# Patient Record
Sex: MALE | Race: WHITE | NOT HISPANIC OR LATINO | URBAN - METROPOLITAN AREA
[De-identification: names, ages, dates, MRNs, and addresses within clinical notes are randomized per-mention and may not be internally consistent; named-entity substitution may affect disease eponyms.]

---

## 2023-01-17 PROBLEM — F25.9 SCHIZOAFFECTIVE DISORDER, CHRONIC CONDITION (HCC): Status: ACTIVE | Noted: 2023-01-17

## 2023-01-17 PROBLEM — F12.11 CANNABIS ABUSE, IN REMISSION: Status: ACTIVE | Noted: 2023-01-17

## 2023-01-17 RX ORDER — RISPERIDONE 1 MG/1
1 TABLET ORAL 2 TIMES DAILY
COMMUNITY
End: 2023-01-19 | Stop reason: SDUPTHER

## 2023-01-17 RX ORDER — RISPERIDONE 2 MG/1
2 TABLET ORAL 2 TIMES DAILY
COMMUNITY
End: 2023-01-19 | Stop reason: SDUPTHER

## 2023-01-19 ENCOUNTER — TELEMEDICINE (OUTPATIENT)
Dept: PSYCHIATRY | Facility: CLINIC | Age: 28
End: 2023-01-19

## 2023-01-19 DIAGNOSIS — F25.9 SCHIZOAFFECTIVE DISORDER, CHRONIC CONDITION (HCC): Primary | ICD-10-CM

## 2023-01-19 DIAGNOSIS — F12.11 CANNABIS ABUSE, IN REMISSION: ICD-10-CM

## 2023-01-19 RX ORDER — RISPERIDONE 2 MG/1
TABLET ORAL
Qty: 90 TABLET | Refills: 1 | Status: SHIPPED | OUTPATIENT
Start: 2023-01-19

## 2023-01-19 RX ORDER — RISPERIDONE 1 MG/1
TABLET ORAL
Qty: 90 TABLET | Refills: 1 | Status: SHIPPED | OUTPATIENT
Start: 2023-01-19

## 2023-01-19 NOTE — PSYCH
Virtual Regular Visit    Verification of patient location: at home    Patient is located in the following state in which I hold an active license [place]      Assessment/Plan:       Diagnoses and all orders for this visit:    Schizoaffective disorder, chronic condition ([facility] [address] )  -     risperiDONE (RisperDAL) 1 mg tablet; Take 1 PO QD  -     risperiDONE (RisperDAL) 2 mg tablet; Take 1 PO Q HS    Cannabis abuse, in remission    Other orders  -     Discontinue: risperiDONE (RisperDAL) 1 mg tablet; Take 1 mg by mouth 2 (two) times a day Take 1 PO QD  -     Discontinue: risperiDONE (RisperDAL) 2 mg tablet; Take 2 mg by mouth 2 (two) times a day Take 1 PO Q HS          Goals addressed in session:   Good Health  Counseling provided:      Treatment Recommendations- Risks Benefits       Immediate Medical/Psychiatric/Psychotherapy Treatments and Any Precautions:     Risks, Benefits And Possible Side Effects Of Medications:  Risks, benefits, and possible side effects of medications explained to patient and patient verbalizes understanding    Controlled Medication Discussion: No records found for controlled prescriptions according to [address]      Reason for visit is No chief complaint on file  Medication Management    Encounter provider CHA Dahl    [place] [address] [place] [address]  [phone]      Recent Visits  No visits were found meeting these conditions  Showing recent visits within past 7 days and meeting all other requirements  Today's Visits  Date Type Provider Dept   01/19/23 Telemedicine [facility] today's visits and meeting all other requirements  Future Appointments  No visits were found meeting these conditions    Showing future appointments within next 150 days and meeting all other requirements       The patient was identified by name and date of birth  Cammie Parikh was informed that this is a telemedicine visit and that the visit is being conducted throughthe Yamise[employer] platform  He agrees to proceed     My office door was closed  No one else was in the room  He acknowledged consent and understanding of privacy and security of the video platform  The patient has agreed to participate and understands they can discontinue the visit at any time  Patient is aware this is a billable service  Subjective    Cammie Parikh is a 33 y o  male    here today for a med check  This was via [address] [facility]    normal appetite      HPI  Mood good  Denies anxiety  Denies Psychotic Symptoms  Denies Cannabis use  No problems with medication  Appetite Sleep good  Health OK  Denies SI/HI    No past medical history on file  No past surgical history on file  Current Outpatient Medications   Medication Sig Dispense Refill   • risperiDONE (RisperDAL) 1 mg tablet Take 1 PO QD 90 tablet 1   • risperiDONE (RisperDAL) 2 mg tablet Take 1 PO Q HS 90 tablet 1     No current facility-administered medications for this visit  Not on File    Social History     Substance and Sexual Activity   Drug Use Not on file       No family history on file          Objective    Mental status:  Appearance calm and cooperative  and adequate hygiene and grooming   Mood mood appropriate   Affect affect appropriate    Speech a normal rate and fluent   Thought Processes normal thought processes   Hallucinations no hallucinations present    Thought Content no delusions   Abnormal Thoughts no suicidal thoughts  and no homicidal thoughts    Orientation  oriented to person and place and time   Remote Memory short term memory intact and long term memory intact   Attention Span concentration intact   Intellect Appears to be of Average Intelligence   Insight Limited insight   Judgement judgment was limited   Muscle Strength Muscle strength and tone were normal and Normal gait    Language no difficulty naming common objects   Fund of Knowledge displays adequate knowledge of current events   Pain none   Pain Scale 0       Video Exam    There were no vitals filed for this visit      I spent 15 minutes directly with the patient during this visit    Patient Instructions   Continue Current Tx  This session went for an additional 5 minutes after he left to document, meds and Tx plan  Report Problems  Return 3 months       Visit Time    Visit Start Time: 0900  Visit Stop Time: 0914  Total Visit Duration: 14 min

## 2023-01-19 NOTE — PATIENT INSTRUCTIONS
Continue Current Tx  This session went for an additional 5 minutes after he left to document, meds and Tx plan  Report Problems  Return 3 months

## 2023-01-19 NOTE — BH TREATMENT PLAN
TREATMENT PLAN (Medication Management Only)        [facility]    Name and Date of Birth:  Nini Torres 33 y o  1995  Date of Treatment Plan: January 19, 2023  Diagnosis/Diagnoses:    1  Schizoaffective disorder, chronic condition ([address] )    2  Cannabis abuse, in remission      Strengths/Personal Resources for Self-Care: supportive family, supportive friends  Area/Areas of need (in own words): hallucinations, paranoid thoughts  1  Long Term Goal: maintain psychotic symptoms  Target Date:6 months - 7/19/2023  Person/Persons responsible for completion of goal: family Arash  2  Short Term Objective (s) - How will we reach this goal?:   A  Provider new recommended medication/dosage changes and/or continue medication(s): continue current medications as prescribed Risperdal   B  N/A   C  N/A  Target Date:6 months - 7/19/2023  Person/Persons Responsible for Completion of Goal: family Arash  Progress Towards Goals: continuing treatment  Treatment Modality: medication management every 3 months  Review due 180 days from date of this plan: 6 months - 7/19/2023  Expected length of service: ongoing treatment  My Physician/PA/NP and I have developed this plan together and I agree to work on the goals and objectives  I understand the treatment goals that were developed for my treatment

## 2023-02-14 ENCOUNTER — TELEPHONE (OUTPATIENT)
Dept: PSYCHIATRY | Facility: CLINIC | Age: 28
End: 2023-02-14

## 2023-02-14 NOTE — TELEPHONE ENCOUNTER
Patient's mother called to inform PF that this patient has a new insurance  Medicare [employer]  She will drop off a copy of the card

## 2023-02-22 ENCOUNTER — TELEMEDICINE (OUTPATIENT)
Dept: PSYCHIATRY | Facility: CLINIC | Age: 28
End: 2023-02-22

## 2023-02-22 DIAGNOSIS — F25.9 SCHIZOAFFECTIVE DISORDER, CHRONIC CONDITION (HCC): Primary | ICD-10-CM

## 2023-02-22 DIAGNOSIS — F12.11 CANNABIS ABUSE, IN REMISSION: ICD-10-CM

## 2023-02-22 RX ORDER — ARIPIPRAZOLE 5 MG/1
TABLET ORAL
Qty: 90 TABLET | Refills: 1 | Status: SHIPPED | OUTPATIENT
Start: 2023-02-22 | End: 2023-03-01

## 2023-02-22 NOTE — PSYCH
Visit Time      Verification of patient location: at home    Patient is located in the following state in which I hold an active license [place]      Assessment/Plan:       Diagnoses and all orders for this visit:    Schizoaffective disorder, chronic condition ([facility])  -     ARIPiprazole (ABILIFY) 5 mg tablet; Take 1 PO Q HS    Cannabis abuse, in remission          Goals addressed in session:   Good Health  Counseling provided:      Treatment Recommendations- Risks Benefits       Immediate Medical/Psychiatric/Psychotherapy Treatments and Any Precautions:     Risks, Benefits And Possible Side Effects Of Medications:  Risks, benefits, and possible side effects of medications explained to patient and patient verbalizes understanding    Controlled Medication Discussion: No records found for controlled prescriptions according to [address]      Reason for visit is No chief complaint on file  Medication Management     Encounter provider CHA Perez    Provider located at [address] [facility]  [address]  [phone]      Recent Visits  No visits were found meeting these conditions  Showing recent visits within past 7 days and meeting all other requirements  Today's Visits  Date Type Provider Dept   [facility] and meeting all other requirements  Future Appointments  No visits were found meeting these conditions  Showing future appointments within next 150 days and meeting all other requirements       The patient was identified by name and date of birth  Mark Ross was informed that this is a telemedicine visit and that the visit is being conducted throughthe [employer] mini[employer] platform  He agrees to proceed     My office door was closed  No one else was in the room    He acknowledged consent and understanding of privacy and security of the video platform  The patient has agreed to participate and understands they can discontinue the visit at any time  Patient is aware this is a billable service  Subjective    Akua Frances is a 33 y o  male    Here today for a med check  This was via [address] [facility]    normal appetite      HPI Mood good  Denies anxiety  Denies Psychotic Symptoms  Feels he needs a higher dose and or a change in meds  No problems with medication  Appetite Sleep good  Health OK  Denies SI/HI    No past medical history on file  No past surgical history on file  Current Outpatient Medications   Medication Sig Dispense Refill   • ARIPiprazole (ABILIFY) 5 mg tablet Take 1 PO Q HS 90 tablet 1     No current facility-administered medications for this visit  Not on File    Social History     Substance and Sexual Activity   Drug Use Not on file       No family history on file  Objective    Mental status:  Appearance calm and cooperative , adequate hygiene and grooming and good eye contact    Mood mood appropriate   Affect affect appropriate    Speech a normal rate and fluent   Thought Processes normal thought processes   Hallucinations no hallucinations present    Thought Content no delusions   Abnormal Thoughts no suicidal thoughts  and no homicidal thoughts    Orientation  oriented to person and place and time   Remote Memory short term memory intact and long term memory intact   Attention Span concentration intact   Intellect Appears to be of Average Intelligence   Insight Insight intact   Judgement judgment was intact   Muscle Strength Muscle strength and tone were normal and Normal gait    Language no difficulty naming common objects   Fund of Knowledge displays adequate knowledge of current events   Pain none   Pain Scale 0       Video Exam    There were no vitals filed for this visit      I spent 15 minutes directly with the patient during this visit    Patient Instructions   Continue Tx  D/C Risperdal- his request  Start Abilify  Report Problems  Return 3 months     Visit Time    Visit Start Time: 5863  Visit Stop Time: 2528  Total Visit Duration: 15 min

## 2023-02-27 ENCOUNTER — TELEPHONE (OUTPATIENT)
Dept: PSYCHIATRY | Facility: CLINIC | Age: 28
End: 2023-02-27

## 2023-03-01 DIAGNOSIS — F25.9 SCHIZOAFFECTIVE DISORDER, CHRONIC CONDITION (HCC): Primary | ICD-10-CM

## 2023-03-01 RX ORDER — OLANZAPINE 10 MG/1
TABLET ORAL
Qty: 30 TABLET | Refills: 3 | Status: SHIPPED | OUTPATIENT
Start: 2023-03-01 | End: 2023-04-12

## 2023-04-27 ENCOUNTER — TELEMEDICINE (OUTPATIENT)
Dept: PSYCHIATRY | Facility: CLINIC | Age: 28
End: 2023-04-27

## 2023-04-27 DIAGNOSIS — F25.9 SCHIZOAFFECTIVE DISORDER, CHRONIC CONDITION (HCC): Primary | ICD-10-CM

## 2023-04-27 DIAGNOSIS — F12.11 CANNABIS ABUSE, IN REMISSION: ICD-10-CM

## 2023-04-27 RX ORDER — ARIPIPRAZOLE 5 MG/1
TABLET ORAL
Qty: 30 TABLET | Refills: 3 | Status: SHIPPED | OUTPATIENT
Start: 2023-04-27

## 2023-05-03 ENCOUNTER — DOCUMENTATION (OUTPATIENT)
Dept: PSYCHIATRY | Facility: CLINIC | Age: 28
End: 2023-05-03

## 2023-05-03 NOTE — PSYCH
[facility]    Patient Name Julia Deluca     Date of Birth: 33 y o  1995      MRN: [id number]    Admission Date: several years ago    Date of Transfer: May 3, 2023    Admission Diagnosis:     Schizoaffective D/O Chronic Condition    Current Diagnosis:     No diagnosis found  Reason for Admission: Silvia Willingham presented for treatment due to paranoid ideation and auditory hallucinations  Primary complaints included PSYCHOTIC SYMPTOMS: auditory hallucinations, paranoid ideation  Progress in Treatment: Silvia Willingham was seen for Medication Management  During the course of treatment he      Episodes of Higher Level of Care: No    Transfer request Initiated by: Psychiatrist: Kristina Felix Therapist: None    Reason for Transfer Request: patient requested transfer    Does this individual need a clinician with specialized training/expertise?: No    Is this client working with any other [facility] Providers/Therapists?  Psychiatrist: None Therapist: None    Other pertinent issues: None    Are there any specific individuals who would be a best fit or who have already agreed to accept this transfer request?      Psychiatrist: None   Therapist: None  Rationale: Not Applicable    Attempts to maintain the current therapeutic relationship: Not Applicable    Transfer request routed to Clinical Coordinator for input and/or approval      Comments from other involved providers and/or clinical coordinator: None    Kristina Felix, NIKOLASNP05/03/23

## 2023-06-16 ENCOUNTER — DOCUMENTATION (OUTPATIENT)
Dept: PSYCHIATRY | Facility: CLINIC | Age: 28
End: 2023-06-16

## 2023-06-16 NOTE — PSYCH
[facility]    Patient Name Giovanni Albert     Date of Birth: 33 y o  1995      MRN: [id number]    Admission Date: several years ago    Date of Transfer: June 16, 2023    Admission Diagnosis:     Schizoaffectic D/O Chronic Condition    Current Diagnosis:     No diagnosis found  Reason for Admission: Ravinder Torres presented for treatment due to psychotic symptoms  Primary complaints included PSYCHOTIC SYMPTOMS: unremarkable  Progress in Treatment: Ravinder Torres was seen for Medication Management  During the course of treatment he said mood good  Denied Psychotic Symptoms  Wanted to do something other tan Risperdal   Risperdal D/C and Abilify started  Episodes of Higher Level of Care: No    Transfer request Initiated by: Psychiatrist: Nurse Practitioner Jewels Blanco Therapist: None    Reason for Transfer Request: clinician leaving practice    Does this individual need a clinician with specialized training/expertise?: No    Is this client working with any other [facility] Providers/Therapists?  Psychiatrist: None Therapist: None    Other pertinent issues: None    Are there any specific individuals who would be a “best fit” or who have already agreed to accept this transfer request?      Psychiatrist: None   Therapist: None  Rationale: Not Applicable    Attempts to maintain the current therapeutic relationship: Not Applicable    Transfer request routed to Clinical Coordinator for input and/or approval      Comments from other involved providers and/or clinical coordinator: None    CHA Newell06/16/23

## 2023-06-22 ENCOUNTER — TELEPHONE (OUTPATIENT)
Dept: PSYCHIATRY | Facility: CLINIC | Age: 28
End: 2023-06-22

## 2023-06-22 NOTE — TELEPHONE ENCOUNTER
Contacted client about cancelling 7/12/2023 appt  with CHA Aguirre due to him retiring  We are in the process of hiring new providers within our practice  Once we have their schedules, we will contact you to reschedule your appt  Please call [phone] to request refills as needed

## 2023-06-29 ENCOUNTER — TELEPHONE (OUTPATIENT)
Dept: PSYCHIATRY | Facility: CLINIC | Age: 28
End: 2023-06-29

## 2023-07-05 ENCOUNTER — TELEPHONE (OUTPATIENT)
Dept: PSYCHIATRY | Facility: CLINIC | Age: 28
End: 2023-07-05

## 2023-07-05 NOTE — TELEPHONE ENCOUNTER
Mom called, said that Stacy Baxter is "very manic and needs something to calm himselft down"    I spoke with mom, explained that his new dr can't prescribe new medications since he's not been seen yet but that she can call to see if his appt could be moved up, she then asked about speaking with a nurse which I told her she could do but that I thought she'd get the same answer about him needing to be seen first    Mom said that she's not sure she could get him to a sooner appt but that she'd think about it and maybe call to see if there was one or maybe call the nurse line for some more advice

## 2023-07-05 NOTE — TELEPHONE ENCOUNTER
Clts mother is going to forward some information  Regarding the patient for provider to review before she sees him. Sudhakar is not doing well on the medications he is currently taking.

## 2023-07-10 ENCOUNTER — TELEPHONE (OUTPATIENT)
Dept: PSYCHIATRY | Facility: CLINIC | Age: 28
End: 2023-07-10

## 2023-07-10 NOTE — TELEPHONE ENCOUNTER
FYI-for upcoming appointment on 7/12/23    Clients mother called to leave a message for new provider who will be seeing client on 7/12/2023. She wanted provider to know that client only took Abilify for one week after appointment with Chel Portillo in April and stopped taking because "it kept him up all night." Client is back to taking Risperdal 2 mg at night per the mother. Mother also reports "he talks to himself all day long, he has done this before but it's worse now. He has mood swings, and manic times, and periods of grogginess too."  Informed information would be passed along for upcoming appointment.

## 2023-07-12 ENCOUNTER — OFFICE VISIT (OUTPATIENT)
Dept: PSYCHIATRY | Facility: CLINIC | Age: 28
End: 2023-07-12
Payer: COMMERCIAL

## 2023-07-12 DIAGNOSIS — F20.9 SCHIZOPHRENIA, UNSPECIFIED TYPE (HCC): Primary | ICD-10-CM

## 2023-07-12 PROCEDURE — 90792 PSYCH DIAG EVAL W/MED SRVCS: CPT | Performed by: STUDENT IN AN ORGANIZED HEALTH CARE EDUCATION/TRAINING PROGRAM

## 2023-07-12 RX ORDER — RISPERIDONE 3 MG/1
3 TABLET ORAL
Qty: 30 TABLET | Refills: 1 | Status: SHIPPED | OUTPATIENT
Start: 2023-07-12

## 2023-07-12 NOTE — BH TREATMENT PLAN
TREATMENT PLAN - Medication Management        [address]    Name and Date of Birth:  Lul Melo 33 y.o. 1995  Date of Treatment Plan: July 12, 2023  Diagnosis/Diagnoses:    1. Schizophrenia, unspecified type [facility])        Strengths/Personal Resources for Self-Care: taking medications as prescribed, ability to understand psychiatric illness, good physical health     Area/Areas of need: psychotic symptoms   "Less stress, more walks, more playing games"    Long Term Goal: decrease psychotic symptoms  Target Date: 6 months - January 12, 2024  Person/Persons responsible for completion of goal: Elizabeth Cartwright and Akhil Pascual MD     Short Term Objective (s) - How will we reach this goal?:   1. Take medications as prescribed  2. Attend psychiatry appointments regularly  3. Avoid alcohol   4. Avoid drugs   5. Take walks regularly  Target Date: 6 months - January 12, 2024  Person/Persons Responsible for Completion of Goal: Elizabeth Cartwright     Progress Towards Goals: Continuing treatment    Treatment Modality: medication management every 1-3 months as needed and follow up with PCP  Review due 180 days from date of this plan: January 8, 2024   Expected length of service: Ongoing treatment    My physician and I have developed this plan together, and I agree to work on the goals and objectives. I understand the treatment goals that were developed for my treatment. The treatment plan was created between Akhil Pascual MD and Lul Melo on 07/12/23 at 3:58 PM but not signed at the time of the visit due to [facility] distancing. The plan was reviewed, and verbal consent was given.

## 2023-07-12 NOTE — PSYCH
[facility] Outpatient clinic   [address]   [phone]    Initial Psychiatric Evaluation    Name and Date of Birth:  Nuno Anderson 33 y.o. 1995 MRN: [id number]    Date of Visit: July 12, 2023    Reason for visit: Transfer of care, Full psychiatric assessment for medication management     Chief Complaint: Transfer of care, medication management, "The voices are back on the lower dose of medication"    HPI     Nuno Anderson is a 33 y.o.  Male, with high school education, single, domiciled with mother, with no past medical history of and past psychiatric history of schizoaffective disorder vs. schizophrenia who presents to the [facility] outpatient clinic as a transfer of care from [place], [place]. At his most recent visit with [facility] in April, he was denying psychotic symptoms and wished to switch from risperdal to something new, was switched to abilify 5mg at bedtime. Several days prior to today's visit, Tex's mother called the office to notify that he only took the abilify for one week after his last appointment because it "kept him up all night" and resumed taking risperdal 2mg HS at some point after that. He also reportedly has been talking to himself all day, with mood swings, grogginess and she feels he has been "manic."    In review of his past psychiatric history as discussed with Clemente Ventura today, he was first hospitalized at age 25 "After stealing my sisters klonopin." He states his psychotic symptoms started at age 26- 26 when he began hearing voices. He denies a prodromal period at that times. Denies any bloodwork or imaging workup when the voices started.  He feels this was exacerbated by smoking "a lot of marijuana" for 5 years, nearly every day, however he stopped smoking 2019 July after he "freaked out" and thought "there were people in his basement and living in his house and torturing him." He denies any other recreational drug use or alcohol use out of fear of worsening his psychosis symptoms. He feels he is sensitive to medications which "hurt my head" and will "only take medications that are just for schizophrenia and not bipolar disorder."    He has had a total of approximately 10 lifetime hospitalizations, the most recent being 2 years ago after a suicide attempt by hanging. The most common reason he is hospitalized is for psychosis. He has trialed few medications over the years but is unsure of why many of them were discontinued (see past psychiatric history below for further details). He feels he was stable on 3mg of risperdal previously. He tried invega SCHAEFER in 2019 but feels he is still having adverse effects from this experience. He feels his history of bullying has contributed to his psychotic symptoms, and describes as specific instance 4 years ago where a neighbor/friend told him he is a "loser" and that "nobody likes him" and he finds himself thinking about that. Brionna Davis presents today pacing and talking to himself throughout the evaluation. He states he is hearing voices "of people he knows" which he reports are not commanding in nature, which started approximately 2 months ago. He states he only hears one voice at a time and the content can be negative, positive, or funny at times. He finds caffeine and walking make the voices better, music makes it worse. He denies visual hallucinations, denies paranoia, denies delusions of reference or persecutory delusions. He reports limited motivation at times. He states he has been taking risperdal 2mg at bedtime. He tried abilify after last visit which caused him inability to sleep, so he self-restarted the risperdal 2mg which has not been adequately managing his symptoms. He had previously been on 3mg of risperdal total, however this was making him nauseous, causing it to be decreased to 2mg.  He states he does not take this with food. We discussed this nausea may be improved by taking the medication with food and he is in agreement with trying this. He states "when the medication is bumped up I don't talk to myself." He reports he lives with his mother and she feels he has been "manicky" which he describes means "stressed out" and denies any typical symptoms of mukesh - reports he is sleeping well, and denies impulsivity, irritability, grandiosity, talkativeness. He states he "feels sad sometimes" and at times "thinks life is not worth living" but "I would never do anything" to hurt himself. He feels most frustrated because his symptoms are interfering with his ability to play video games, which is his hobby in addition to taking walks. He denies any current stressors. Current Rating Scores:     Current PHQ-9   PHQ-2/9 Depression Screening    Little interest or pleasure in doing things: 1 - several days  Feeling down, depressed, or hopeless: 0 - not at all  Trouble falling or staying asleep, or sleeping too much: 0 - not at all  Feeling tired or having little energy: 1 - several days  Poor appetite or overeatin - more than half the days  Feeling bad about yourself - or that you are a failure or have let yourself or your family down: 0 - not at all  Trouble concentrating on things, such as reading the newspaper or watching television: 0 - not at all  Moving or speaking so slowly that other people could have noticed. Or the opposite - being so fidgety or restless that you have been moving around a lot more than usual: 0 - not at all  Thoughts that you would be better off dead, or of hurting yourself in some way: 0 - not at all  PHQ-9 Score: 4   PHQ-9 Interpretation: No or Minimal depression        Current JESSE-7 is   JESSE-7 Flowsheet Screening    Flowsheet Row Most Recent Value   Over the last 2 weeks, how often have you been bothered by any of the following problems?     Feeling nervous, anxious, or on edge 0   Not being able to stop or control worrying 0   Worrying too much about different things 0   Trouble relaxing 0   Being so restless that it is hard to sit still 2   Becoming easily annoyed or irritable 0   Feeling afraid as if something awful might happen 0   JESSE-7 Total Score 2      .     Psychiatric Review Of Systems:    Sleep changes: no 8PM, woke up for a bit around 5:30, slept until 10   Appetite changes: decreased  Weight changes: no  Energy/anergy: no  Interest/pleasure/anhedonia: no  Attention/concentration: no  Psychomotor agitation/retardation: yes  Somatic symptoms: no  Anxiety/panic: no  Mylene: no  Guilty/hopeless: no  Self injurious behavior/risky behavior: no  Suicidal ideation: no  Homicidal ideation: no  Auditory hallucinations: yes, auditory hallucinations of voices of people he knows, preoccupied, appears responding to internal stimuli  Visual hallucinations: denies  Other hallucinations: denies gustatory or tactile hallucinations  Delusional thinking: denies paranoia, ideas of reference, persecutory delusions    Review Of Systems:    Constitutional negative   ENT negative   Cardiovascular negative   Respiratory negative   Gastrointestinal negative   Genitourinary negative   Musculoskeletal negative   Integumentary negative   Neurological negative   Endocrine negative   Other Symptoms none, all other systems are negative       Past Psychiatric History:     Past psychiatric diagnoses:   • Schizophrenia, schizoaffective d/o, anxiety, depression   Inpatient psychiatric admissions:   • 10 times total (4 were involuntary)  • First - age 25 after stealing sisters klonopin  • Most common reason: first few were "no reason"  • 2 suicide attempts/SI: SI and wanting to jump off a roof (2021) a few months later after sleeping pills, tried to hang himself 2 years ago  • Psychosis 3 times (most recent for that reason was 3 years)  • Most recent was 2 years ago  • Has stayed out of the hospital due to "taking his medications, not having suicidal ideation"  • Denies history of ECT, denies having had ICM in past.   Prior outpatient psychiatric treatment:   • Saw Rizwana Arauz for 3 years  • Saw psychiatrist at [facility] for a year before that  Past/current psychotherapy:   • No  History of suicidal attempts/gestures:   • 2: took sleeping pills (August 2021), tried to hang self two years because gabapentin   History of non-suicidal self-injurious behavior:   • None  History of violence/aggressive behaviors:   • None  Psychotropic medication trials:   • Invega, (discomfort) risperdal, zyprexa, thorazine (all unknown reasons for discontinuation)  • Abilify (bad reaction)  • Invega SCHAEFER x1 in 2019 (hurt his head)  • Ativan (caused psychosis)  Substance abuse inpatient/outpatient rehabilitation:   • Rehab in 2017 - marijuana use  Eating disorder history:   • no    Substance Abuse History:      States he was smoking a lot of marijuana for 5 years, nearly every day, stopped smoking 2019 July after he "freaked out" and thought "there were people in his basement and living in his house and torturing him. "    Denies any other recreational drug use and otherwise denies history of alcohol, illict substance, or tobacco abuse., Patient denies previous legal actions or arrests related to substance intoxication including prior DWIs/DUIs., Patient does not exhibit objective evidence of substance withdrawal during today's examination nor do they appear under the influence of any psychoactive substance. Family Psychiatric History:     History reviewed. No pertinent family history.     Psychiatric Family History: Adopted, unknown if any history of psychiatric illness, substance use, or suicide attempts    Social History:    Developmental: Was in special education, denies IEP  Has 2 sister (48and 29years old), was adopted  Education: high school diploma/GED  Marital history: single  Children: none  Living arrangement, social support: Family is supportive, mom is supportive. Lives with mother, on a waiting list for housing, on disability for schizoaffective disorder. Occupational History: Permanent disability. 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 , Patient denies history of arrests or violence with a deadly weapon.  history: None    Traumatic History:     Abuse: Denies  Other Traumatic Events: Bullying, and would not disclose further    Past Medical History:    History reviewed. No pertinent past medical history. History reviewed. No pertinent surgical history. No Known Allergies    History Review: The following portions of the patient's history were reviewed and updated as appropriate: allergies, current medications, past family history, past medical history, past social history, past surgical history and problem list.    OBJECTIVE:    Vital signs in last 24 hours: There were no vitals filed for this visit.     Mental Status Evaluation:    Appearance age appropriate, casually dressed, appropriately groomed, intermittent eye contact, pacing the room   Behavior agitated, pacing, anxious, laughing to self   Speech coherent, increased latency of response, also muttering to himself unable to decipher what is being said   Mood euthymic   Affect blunted when speaking but reactive to internal stimuli   Thought Processes goal directed, decreased rate of thoughts   Associations concrete associations   Thought Content denies delusions or paranoia   Perceptual Disturbances: Reports auditory hallucinations as detailed above, denies command in nature, denies VH, and Appears to be responding to internal stimuli   Abnormal Thoughts  Risk Potential Denies suicidal or homicidal ideation, plan, or intent   Orientation oriented to person, place, time/date and situation Memory recent and remote memory grossly intact   Consciousness alert and awake   Attention Span Concentration Span decreased attention span  decreased concentration   Intellect appears to be of average intelligence   Insight intact   Judgement intact   Muscle Strength and  Gait normal muscle strength and normal muscle tone, normal gait and normal balance   Motor Activity no abnormal movements   Language no difficulty naming common objects, no difficulty repeating a phrase, no difficulty writing a sentence   Fund of Knowledge adequate knowledge of current events  adequate fund of knowledge regarding past history  adequate fund of knowledge regarding vocabulary        Laboratory Results: I have personally reviewed all pertinent laboratory/tests results    Recent Labs (last 6 months):   No visits with results within 6 Month(s) from this visit. Latest known visit with results is:   No results found for any previous visit. Suicide/Homicide Risk Assessment:    Risk of Harm to Self:  • The following ratings are based on assessment at the time of the interview  • Demographic risk factors include: , never , male  • Historical Risk Factors include: chronic psychotic symptoms, history of psychosis, history of suicide attempts, history of substance use  • Recent Specific Risk Factors include: mental illness diagnosis, presence of hallucinations, unemployed  • Protective Factors: no current suicidal ideation, access to mental health treatment, compliant with medications, compliant with mental health treatment, having a desire to be alive, restricted access to lethal means, stable living environment, strong relationships, supportive family  • Weapons: none.  The following steps have been taken to ensure weapons are properly secured: not applicable  • Based on today's assessment, Rameshcelio Armando presents the following risk of harm to self: low    Risk of Harm to Others:  • The following ratings are based on assessment at the time of the interview  • Demographic Risk Factors include: male, unemployed, under age 37. • Historical Risk Factors include: none. • Recent Specific Risk Factors include: concomitant thought disorder. • Protective Factors: no current homicidal ideation, able to manage anger well, access to mental health treatment, compliant with medications, compliant with mental health treatment, restricted access to lethal means, safe and stable living environment, support system, supportive family  • Weapons: none. The following steps have been taken to ensure weapons are properly secured: not applicable  • Based on today's assessment, Ariel Paige presents the following risk of harm to others: low    The following interventions are recommended: no intervention changes needed. Although patient's acute lethality risk is LOW, long-term/chronic lethality risk is mildly elevated given detailed above., However, at the current moment, patient is future-oriented, forward-thinking, and demonstrates ability to act in a self-preserving manner as evidenced by volitionally seeking psychiatric evaluation and treatment today. Gilmar Morse contracts for safety and is not an imminent risk of harm to self or others. Outpatient level of care is deemed appropriate at this current time. Patient understands that if they can no longer contract for safety, they need to call the office or report to their nearest Emergency Room for immediate evaluation. At this juncture, inpatient hospitalization is not currently warranted. To mitigate future risk, patient should adhere to treatment recommendations, avoid alcohol/illicit substance use, utilize community-based resources and familiar support, and prioritize mental health treatment.        Assessment/Plan:   Gilmar Morse is a 33 y.o. male, with high school education, single, adopted, domiciled with mother, with multiple admissions and 2 suicide attempts in the past, and with past psychiatric history of schizoaffective disorder vs. schizophrenia who presents to the [facility] outpatient clinic as a transfer of care from [address]. Beatris Emery reports a longstanding history of psychosis without clear mood symptoms, though at times feels sad and at times in the past suicidal, feels he has never truly been in a depressive episode, and denies any past symptoms consistent with mukehs or hypomania. At this time he  appears to meet criteria for schizophrenia. He is currently experiencing symptoms of psychosis without concurrent mood symptoms. Discussed if he feels he would require/want inpatient treatment at this time and he declines. Patient appears to have good grooming and hygiene, reports he has been sleeping, eating, and caring for himself appropriately and attending to ADLS, demonstrating he is not at imminent risk of death due to self-neglect within 30 days. He appears to have fair insight into his mental illness and good judgement regarding need for medication and treatment. He reports he historically has asked for help when needed. He denies any thoughts to harm himself or others, and reports no history of harming others even during a psychotic episode. He does not appear to pose elevated risk of harming self or others. He does not wish to try a new medication and wishes to have his risperdal increased to better manage his psychotic symptoms. He is agreeable to follow up in two weeks to ensure adherence, check in for progress and symptoms,and check in regarding side effects. He is aware of [facility] crisis information and crisis hotline, and agrees to call the office with any issues or concerns.     DSM-5 Diagnoses:     1. Schizophrenia, unspecified type ([address])        Treatment Recommendations/Precautions:  • Increase risperdal to 3mg HS for psychotic symptoms  o Can further titrate if needed for lack of response at next visit in 2 weeks  o PARQ completed including sedation, agitation, akathisia, TD, dystonic reactions, NMS, EPS, GI distress, dizziness, risk of metabolic syndrome, orthostatic hypotension and cardiovascular risks such as QT prolongation, increased prolactin  • Declines other PRN medications in the meantime  • Medication management follow-up in 2 weeks  • Aware if any issues to call office with questions or concerns. Aware of crisis line. Risks, benefits, and possible side effects of medications explained to [facility] and he verbalizes understanding and agreement for treatment. Controlled Medication Discussion:     Not applicable    Treatment Plan:    Completed and signed during the session: Yes - Treatment Plan done but not signed at time of office visit due to:  Plan reviewed in person and verbal consent given due to [facility] social distancing      Note Share Disclaimer:      This note was not shared with the patient due to reasonable likelihood of causing patient harm      Neftali Locke MD 07/12/23

## 2023-07-12 NOTE — PATIENT INSTRUCTIONS
Please call the office nursing staff for medication issues including refills, problems getting medications, bothersome side effects, etc at . Please return for a follow up appointment as discussed and arrive approximately 15 minutes prior to your appointment time. If you are running late or are unable to attend your appointment, please call our office at [phone]. If you have thoughts of harming yourself or are otherwise in psychological crisis, do not hesitate to contact your [address] [facility], or 911 or go to the nearest emergency room.   [facility] Crisis: [facility] Crisis: [address] Crisis: [phone]  [facility] Crisis: [phone]  [facility] Crisis: [address] Crisis: [address] [facility] Crisis: [phone]  National Suicide Prevention Hotline: 3-561.233.8643 or call 65

## 2023-07-25 NOTE — PSYCH
MEDICATION MANAGEMENT NOTE      [address]:  [address]   [place]  [address] [place] [address]   [phone]    Name and Date of Birth:  Wilian Dunham 33 y.o. 1995 MRN: [id number]    Date of Visit: July 25, 2023    Reason for Visit: Follow-up visit regarding medication management     _____________________________    Assessment/Plan   Wilian Dunham is a 33 y.o. male, Single with prior psychiatric diagnoses of schizoaffective disorder; with suicide risk factors including personal history of suicide attempt as recently as 2021, chronic mental illness, psychosis, gender (male) and never . Stacy Baxter was personally seen and evaluated today at the [address] [place] [address] outpatient clinic for follow-up regarding medication management. Today on evaluation, Stacy Baxter reports that he has not been taking his medication as prescribed and did not increase his dosage as planned at his last appointment 2 weeks ago. As a result, he continues to feel irritable, anxious, pacing, hearing to voices and responding to internal stimuli. We discussed alternative medication options including clozaril, adding zyprexa, or adding loxapine with goal to transition to loxapine. Patient considering clozaril down the line but would like to try loxapine. Explains risks and side effects, and medication dosage instructions and he expressed understanding and agreement. Will plan to see him in three weeks to assess progress. Will also obtain blood work as patient has not had bloodwork done recently and no labs are available in epic.     DSM-5 Diagnoses:     1. Schizoaffective disorder, bipolar type - not at goal    Treatment Recommendations/Precautions:  • Continue psychopharmacological treatment as follows:  o Continue risperdal 2mg HS as patient has been taking this dosage, for psychosis  o Initiate loxapine 10mg BID for psychosis, with plans to titrate as needed   o Joon Wibaux was completed for second generation antipsychotic medicationd including sedation, GI distress, dizziness, risk of metabolic syndrome, EPS (akathisia, TD, etc), rare NMS, orthostatic hypotension, cardiovascular risks such as QT prolongation, increased prolactin, and others. • Obtain CBC, CMP, TSH, Lipid panel, A1c, vitamin D  • Follow up with PCP for medical issues and ongoing care  • Follow up in 3 weeks for medication management  • Aware of 24 hour and weekend coverage for urgent situations accessed by calling [facility] main practice number    Psychotherapy Provided: Individual psychotherapy provided: No     Treatment Plan:     Completed and signed during the session: Not applicable - Treatment Plan not due at this session    Medications Risks/Benefits:      Risks, Benefits And Possible Side Effects Of Medications:    Risks, benefits, and possible side effects of medications explained to [facility] and he verbalizes understanding and agreement for treatment. Controlled Medication Discussion:     Not applicable  _____________________________________________    History of Present Illness     Chief Complaint: "The psychosis is bad"    SUBJECTIVE:    Dariel Bernard is a 33 y.o., , male, possessing a past psychiatric history significant for schizoaffective disorder, who was personally seen and evaluated at the [address] [place] [address] outpatient clinic  for follow-up regarding medication management. [facility] states that since their previous outpatient psychiatric appointment with this writer, he has not been taking the increased dose of the risperdal. He tried to take the higher dose and it makes him feel more stressed, and feel more irritable. He feels that his "psychosis" and "stress" have been difficult, is having flashbacks of his neighbor talking to him while we are in the room together.      He states he is willing to switch to a different medication but would prefer to have a second agent added onto his current medication. He has been going to the pool and going on walks, mood has been "good." His baseline when medications are working: less stress, less pacing, no talking to self, and could play games     Presently, patient denies suicidal/homicidal ideation in addition to thoughts of self-injury; contracts for safety. At conclusion of evaluation, patient is amenable to the recommendations of this writer including: continue psychotropic medications as prescribed. Also, patient is amenable to calling/contacting the outpatient office including this writer if any acute adverse effects of their medication regimen arise in addition to any comments or concerns pertaining to their psychiatric management. Patient is amenable to calling/contacting crisis and/or attending to the nearest emergency department if their clinical condition deteriorates to assure their safety and stability, stating that they are able to appropriately confide in their mother regarding their psychiatric state. Current Rating Scores:     Unable to complete due to patient factors    Psychiatric Review Of Systems:    Unchanged information from this writer's previous assessment is copied and italicized; information that has changed is bolded.     Appetite: no  Adverse eating: no  Weight changes: no  Insomnia/sleeplessness: no  Fatigue/anergy: no  Anhedonia/lack of interest: no  Attention/concentration: no  Psychomotor agitation/retardation: agitation  Somatic symptoms: no  Anxiety/panic attack: worrying daily, excessive worrying   Mylene/hypomania: no  Hopelessness/helplessness/worthlessness: no  Self-injurious behavior/high-risk behavior: no  Suicidal ideation: no  Homicidal ideation: no  Auditory hallucinations: yes, auditory hallucinations  Visual hallucinations: no  Other perceptual disturbances: no  Delusional thinking: no  Obsessive/compulsive symptoms: no    Review Of Systems: Constitutional negative   ENT negative   Cardiovascular negative   Respiratory negative   Gastrointestinal negative   Genitourinary negative   Musculoskeletal negative   Integumentary negative   Neurological negative   Endocrine negative   Other Symptoms none, all other systems are negative     Objective    OBJECTIVE:     Visit Vitals  Smoking Status Never      Wt Readings from Last 6 Encounters:   No data found for Wt        No past medical history on file. No past surgical history on file. Meds/Allergies    No Known Allergies  Current Outpatient Medications   Medication Instructions   • risperiDONE (RISPERDAL) 3 mg, Oral, Daily at bedtime, Take 1 tablet (3mg total) by mouth daily at bedtime with food.          Mental Status Exam:    Appearance age appropriate, casually dressed, wearing sunglasses   Behavior cooperative, psychomotor agitation, restless and fidgety, pacing   Speech normal rate, normal volume, normal pitch, increased latency of response   Mood euthymic   Affect inappropriate laugh   Thought Processes mostly goal directed   Associations thought blocking   Thought Content no overt delusions   Perceptual Disturbances: Reports "reliving past conversationg", Appears to be responding to internal stimuli and Appears to be internally preoccupied   Abnormal Thoughts  Risk Potential Denies suicidal or homicidal ideation, plan, or intent   Orientation oriented to person, place, time/date and situation   Memory recent and remote memory grossly intact   Consciousness alert and awake   Attention Span Concentration Span attention span and concentration are age appropriate   Intellect appears to be of average intelligence   Insight intact   Judgement intact   Muscle Strength and  Gait normal muscle strength and normal muscle tone, normal gait and normal balance   Motor Activity no abnormal movements   Language no difficulty naming common objects, no difficulty repeating a phrase, no difficulty writing a sentence   Fund of Knowledge adequate knowledge of current events  adequate fund of knowledge regarding past history  adequate fund of knowledge regarding vocabulary      Laboratory Results: I have personally reviewed all pertinent laboratory/tests results    No visits with results within 6 Month(s) from this visit. Latest known visit with results is:   No results found for any previous visit. Medical Decision Making / Counseling / Coordination of Care: Although patient's acute lethality risk is LOW, long-term/chronic lethality risk is mildly elevated given the risk factors listed above. However, at the current moment, Elizabeth Cartwright is future-oriented, forward-thinking, and demonstrates ability to act in a self-preserving manner as evidenced by volitionally seeking psychiatric evaluation and treatment today. To mitigate future risk, patient should adhere to treatment recommendations, avoid alcohol/illicit substance use, utilize community-based resources and familiar support, and prioritize mental health treatment. The diagnosis and treatment plan were reviewed with the patient. Risks, benefits, and alternatives to treatment were discussed. The importance of medication and treatment compliance was reviewed with the patient.     ___________________________________    History Review: The following portions of the patient's history were reviewed and updated as appropriate: allergies, current medications, past family history, past medical history, past social history, past surgical history and problem list.    Unchanged information from this writer's previous assessment is copied and italicized; information that has changed is bolded.     Past Psychiatric History:      Past psychiatric diagnoses:   • Schizophrenia, schizoaffective d/o, anxiety, depression   Inpatient psychiatric admissions:   • 10 times total (4 were involuntary)  • First - age 25 after stealing sisters klonopin  • Most common reason: first few were "no reason"  • 2 suicide attempts/SI: SI and wanting to jump off a roof (2021) a few months later after sleeping pills, tried to hang himself 2 years ago  • Psychosis 3 times (most recent for that reason was 3 years)  • Most recent was 2 years ago  • Has stayed out of the hospital due to "taking his medications, not having suicidal ideation"  • Denies history of ECT, denies having had ICM in past.   Prior outpatient psychiatric treatment:   • Saw Yulissa Lynn for 3 years  • Saw psychiatrist at [facility] for a year before that  Past/current psychotherapy:   • No  History of suicidal attempts/gestures:   • 2: took sleeping pills (August 2021), tried to hang self two years because gabapentin   History of non-suicidal self-injurious behavior:   • None  History of violence/aggressive behaviors:   • None  Psychotropic medication trials:   • Invega, (discomfort) risperdal, zyprexa, thorazine (all unknown reasons for discontinuation)  • Abilify (bad reaction)  • Invega SCHAEFER x1 in 2019 (hurt his head)  • Ativan (caused psychosis)  Substance abuse inpatient/outpatient rehabilitation:   • Rehab in 2017 - marijuana use  Eating disorder history:   • no     Substance Abuse History:        States he was smoking a lot of marijuana for 5 years, nearly every day, stopped smoking 2019 July after he "freaked out" and thought "there were people in his basement and living in his house and torturing him. "     Denies any other recreational drug use and otherwise denies history of alcohol, illict substance, or tobacco abuse., Patient denies previous legal actions or arrests related to substance intoxication including prior DWIs/DUIs., Patient does not exhibit objective evidence of substance withdrawal during today's examination nor do they appear under the influence of any psychoactive substance.       Family Psychiatric History:      Family History   History reviewed.  No pertinent family history.        Psychiatric Family History: Adopted, unknown if any history of psychiatric illness, substance use, or suicide attempts     Social History:     Developmental: Was in special education, denies IEP  Has 2 sister (48and 29years old), was adopted  Education: high school diploma/GED  Marital history: single  Children: none  Living arrangement, social support: Family is supportive, mom is supportive. Lives with mother, on a waiting list for housing, on disability for schizoaffective disorder. Occupational History: Permanent disability. 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 , Patient denies history of arrests or violence with a deadly weapon.     history: None     Traumatic History:      Abuse: Denies  Other Traumatic Events: Bullying, and would not disclose further      ___________________________________      This note was not shared with the patient due to reasonable likelihood of causing patient harm    Neftali Locke MD   07/25/23

## 2023-07-26 ENCOUNTER — OFFICE VISIT (OUTPATIENT)
Dept: PSYCHIATRY | Facility: CLINIC | Age: 28
End: 2023-07-26
Payer: COMMERCIAL

## 2023-07-26 DIAGNOSIS — F25.0 SCHIZOAFFECTIVE DISORDER, BIPOLAR TYPE (HCC): Primary | ICD-10-CM

## 2023-07-26 PROCEDURE — 99214 OFFICE O/P EST MOD 30 MIN: CPT | Performed by: STUDENT IN AN ORGANIZED HEALTH CARE EDUCATION/TRAINING PROGRAM

## 2023-07-26 RX ORDER — LOXAPINE SUCCINATE 10 MG/1
10 TABLET ORAL 2 TIMES DAILY
Qty: 60 CAPSULE | Refills: 0 | Status: SHIPPED | OUTPATIENT
Start: 2023-07-26 | End: 2023-07-31

## 2023-07-26 RX ORDER — RISPERIDONE 2 MG/1
2 TABLET ORAL
Qty: 30 TABLET | Refills: 0 | Status: SHIPPED | OUTPATIENT
Start: 2023-07-26

## 2023-07-26 NOTE — PATIENT INSTRUCTIONS
Start loxapine 10mg twice daily  Continue taking risperdal 2mg at bedtime    Obtain blood work when you are able to.

## 2023-07-31 ENCOUNTER — TELEPHONE (OUTPATIENT)
Dept: PSYCHIATRY | Facility: CLINIC | Age: 28
End: 2023-07-31

## 2023-07-31 DIAGNOSIS — F25.0 SCHIZOAFFECTIVE DISORDER, BIPOLAR TYPE (HCC): ICD-10-CM

## 2023-07-31 LAB
BASOPHILS # BLD AUTO: 0.1 X10E3/UL (ref 0–0.2)
BASOPHILS NFR BLD AUTO: 1 %
EOSINOPHIL # BLD AUTO: 0.2 X10E3/UL (ref 0–0.4)
EOSINOPHIL NFR BLD AUTO: 4 %
ERYTHROCYTE [DISTWIDTH] IN BLOOD BY AUTOMATED COUNT: 12.2 % (ref 11.6–15.4)
HCT VFR BLD AUTO: 42.7 % (ref 37.5–51)
HGB BLD-MCNC: 14.4 G/DL (ref 13–17.7)
IMM GRANULOCYTES # BLD: 0 X10E3/UL (ref 0–0.1)
IMM GRANULOCYTES NFR BLD: 0 %
LYMPHOCYTES # BLD AUTO: 1.5 X10E3/UL (ref 0.7–3.1)
LYMPHOCYTES NFR BLD AUTO: 31 %
MCH RBC QN AUTO: 29.6 PG (ref 26.6–33)
MCHC RBC AUTO-ENTMCNC: 33.7 G/DL (ref 31.5–35.7)
MCV RBC AUTO: 88 FL (ref 79–97)
MONOCYTES # BLD AUTO: 0.4 X10E3/UL (ref 0.1–0.9)
MONOCYTES NFR BLD AUTO: 7 %
NEUTROPHILS # BLD AUTO: 2.8 X10E3/UL (ref 1.4–7)
NEUTROPHILS NFR BLD AUTO: 57 %
PLATELET # BLD AUTO: 284 X10E3/UL (ref 150–450)
RBC # BLD AUTO: 4.87 X10E6/UL (ref 4.14–5.8)
WBC # BLD AUTO: 4.9 X10E3/UL (ref 3.4–10.8)

## 2023-07-31 RX ORDER — LOXAPINE SUCCINATE 5 MG/1
5 TABLET ORAL 2 TIMES DAILY
Qty: 60 CAPSULE | Refills: 0 | Status: SHIPPED | OUTPATIENT
Start: 2023-07-31

## 2023-07-31 NOTE — TELEPHONE ENCOUNTER
Mom called back, said that Rosa Zepeda does not want to try taking the 10mg bid, that he's worried about the potential side effects     Mom is asking if you will now send in the decreased amount so he can take 5mg bid

## 2023-07-31 NOTE — TELEPHONE ENCOUNTER
Mom, REBECCA on file, calling regarding new script for loxapine 10mg bid; worried this is a high dose of medication and would like to confirm the reasoning.

## 2023-07-31 NOTE — TELEPHONE ENCOUNTER
Earlier had called and discussed with mom about loxapine 10mg BID, and possibility of lowering to 5mg BID if concerns for side effects. Initially she spoke to [facility] while this writer was on the phone, and they decided to try the 10mg BID however she called back shortly thereafter requesting the 5mg BID prescription be sent. Will therefore prescribe loxapine 5mg BID, to be taken with patient's current prescription of Risperdal 2mg HS. If patient or parent has any questions or concerns, they are aware they can call back at any time.      Xavier Wong MD  7/31/23

## 2023-08-03 DIAGNOSIS — F25.0 SCHIZOAFFECTIVE DISORDER, BIPOLAR TYPE (HCC): Primary | ICD-10-CM

## 2023-08-03 DIAGNOSIS — Z79.899 LONG TERM CURRENT USE OF ANTIPSYCHOTIC MEDICATION: ICD-10-CM

## 2023-08-11 LAB
25(OH)D3+25(OH)D2 SERPL-MCNC: 25.9 NG/ML (ref 30–100)
ALBUMIN SERPL-MCNC: 5 G/DL (ref 4.3–5.2)
ALBUMIN/GLOB SERPL: 2.4 {RATIO} (ref 1.2–2.2)
ALP SERPL-CCNC: 76 IU/L (ref 44–121)
ALT SERPL-CCNC: 25 IU/L (ref 0–44)
AST SERPL-CCNC: 26 IU/L (ref 0–40)
BILIRUB SERPL-MCNC: 0.6 MG/DL (ref 0–1.2)
BUN SERPL-MCNC: 11 MG/DL (ref 6–20)
BUN/CREAT SERPL: 11 (ref 9–20)
CALCIUM SERPL-MCNC: 9.4 MG/DL (ref 8.7–10.2)
CHLORIDE SERPL-SCNC: 100 MMOL/L (ref 96–106)
CHOLEST SERPL-MCNC: 156 MG/DL (ref 100–199)
CO2 SERPL-SCNC: 24 MMOL/L (ref 20–29)
CREAT SERPL-MCNC: 0.97 MG/DL (ref 0.76–1.27)
EGFR: 110 ML/MIN/1.73
EST. AVERAGE GLUCOSE BLD GHB EST-MCNC: 103 MG/DL
GLOBULIN SER-MCNC: 2.1 G/DL (ref 1.5–4.5)
GLUCOSE SERPL-MCNC: 93 MG/DL (ref 70–99)
HBA1C MFR BLD: 5.2 % (ref 4.8–5.6)
HDLC SERPL-MCNC: 44 MG/DL
LDLC SERPL CALC-MCNC: 96 MG/DL (ref 0–99)
LDLC/HDLC SERPL: 2.2 RATIO (ref 0–3.6)
POTASSIUM SERPL-SCNC: 4.2 MMOL/L (ref 3.5–5.2)
PROLACTIN SERPL-MCNC: 46.2 NG/ML (ref 4–15.2)
PROT SERPL-MCNC: 7.1 G/DL (ref 6–8.5)
SL AMB VLDL CHOLESTEROL CALC: 16 MG/DL (ref 5–40)
SODIUM SERPL-SCNC: 139 MMOL/L (ref 134–144)
TRIGL SERPL-MCNC: 85 MG/DL (ref 0–149)
TSH SERPL DL<=0.005 MIU/L-ACNC: 2.73 UIU/ML (ref 0.45–4.5)

## 2023-08-16 ENCOUNTER — OFFICE VISIT (OUTPATIENT)
Dept: PSYCHIATRY | Facility: CLINIC | Age: 28
End: 2023-08-16
Payer: COMMERCIAL

## 2023-08-16 ENCOUNTER — TELEPHONE (OUTPATIENT)
Dept: PSYCHIATRY | Facility: CLINIC | Age: 28
End: 2023-08-16

## 2023-08-16 DIAGNOSIS — E55.9 VITAMIN D DEFICIENCY: ICD-10-CM

## 2023-08-16 DIAGNOSIS — F20.9 SCHIZOPHRENIA, UNSPECIFIED TYPE (HCC): Primary | ICD-10-CM

## 2023-08-16 PROCEDURE — 99214 OFFICE O/P EST MOD 30 MIN: CPT | Performed by: STUDENT IN AN ORGANIZED HEALTH CARE EDUCATION/TRAINING PROGRAM

## 2023-08-16 RX ORDER — ERGOCALCIFEROL 1.25 MG/1
50000 CAPSULE ORAL WEEKLY
Qty: 8 CAPSULE | Refills: 0 | Status: SHIPPED | OUTPATIENT
Start: 2023-08-16 | End: 2023-10-05

## 2023-08-16 RX ORDER — LOXAPINE SUCCINATE 5 MG/1
TABLET ORAL
Qty: 150 CAPSULE | Refills: 0 | Status: SHIPPED | OUTPATIENT
Start: 2023-08-16

## 2023-08-16 RX ORDER — RISPERIDONE 2 MG/1
2 TABLET ORAL
Qty: 30 TABLET | Refills: 0 | Status: SHIPPED | OUTPATIENT
Start: 2023-08-16

## 2023-08-16 NOTE — TELEPHONE ENCOUNTER
Mom called, asked to talk to you about the following from today's appt    Said that she'd like to confirm that he's supposed to increase the loxapine, to 1 in the morning and 20 hs?  Asked if he's still supposed to take the risperidone hs if he's increased the loxapine    Also asked to discuss lab results with you, said that the "a-g ratio" wasn't discussed with him but according to the results, it's not in normal range

## 2023-08-16 NOTE — LETTER
Patient: Fernando Mckeon    YOB: 1995      Reference #: [id number]      To Whom It May Concern,      Fernando Mckeon is currently under my professional care at [address] [place], [place] [address] Outpatient clinic for the management of schizophrenia. Due to the functional limitations imposed by this diagnosis and his current symptoms, he is unable to be employed at this time. If you have any questions or concerns, please do not hesitate to call the office at [phone].         Sincerely,        Andrew Garcia MD  08/16/23

## 2023-08-16 NOTE — PSYCH
MEDICATION MANAGEMENT NOTE      [address]: [address]   [place]  [address] [place] [address]   [phone]    Name and Date of Birth:  Scar Russ 33 y.o. 1995 MRN: [id number]    Date of Visit: August 16, 2023    Reason for Visit: Follow-up visit regarding medication management     _____________________________    Assessment/Plan   Scar Russ is a 33 y.o. male, Single with prior psychiatric diagnoses of schizoaffective disorder; with suicide risk factors including personal history of suicide attempt as recently as 2021, chronic mental illness, psychosis, gender (male) and never . Rajelena Hannah was personally seen and evaluated today at the [address] [place] [address] outpatient clinic for follow-up regarding medication management. On assessment, Scar Russ he has shown some slight improvements with the addition of the loxapine to his regimen. He reports that he is talking to himself significantly less, and he is having less experiences of reliving his past experiences. During evaluation, he was not noted to be talking to himself until the very end of the evaluation when he began to laugh to himself and talk to himself. He is having less thought blocking and appears slightly less restless than previous examinations. He does report that it is causing daytime sedation, which he finds difficult to tolerate. Discussed readjusting the timing of the medication to be more in the evening, increasing dosage to better control symptoms of psychosis and he is in agreement with this. DSM-5 Diagnoses/Visit diagnoses:     1. Schizophrenia, unspecified type ([facility])    2.  Vitamin D deficiency        Treatment Recommendations/Precautions:  • Continue psychopharmacological management as follows:  o Increase loxapine to 5mg in the morning and 20mg at bedtime for better control of psychotic symptoms, changing timing due to excessive daytime sedation  - PARQ discussed with patient included sedation, dizziness, weight gain, dry mouth, constipation, hypotension, dyslipidemia, EPS, metabolic syndrome, peripheral edema, dyspepsia, joint and body pains, tachycardia, orthostatic hypotension, rash with sunlight exposure, hyperglycemia, seizures, skin rash, rare NMS, and increased risk of CVA in patient's with dementia.  o Continue risperdal 2mg HS for psychosis  o Initiate vitamin D 50,000 units weekly for 8 weeks   - Capsule preferred by patient  - Obtain new Vitamin D level in approximately 2 months  • Labs reviewed and discussed with patient   • Follow up in 2-4 weeks for medication management  • Follow up with PCP for medical issues and ongoing care  • Aware of 24 hour and weekend coverage for urgent situations accessed by calling [facility] main practice number    Individual psychotherapy provided: No     Treatment Plan:     Completed and signed during the session: Not applicable - Treatment Plan not due at this session    Medications Risks/Benefits:      Risks, Benefits And Possible Side Effects Of Medications:    Risks, benefits, and possible side effects of medications explained to [facility] and he verbalizes understanding and agreement for treatment. Controlled Medication Discussion:     Not applicable    Medical Decision Making / Counseling / Coordination of Care: The following interventions are recommended: return in 2 weeks for follow up. Although patient's acute lethality risk is LOW, long-term/chronic lethality risk is mildly elevated given the risk factors listed above. However, at the current moment, [facility] is future-oriented, forward-thinking, and demonstrates ability to act in a self-preserving manner as evidenced by volitionally seeking psychiatric evaluation and treatment today.  To mitigate future risk, patient should adhere to treatment recommendations, avoid alcohol/illicit substance use, utilize community-based resources and familiar support, and prioritize mental health treatment. The diagnosis and treatment plan were reviewed with the patient. Risks, benefits, and alternatives to treatment were discussed. The importance of medication and treatment compliance was reviewed with the patient.     _____________________________________________    History of Present Illness     Chief Complaint: "I'm thinking more clearly and talking to myself less"    SUBJECTIVE:    Lul Melo is a 33 y.o., , male, possessing a past psychiatric history significant for schizoaffective disorder, who was personally seen and evaluated at the [address] [place] [address] outpatient clinic  for follow-up regarding medication management. At their last visit, the plan was to continue risperdal 2mg HS and initiate loxapine 10mg BID for psychosis, which was later decreased to 5mg BID due to mother's concerns about side effects. Blood work was ordered and collected for monitoring. Elizabeth Cartwright states that since their previous psychiatric appointment with this writer, he feels he is thinking more clearly, is talking to himself less. He has been taking 10mg twice daily for a few weeks did not take the 5mg BID. Meds "knock him out", but he has been having "less bad thoughts." He has been talking to himself significantly less. He was not talking to himself throughout evaluation, as compared to previous few visits, however by the end of the visit he began to talk and laugh to himself again. Asked Elizabeth Cartwright about his erectile dysfunction symptoms and numbness his mother had reported on the phone during our conversation. He reports some anxiety at times. He began speaking about delusional thoughts regarding a rapper.  He stated that in 2017 after he experienced significant stress and bullying, he talked to rapper ("I won't say who he is) on the phone who was pretending to be cousin" to be nice." He states that people "used to pretend to be related to me for attention." The rapper came to his house, he didn't want to meet him, but because of this "family and friends wanted to kill me, saying, "Your cousin is famous we want to kill you.""  He thought this was psychosis but over several years he now feels it was true. Eladio Uribe also states he is adopted but that his mother will insist he was not adopted. Presently, patient denies suicidal/homicidal ideation in addition to thoughts of self-injury. At conclusion of evaluation, patient is amenable to the recommendations of this writer including: continue psychotropic medications as prescribed. Also, patient is amenable to calling/contacting the outpatient office including this writer if any acute adverse effects of their medication regimen arise in addition to any comments or concerns pertaining to their psychiatric management. Patient is amenable to calling/contacting crisis and/or attending to the nearest emergency department if their clinical condition deteriorates to assure their safety and stability, stating that they are able to appropriately confide in their mother regarding their psychiatric state. Current Rating Scores:     None completed today.     Psychiatric Review Of Systems:    Appetite: no  Adverse eating: no  Weight changes: no  Insomnia/sleeplessness: no  Fatigue/anergy: decreased  Anhedonia/lack of interest: no  Attention/concentration: improving  Psychomotor agitation/retardation: no  Somatic symptoms: no  Anxiety/panic attack: yes  Mylene/hypomania: no  Hopelessness/helplessness/worthlessness: no  Self-injurious behavior/high-risk behavior: no  Suicidal ideation: no  Homicidal ideation: no  Auditory hallucinations: no, appears responding to internal stimuli  Visual hallucinations: no  Other perceptual disturbances: no  Delusional thinking: yes as above  Obsessive/compulsive symptoms: no    Review Of Systems:      Constitutional negative   ENT negative Cardiovascular negative   Respiratory negative   Gastrointestinal negative   Genitourinary negative   Musculoskeletal negative   Integumentary negative   Neurological negative   Endocrine negative   Other Symptoms none, all other systems are negative     Objective    OBJECTIVE:     Visit Vitals  Smoking Status Never      Wt Readings from Last 6 Encounters:   No data found for Wt        No past medical history on file. No past surgical history on file.     Meds/Allergies    No Known Allergies  Current Outpatient Medications   Medication Instructions   • ergocalciferol (VITAMIN D2) 50,000 Units, Oral, Weekly   • loxapine (LOXITANE) 5 mg capsule Take 1 capsule (5mg total) by mouth in the morning, and 4 capsules (20mg total) by mouth before bedtime for a total of 25mg per day   • risperiDONE (RISPERDAL) 2 mg, Oral, Daily at bedtime           Mental Status Exam:    Appearance age appropriate, casually dressed   Behavior cooperative   Speech normal rate, normal volume, normal pitch   Mood euthymic   Affect constricted   Thought Processes organized, goal directed   Associations intact associations   Thought Content grandiose ideas   Perceptual Disturbances: Appears to be responding to internal stimuli   Abnormal Thoughts  Risk Potential Denies suicidal or homicidal ideation, plan, or intent   Orientation oriented to person, place, time/date and situation   Memory recent and remote memory grossly intact   Consciousness alert and awake   Attention Span Concentration Span attention span and concentration are age appropriate   Intellect appears to be of average intelligence   Insight intact   Judgement intact   Muscle Strength and  Gait normal muscle strength and normal muscle tone, normal gait and normal balance   Motor Activity no abnormal movements   Language no difficulty naming common objects, no difficulty repeating a phrase, no difficulty writing a sentence   Fund of Knowledge adequate knowledge of current events  adequate fund of knowledge regarding past history  adequate fund of knowledge regarding vocabulary      Laboratory Results: I have personally reviewed all pertinent laboratory/tests results    Orders Only on 08/03/2023   Component Date Value Ref Range Status   • Prolactin 08/10/2023 46.2 (H)  4.0 - 15.2 ng/mL Final   Orders Only on 07/31/2023   Component Date Value Ref Range Status   • White Blood Cell Count 07/31/2023 4.9  3.4 - 10.8 x10E3/uL Final   • Red Blood Cell Count 07/31/2023 4.87  4.14 - 5.80 x10E6/uL Final   • Hemoglobin 07/31/2023 14.4  13.0 - 17.7 g/dL Final   • HCT 07/31/2023 42.7  37.5 - 51.0 % Final   • MCV 07/31/2023 88  79 - 97 fL Final   • MCH 07/31/2023 29.6  26.6 - 33.0 pg Final   • MCHC 07/31/2023 33.7  31.5 - 35.7 g/dL Final   • RDW 07/31/2023 12.2  11.6 - 15.4 % Final   • Platelet Count 79/88/2588 284  150 - 450 x10E3/uL Final   • Neutrophils 07/31/2023 57  Not Estab. % Final   • Lymphocytes 07/31/2023 31  Not Estab. % Final   • Monocytes 07/31/2023 7  Not Estab. % Final   • Eosinophils 07/31/2023 4  Not Estab. % Final   • Basophils PCT 07/31/2023 1  Not Estab. % Final   • Neutrophils (Absolute) 07/31/2023 2.8  1.4 - 7.0 x10E3/uL Final   • Lymphocytes (Absolute) 07/31/2023 1.5  0.7 - 3.1 x10E3/uL Final   • Monocytes (Absolute) 07/31/2023 0.4  0.1 - 0.9 x10E3/uL Final   • Eosinophils (Absolute) 07/31/2023 0.2  0.0 - 0.4 x10E3/uL Final   • Basophils ABS 07/31/2023 0.1  0.0 - 0.2 x10E3/uL Final   • Immature Granulocytes 07/31/2023 0  Not Estab. % Final   • Immature Granulocytes (Absolute) 07/31/2023 0.0  0.0 - 0.1 x10E3/uL Final   Office Visit on 07/26/2023   Component Date Value Ref Range Status   • Glucose, Random 08/10/2023 93  70 - 99 mg/dL Final   • BUN 08/10/2023 11  6 - 20 mg/dL Final   • Creatinine 08/10/2023 0.97  0.76 - 1.27 mg/dL Final   • eGFR 08/10/2023 110  >59 mL/min/1.73 Final   • SL AMB BUN/CREATININE RATIO 08/10/2023 11  9 - 20 Final   • Sodium 08/10/2023 139  134 - 144 mmol/L Final   • Potassium 08/10/2023 4.2  3.5 - 5.2 mmol/L Final   • Chloride 08/10/2023 100  96 - 106 mmol/L Final   • CO2 08/10/2023 24  20 - 29 mmol/L Final   • CALCIUM 08/10/2023 9.4  8.7 - 10.2 mg/dL Final   • Protein, Total 08/10/2023 7.1  6.0 - 8.5 g/dL Final   • Albumin 08/10/2023 5.0  4.3 - 5.2 g/dL Final   • Globulin, Total 08/10/2023 2.1  1.5 - 4.5 g/dL Final   • Albumin/Globulin Ratio 08/10/2023 2.4 (H)  1.2 - 2.2 Final   • TOTAL BILIRUBIN 08/10/2023 0.6  0.0 - 1.2 mg/dL Final   • Alk Phos Isoenzymes 08/10/2023 76  44 - 121 IU/L Final   • AST 08/10/2023 26  0 - 40 IU/L Final   • ALT 08/10/2023 25  0 - 44 IU/L Final   • TSH 08/10/2023 2.730  0.450 - 4.500 uIU/mL Final   • Cholesterol, Total 08/10/2023 156  100 - 199 mg/dL Final   • Triglycerides 08/10/2023 85  0 - 149 mg/dL Final   • HDL 08/10/2023 44  >39 mg/dL Final   • VLDL Cholesterol Calculated 08/10/2023 16  5 - 40 mg/dL Final   • LDL Calculated 08/10/2023 96  0 - 99 mg/dL Final   • LDl/HDL Ratio 08/10/2023 2.2  0.0 - 3.6 ratio Final    Comment:                                     LDL/HDL Ratio                                              Men  Women                                1/2 Avg. Risk  1.0    1.5                                    Avg.Risk  3.6    3.2                                 2X Avg. Risk  6.2    5.0                                 3X Avg. Risk  8.0    6.1     • Hemoglobin A1C 08/10/2023 5.2  4.8 - 5.6 % Final    Comment:          Prediabetes: 5.7 - 6.4           Diabetes: >6.4           Glycemic control for adults with diabetes: <7.0     • Estimated Average Glucose 08/10/2023 103  mg/dL Final   • 25-HYDROXY VIT D 08/10/2023 25.9 (L)  30.0 - 100.0 ng/mL Final    Comment: Vitamin D deficiency has been defined by the [address] practice guideline as a  level of serum 25-OH vitamin D less than 20 ng/mL (1,2).   The Endocrine Society went on to further define vitamin D  insufficiency as a level between 21 and 29 ng/mL (2). 1. IOM ([place] of [employer]). 2010. Dietary reference     intakes for calcium and D. [place]: The     [employer]. 2. Iwona MF, Yaritza STERN, Chandan HERNANDEZ, et al.     Evaluation, treatment, and prevention of vitamin D     deficiency: an Endocrine Society clinical practice     guideline. JCEM. 2011 Jul; 96(7):1911-30.               ___________________________________    History Review: The following portions of the patient's history were reviewed and updated as appropriate: allergies, current medications, past family history, past medical history, past social history, past surgical history and problem list.     Unchanged information from this writer's previous assessment is copied and italicized; information that has changed is bolded.     Past Psychiatric History:      Past psychiatric diagnoses:   • Schizophrenia, schizoaffective d/o, anxiety, depression   Inpatient psychiatric admissions:   • 10 times total (4 were involuntary)  • First - age 25 after stealing sisters klonopin  • Most common reason: first few were "no reason"  • 2 suicide attempts/SI: SI and wanting to jump off a roof (2021) a few months later after sleeping pills, tried to hang himself 2 years ago  • Psychosis 3 times (most recent for that reason was 3 years)  • Most recent was 2 years ago  • Has stayed out of the hospital due to "taking his medications, not having suicidal ideation"  • Denies history of ECT, denies having had ICM in past.   Prior outpatient psychiatric treatment:   • Saw [facility] for 3 years  • [facility] psychiatrist at [facility] for a year before that  Past/current psychotherapy:   • No  History of suicidal attempts/gestures:   • 2: took sleeping pills (August 2021), tried to hang self two years because gabapentin   History of non-suicidal self-injurious behavior:   • None  History of violence/aggressive behaviors:   • None  Psychotropic medication trials:   • Invega, (discomfort) risperdal, zyprexa, thorazine (all unknown reasons for discontinuation)  • Abilify (bad reaction)  • Invega SCHAEFER x1 in 2019 (hurt his head)  • Ativan (caused psychosis)  Substance abuse inpatient/outpatient rehabilitation:   • Rehab in 2017 - marijuana use  Eating disorder history:   • no     Substance Abuse History:        States he was smoking a lot of marijuana for 5 years, nearly every day, stopped smoking 2019 July after he "freaked out" and thought "there were people in his basement and living in his house and torturing him. "     Denies any other recreational drug use and otherwise denies history of alcohol, illict substance, or tobacco abuse., Patient denies previous legal actions or arrests related to substance intoxication including prior DWIs/DUIs., Patient does not exhibit objective evidence of substance withdrawal during today's examination nor do they appear under the influence of any psychoactive substance.       Family Psychiatric History:      Family History   History reviewed. No pertinent family history.         Psychiatric Family History: Adopted, unknown if any history of psychiatric illness, substance use, or suicide attempts     Social History:     Developmental: Was in special education, denies IEP  Has 2 sister (48and 29years old), was adopted  Education: high school diploma/GED  Marital history: single  Children: none  Living arrangement, social support: Family is supportive, mom is supportive. Lives with mother, on a waiting list for housing, on disability for schizoaffective disorder.   Occupational History: Permanent disability. 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  , Patient denies history of arrests or violence with a deadly weapon.    history: None     Traumatic History:    Abuse: Denies  Other Traumatic Events: Bullying, and would not disclose further  .  ___________________________________    This note was not shared with the patient due to reasonable likelihood of causing patient harm    Dileep Berg MD   08/16/23

## 2023-08-17 ENCOUNTER — TELEPHONE (OUTPATIENT)
Dept: PSYCHIATRY | Facility: CLINIC | Age: 28
End: 2023-08-17

## 2023-08-17 NOTE — TELEPHONE ENCOUNTER
Received message that patient's mother had further questions about medication and labs. Discussed he should take vitamin D for 8 weeks and then obtain repeat level, and verified loxapine dosage with concurrent risperdal. Explained lab results. She reports he is not adopted, though he keeps telling people he is. In terms of family history she reports:  Maternal grandfather - schizophrenia  [place]'s half sister has bipolar disorder     She also reports he had hallucinations while taking Gantrisin, which I added to his allergies.     Rolando Hills MD

## 2023-08-30 ENCOUNTER — OFFICE VISIT (OUTPATIENT)
Dept: PSYCHIATRY | Facility: CLINIC | Age: 28
End: 2023-08-30

## 2023-08-30 DIAGNOSIS — F20.9 SCHIZOPHRENIA, UNSPECIFIED TYPE (HCC): Primary | ICD-10-CM

## 2023-08-30 RX ORDER — LOXAPINE SUCCINATE 5 MG/1
TABLET ORAL
Qty: 150 CAPSULE | Refills: 0
Start: 2023-08-30

## 2023-08-30 NOTE — PSYCH
MEDICATION MANAGEMENT NOTE      [address]: [facility]   [address]   [phone]    Name and Date of Birth:  David Connor 33 y.o. 1995 MRN: [id number]    Date of Visit: August 30, 2023     Reason for Visit: Follow-up visit regarding medication management     _____________________________    Assessment/Plan   Montana García a 33 y. o. male, Single with prior psychiatric diagnoses of schizoaffective disorder; with suicide risk factors including personal history of suicide attempt as recently as 2021, chronic mental illness, psychosis, gender (male) and never ; and medical history of vitamin D deficiency. Tex was personally seen and evaluated today at the [address] [place] [address] outpatient clinic for follow-up regarding medication management. On assessment, David Connor reported he stopped taking his loxapine because he felt too tired during the day, despite seeing the benefit in pausing his negative thoughts and improvements in frequency of talking to himself. He is still not talking to himself during evaluation but is very talkative, tangential, and expressing persecutory and grandiose delusions from the past, but in behavioral control and is easily redirectable. He is eating, sleeping, showering, and overall caring for himself. He participates in hobbies and is future oriented to continue going to the pool, and looking forward towards eventual housing placement. He continues to deny any thoughts to harm self or others, denies active hallucinations. He is agreeable to retrying the loxapine only at bedtime, and we plan to increase up to 20mg over the next two weeks and then visit again. He sent in [employer] cheek swab and we are awaiting the results.  Patient asking about vitamins for energy, discussed trying a b complex, which can be purchased over the counter, and discussed importance of continuing vitamin D. He is agreeable to the plan as outlined below. DSM-5 Diagnoses:     1. Schizophrenia, unspecified type ([address])        Treatment Recommendations/Precautions:  • Continue psychopharmacological management as follows:  o Continue Risperdal 2mg HS for psychosis  o Restart Loxapine 10mg HS, then a week later increase to 20 mg HS for psychosis  o Continue Vitamin D 50,000 units weekly  - Repeat Vitamin D level in 8 weeks after initiation of capsule (around oct 11th)  • Patient asking about vitamins for energy, discussed trying a b complex, which can be purchased over the counter, and discussed importance of continuing vitamin D.  • Labs most recently obtained 8/10/23, reviewed. • Genesight testing pending   • Would like to be placed on therapy waitlist, once medications are settled can consider this  • Follow up in 2 weeks for medication management  • Follow up with PCP for medical issues and ongoing care  • Aware of 24 hour and weekend coverage for urgent situations accessed by calling [address] Outpatient main practice number    Individual psychotherapy provided: No     Treatment Plan:     Completed and signed during the session: Not applicable - Treatment Plan not due at this session    Medications Risks/Benefits:      Risks, Benefits And Possible Side Effects Of Medications:    Risks, benefits, and possible side effects of medications explained to Martinez Perdue and he verbalizes understanding and agreement for treatment. Controlled Medication Discussion:     Not applicable    Medical Decision Making / Counseling / Coordination of Care: The following interventions are recommended: return in 2 weeks for follow up. Although patient's acute lethality risk is LOW, long-term/chronic lethality risk is mildly elevated given the risk factors listed above.  However, at the current moment, Martinez Perdue is future-oriented, forward-thinking, and demonstrates ability to act in a self-preserving manner as evidenced by volitionally seeking psychiatric evaluation and treatment today. To mitigate future risk, patient should adhere to treatment recommendations, avoid alcohol/illicit substance use, utilize community-based resources and familiar support, and prioritize mental health treatment. The diagnosis and treatment plan were reviewed with the patient. Risks, benefits, and alternatives to treatment were discussed. The importance of medication and treatment compliance was reviewed with the patient.     _____________________________________________    History of Present Illness     Chief Complaint: "I stopped that medicine because it made me tired"    SUBJECTIVE:    Mario Cloud is a 33 y.o., male, possessing a past psychiatric history significant for schizophrenia, medically complicated by vitamin D deficiency, who was personally seen and evaluated at the [address] [place] [address] outpatient clinic  for follow-up regarding medication management. At their last visit, the plan was to increase loxapine and adjust timing to 5mg in the morning and 20mg HS due to sedation in the daytime, he was also continued on risperdal, and Vitamin D capsules initiated. Nathaniel Erazoaver states that since their previous psychiatric appointment with this writer, he stopped taking the loxapine 6 days ago. He states, "I'm perfectly sane." He feels his current symptoms are due to stress, and he is stressed about thinking about the past, and his ex-neighbor who told him everything about the past and his story, back in July of 2018. He talks to himself at the pool, and has been spending time with his homeless friend. He is talkative, tangential, but redirectable and interruptible, speech is not rapid, throughout evaluation.  He reports grandiose delusions about being sexually desired by "all women" and tangential regarding stories from 2017 (was at strip club, neighbors wanted to throw him off of a roof for being desired by two women). He feels his thoughts are shifting from one stressful thing to another. He expresses persecutory delusions in [id number] his aunt and uncle moved in "just to interfere with my life" and that his aunt wanted to kill him because of a girl posting on his Facebook wall. At conclusion of evaluation, he reports he stopped the loxapine due to feeling tired but would be open to taking it all at bedtime if he could. We decided to try this plan, as we know he otherwise has found benefit from the loxapine with not talking to himself as much, his thoughts being more clear and easier to stop. Presently, patient denies suicidal/homicidal ideation in addition to thoughts of self-injury. At conclusion of evaluation, patient is amenable to the recommendations of this writer including: continue psychotropic medications as prescribed. Also, patient is amenable to calling/contacting the outpatient office including this writer if any acute adverse effects of their medication regimen arise in addition to any comments or concerns pertaining to their psychiatric management. Patient is amenable to calling/contacting crisis and/or attending to the nearest emergency department if their clinical condition deteriorates to assure their safety and stability, stating that they are able to appropriately confide in their mother regarding their psychiatric state.     Current Rating Scores:     Current PHQ-9   PHQ-2/9 Depression Screening    Little interest or pleasure in doing things: 2 - more than half the days  Feeling down, depressed, or hopeless: 2 - more than half the days  Trouble falling or staying asleep, or sleeping too much: 0 - not at all  Feeling tired or having little energy: 2 - more than half the days  Poor appetite or overeatin - not at all  Feeling bad about yourself - or that you are a failure or have let yourself or your family down: 0 - not at all  Trouble concentrating on things, such as reading the newspaper or watching television: 2 - more than half the days  Moving or speaking so slowly that other people could have noticed. Or the opposite - being so fidgety or restless that you have been moving around a lot more than usual: 0 - not at all  Thoughts that you would be better off dead, or of hurting yourself in some way: 0 - not at all  PHQ-9 Score: 8   PHQ-9 Interpretation: Mild depression        Current JESSE-7 is   JESSE-7 Flowsheet Screening    Flowsheet Row Most Recent Value   Over the last 2 weeks, how often have you been bothered by any of the following problems? Feeling nervous, anxious, or on edge 0   Not being able to stop or control worrying 0   Worrying too much about different things 0   Trouble relaxing 3   Being so restless that it is hard to sit still 3   Becoming easily annoyed or irritable 3   Feeling afraid as if something awful might happen 0   JESSE-7 Total Score 9      .     Psychiatric Review Of Systems:    Appetite: no  Adverse eating: no  Weight changes: no  Insomnia/sleeplessness: no  Fatigue/anergy: decreased  Anhedonia/lack of interest: no  Attention/concentration: no  Psychomotor agitation/retardation: yes  Somatic symptoms: no  Anxiety/panic attack: no  Mylene/hypomania: no  Hopelessness/helplessness/worthlessness: no  Self-injurious behavior/high-risk behavior: no  Suicidal ideation: no  Homicidal ideation: no  Auditory hallucinations: no  Visual hallucinations: no  Other perceptual disturbances: no  Delusional thinking: yes  Obsessive/compulsive symptoms: no    Review Of Systems:      Constitutional negative   ENT negative   Cardiovascular negative   Respiratory negative   Gastrointestinal negative   Genitourinary negative   Musculoskeletal negative   Integumentary negative   Neurological negative   Endocrine negative   Other Symptoms none, all other systems are negative     Objective    OBJECTIVE:     Visit Vitals  Smoking Status Never      Wt Readings from Last 6 Encounters:   No data found for Wt        No past medical history on file. No past surgical history on file.     Meds/Allergies    Allergies   Allergen Reactions   • Gantrisin [Sulfisoxazole] Hallucinations     Took as a child and had hallucinations per mother     Current Outpatient Medications   Medication Instructions   • ergocalciferol (VITAMIN D2) 50,000 Units, Oral, Weekly   • loxapine (LOXITANE) 5 mg capsule Take 1 capsule (5mg total) by mouth in the morning, and 4 capsules (20mg total) by mouth before bedtime for a total of 25mg per day   • risperiDONE (RISPERDAL) 2 mg, Oral, Daily at bedtime           Mental Status Exam:    Appearance age appropriate, casually dressed, wearing a hat   Behavior pacing   Speech normal rate, normal volume, normal pitch, hypertalkative, tangential   Mood euthymic   Affect constricted   Thought Processes disorganized, tangential, redirectable, goal oriented with quesitons   Associations tangential associations   Thought Content grandiose ideas   Perceptual Disturbances: Denies auditory or visual hallucinations and Does not appear to be responding to internal stimuli   Abnormal Thoughts  Risk Potential Denies suicidal or homicidal ideation, plan, or intent   Orientation oriented to person, place, time/date and situation   Memory recent and remote memory grossly intact   Consciousness alert and awake   Attention Span Concentration Span attention span and concentration are age appropriate   Intellect appears to be of average intelligence   Insight intact   Judgement intact   Muscle Strength and  Gait normal muscle strength and normal muscle tone, normal gait and normal balance   Motor Activity no abnormal movements   Language no difficulty naming common objects, no difficulty repeating a phrase, no difficulty writing a sentence   Fund of Knowledge adequate knowledge of current events  adequate fund of knowledge regarding past history  adequate fund of knowledge regarding vocabulary Laboratory Results: I have personally reviewed all pertinent laboratory/tests results    Orders Only on 08/03/2023   Component Date Value Ref Range Status   • Prolactin 08/10/2023 46.2 (H)  4.0 - 15.2 ng/mL Final   Orders Only on 07/31/2023   Component Date Value Ref Range Status   • White Blood Cell Count 07/31/2023 4.9  3.4 - 10.8 x10E3/uL Final   • Red Blood Cell Count 07/31/2023 4.87  4.14 - 5.80 x10E6/uL Final   • Hemoglobin 07/31/2023 14.4  13.0 - 17.7 g/dL Final   • HCT 07/31/2023 42.7  37.5 - 51.0 % Final   • MCV 07/31/2023 88  79 - 97 fL Final   • MCH 07/31/2023 29.6  26.6 - 33.0 pg Final   • MCHC 07/31/2023 33.7  31.5 - 35.7 g/dL Final   • RDW 07/31/2023 12.2  11.6 - 15.4 % Final   • Platelet Count 57/39/7528 284  150 - 450 x10E3/uL Final   • Neutrophils 07/31/2023 57  Not Estab. % Final   • Lymphocytes 07/31/2023 31  Not Estab. % Final   • Monocytes 07/31/2023 7  Not Estab. % Final   • Eosinophils 07/31/2023 4  Not Estab. % Final   • Basophils PCT 07/31/2023 1  Not Estab. % Final   • Neutrophils (Absolute) 07/31/2023 2.8  1.4 - 7.0 x10E3/uL Final   • Lymphocytes (Absolute) 07/31/2023 1.5  0.7 - 3.1 x10E3/uL Final   • Monocytes (Absolute) 07/31/2023 0.4  0.1 - 0.9 x10E3/uL Final   • Eosinophils (Absolute) 07/31/2023 0.2  0.0 - 0.4 x10E3/uL Final   • Basophils ABS 07/31/2023 0.1  0.0 - 0.2 x10E3/uL Final   • Immature Granulocytes 07/31/2023 0  Not Estab. % Final   • Immature Granulocytes (Absolute) 07/31/2023 0.0  0.0 - 0.1 x10E3/uL Final   Office Visit on 07/26/2023   Component Date Value Ref Range Status   • Glucose, Random 08/10/2023 93  70 - 99 mg/dL Final   • BUN 08/10/2023 11  6 - 20 mg/dL Final   • Creatinine 08/10/2023 0.97  0.76 - 1.27 mg/dL Final   • eGFR 08/10/2023 110  >59 mL/min/1.73 Final   • SL AMB BUN/CREATININE RATIO 08/10/2023 11  9 - 20 Final   • Sodium 08/10/2023 139  134 - 144 mmol/L Final   • Potassium 08/10/2023 4.2  3.5 - 5.2 mmol/L Final   • Chloride 08/10/2023 100  96 - 106 mmol/L Final   • CO2 08/10/2023 24  20 - 29 mmol/L Final   • CALCIUM 08/10/2023 9.4  8.7 - 10.2 mg/dL Final   • Protein, Total 08/10/2023 7.1  6.0 - 8.5 g/dL Final   • Albumin 08/10/2023 5.0  4.3 - 5.2 g/dL Final   • Globulin, Total 08/10/2023 2.1  1.5 - 4.5 g/dL Final   • Albumin/Globulin Ratio 08/10/2023 2.4 (H)  1.2 - 2.2 Final   • TOTAL BILIRUBIN 08/10/2023 0.6  0.0 - 1.2 mg/dL Final   • Alk Phos Isoenzymes 08/10/2023 76  44 - 121 IU/L Final   • AST 08/10/2023 26  0 - 40 IU/L Final   • ALT 08/10/2023 25  0 - 44 IU/L Final   • TSH 08/10/2023 2.730  0.450 - 4.500 uIU/mL Final   • Cholesterol, Total 08/10/2023 156  100 - 199 mg/dL Final   • Triglycerides 08/10/2023 85  0 - 149 mg/dL Final   • HDL 08/10/2023 44  >39 mg/dL Final   • VLDL Cholesterol Calculated 08/10/2023 16  5 - 40 mg/dL Final   • LDL Calculated 08/10/2023 96  0 - 99 mg/dL Final   • LDl/HDL Ratio 08/10/2023 2.2  0.0 - 3.6 ratio Final    Comment:                                     LDL/HDL Ratio                                              Men  Women                                1/2 Avg. Risk  1.0    1.5                                    Avg.Risk  3.6    3.2                                 2X Avg. Risk  6.2    5.0                                 3X Avg. Risk  8.0    6.1     • Hemoglobin A1C 08/10/2023 5.2  4.8 - 5.6 % Final    Comment:          Prediabetes: 5.7 - 6.4           Diabetes: >6.4           Glycemic control for adults with diabetes: <7.0     • Estimated Average Glucose 08/10/2023 103  mg/dL Final   • 25-HYDROXY VIT D 08/10/2023 25.9 (L)  30.0 - 100.0 ng/mL Final    Comment: Vitamin D deficiency has been defined by the [facility] practice guideline as a  level of serum 25-OH vitamin D less than 20 ng/mL (1,2). The Endocrine Society went on to further define vitamin D  insufficiency as a level between 21 and 29 ng/mL (2). 1. IOM ([place] [employer]). 2010.  Dietary reference     intakes for calcium and Phu Lovell: [employer]. 2. Iwona MF, Yaritza NC, Chandan HERNANDEZ, et al.     Evaluation, treatment, and prevention of vitamin D     deficiency: an Endocrine Society clinical practice     guideline. JCEM. 2011 Jul; 96(7):1911-30.               ___________________________________    History Review:  The following portions of the patient's history were reviewed and updated as appropriate: allergies, current medications, past family history, past medical history, past social history, past surgical history and problem list.    Unchanged information from this writer's previous assessment is copied and italicized; information that has changed is bolded.       Past Psychiatric History:      Past psychiatric diagnoses:   • Schizophrenia, schizoaffective d/o, anxiety, depression   Inpatient psychiatric admissions:   • 10 times total (4 were involuntary)  • First - age 25 after stealing sisters klonopin  • Most common reason: first few were "no reason"  • 2017 - got invega shot was a very influential moment  • 2 suicide attempts/SI: SI and wanting to jump off a roof (2021) a few months later after sleeping pills, tried to hang himself 2 years ago  • Psychosis 3 times (most recent for that reason was 3 years)  • Most recent was 2 years ago  • Has stayed out of the hospital due to "taking his medications, not having suicidal ideation"  • Denies history of ECT, denies having had ICM in past.   Prior outpatient psychiatric treatment:   • Saw Jomar Ramirez for 3 years  • [facility] for a year before that  Past/current psychotherapy:   • Worked with a therapist at [employer], stopped seeing 1-2 years ago because he left  History of suicidal attempts/gestures:   • 2: took sleeping pills (August 2021), tried to hang self two years because gabapentin   History of non-suicidal self-injurious behavior:   • None  History of violence/aggressive behaviors:   • None  Psychotropic medication trials:   • Invega, (discomfort) risperdal, zyprexa, thorazine (all unknown reasons for discontinuation)  • Abilify (bad reaction)  • Invega SCHAEFER x1 in 2019 (hurt his head)  • Ativan (caused psychosis)  Substance abuse inpatient/outpatient rehabilitation:   • Rehab in 2017 - marijuana use  Eating disorder history:   • No  Other services: used to have a  but reports he does not anymore because they ran out of things to do together     Substance Abuse History:        States he was smoking a lot of marijuana for 5 years, nearly every day, stopped smoking 2019 July after he "freaked out" and thought "there were people in his basement and living in his house and torturing him. "     Denies any other recreational drug use and otherwise denies history of alcohol, illict substance, or tobacco abuse., Patient denies previous legal actions or arrests related to substance intoxication including prior DWIs/DUIs., Patient does not exhibit objective evidence of substance withdrawal during today's examination nor do they appear under the influence of any psychoactive substance.       Family Psychiatric History:      Family History   History reviewed. No pertinent family history.         Psychiatric Family History: Adopted, unknown if any history of psychiatric illness, substance use, or suicide attempts     Social History:     Developmental: Was in special education, denies IEP  Has 2 sister (48and 29years old), states he was adopted but his mother reports he is not adopted  [employer] [facility] [employer] school diploma/GED  Marital history: single  Children: none  Living arrangement, social support: Family is supportive, mom is supportive. Lives with mother, on a waiting list for housing, on disability for schizoaffective disorder.   Dad lives in [address] [place] [address].  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  , Patient denies history of arrests or violence with a deadly weapon.    history: None     Traumatic History:      Abuse: Denies  Other Traumatic Events: Bullying, and would not disclose further  .    ___________________________________    This note was not shared with the patient due to reasonable likelihood of causing patient harm    Lorena Stockton MD   08/30/23

## 2023-08-30 NOTE — PATIENT INSTRUCTIONS
Today we discussed your current symptoms, and discussed why you stopped taking the loxapine. We discussed adjusting the loxapine to be taken all at bedtime. The plan is to take loxapine 10mg at bedtime for one week, then increase to 20mg at bedtime. (Can use the supply of medication you have at home, if you are not going to have enough please call the office and I will send more)    We also discussed starting a B complex vitamin to boost energy levels, this can be purchased over the counter  Continue taking vitamin D once per week    We will see each other in 2 weeks and increase further if this is tolerated well.

## 2023-09-20 ENCOUNTER — OFFICE VISIT (OUTPATIENT)
Dept: PSYCHIATRY | Facility: CLINIC | Age: 28
End: 2023-09-20
Payer: COMMERCIAL

## 2023-09-20 DIAGNOSIS — F20.9 SCHIZOPHRENIA, UNSPECIFIED TYPE (HCC): Primary | ICD-10-CM

## 2023-09-20 PROCEDURE — 99214 OFFICE O/P EST MOD 30 MIN: CPT | Performed by: STUDENT IN AN ORGANIZED HEALTH CARE EDUCATION/TRAINING PROGRAM

## 2023-09-20 RX ORDER — RISPERIDONE 2 MG/1
2 TABLET ORAL
Qty: 30 TABLET | Refills: 1 | Status: SHIPPED | OUTPATIENT
Start: 2023-09-20

## 2023-09-20 RX ORDER — LOXAPINE SUCCINATE 10 MG/1
TABLET ORAL
Qty: 113 CAPSULE | Refills: 0 | Status: SHIPPED | OUTPATIENT
Start: 2023-09-20

## 2023-09-20 NOTE — PATIENT INSTRUCTIONS
Please  new prescription of loxapine 10mg capsules at the pharmacy and start taking 3 (30mg) at bedtime for one week, then increase to 4 (40mg) until we see each other next  Continue to take risperdal 2mg at bedtime

## 2023-09-20 NOTE — PSYCH
MEDICATION MANAGEMENT NOTE      [address]: [facility]   [address]   [phone]    Name and Date of Birth:  Jody Geronimo 33 y.o. 1995 MRN: [id number]    Date of Visit: September 20, 2023    Reason for Visit: Follow-up visit regarding medication management     _____________________________    Assessment/Plan   Chapis Boyd a 33 y. o. male, Single with prior psychiatric diagnoses of schizoaffective disorder; with suicide risk factors including personal history of suicide attempt as recently as 2021, chronic mental illness, psychosis, gender (male) and never ; and medical history of vitamin D deficiency. Tex was personally seen and evaluated today at the [address] [place] [address] outpatient clinic for follow-up regarding medication management    On assessment, Jody Geronimo reports he has been taking the increased dose of loxapine, initially noticed a few days with significant improvement, but feels that he is under more stress these last several days. He reports ongoing issues with reliving traumatic experiences and conversations with people who have bullied him, hearing the voice of his bully saying negative things about him. He has been better able to control his talking to himself, and has found it more enjoyable to do so, but still does often talk to himself when he is alone. Due to the benefit that has been noted on the loxapine, we will continue to further optimize it. We will continue to discuss adjusting regimen, including possibility of transitioning from Risperdal to another agent.     DSM-5 Diagnoses:     1. Schizophrenia, unspecified type ([facility])        Treatment Recommendations/Precautions:  • Continue psychopharmacological management as follows:  o Continue Risperdal 2 mg at bedtime for psychosis  o Increase loxapine to 30 mg at bedtime for 1 week, then increase to 40 mg at bedtime for psychosis  • Labs most recently obtained August 2023, reviewed. • Follow up in 4-6 weeks for medication management  • Follow up with PCP for medical issues and ongoing care  • Awaiting genesight results  • Aware of 24 hour and weekend coverage for urgent situations accessed by calling [facility] main practice number    Individual psychotherapy provided: No     Treatment Plan:     Completed and signed during the session: Not applicable - Treatment Plan not due at this session    Medications Risks/Benefits:      Risks, Benefits And Possible Side Effects Of Medications:    Risks, benefits, and possible side effects of medications explained to [facility] and he verbalizes understanding and agreement for treatment. Controlled Medication Discussion:     Not applicable    Medical Decision Making / Counseling / Coordination of Care: The following interventions are recommended: return in 6 weeks for follow up. Although patient's acute lethality risk is LOW, long-term/chronic lethality risk is mildly elevated given the risk factors listed above. However, at the current moment, [facility] is future-oriented, forward-thinking, and demonstrates ability to act in a self-preserving manner as evidenced by volitionally seeking psychiatric evaluation and treatment today. To mitigate future risk, patient should adhere to treatment recommendations, avoid alcohol/illicit substance use, utilize community-based resources and familiar support, and prioritize mental health treatment. The diagnosis and treatment plan were reviewed with the patient. Risks, benefits, and alternatives to treatment were discussed.  The importance of medication and treatment compliance was reviewed with the patient.     _____________________________________________    History of Present Illness     Chief Complaint: "I felt better for a few days"    SUBJECTIVE:    Lynne Gibbs is a 33 y.o. male, possessing a past psychiatric history significant for schizophrenia, who was personally seen and evaluated at the [address] [facility] outpatient clinic  for follow-up regarding medication management. At their last visit, the plan was to continue risperdal 2mg HS, restart loxapine 10mg to increase to 20mg HS and continue vitamin D until mid October then recheck. Kati Wheeler states that since their previous psychiatric appointment with this writer, he has been stable. He had two very good days, went up to the 20mg at bedtime, feels the medication is now not working as well. He states he does not have outbursts but feels irritable. He is still hearing the voice of his neighbor telling him terrible things about himself. Still believes he was adopted, thinks his birth mom left him on a porch and he was adopted by a neighbor then by his current parents. He finds it more enjoyable to talk to himself, is able to control this better but is still talking to himself when he is alone. He is less psychomotor agitated on exam, and is not noted to be speaking to himself. Is more spontaneous. Presently, patient denies suicidal/homicidal ideation in addition to thoughts of self-injury. At conclusion of evaluation, patient is amenable to the recommendations of this writer including: continue psychotropic medications as prescribed. Also, patient is amenable to calling/contacting the outpatient office including this writer if any acute adverse effects of their medication regimen arise in addition to any comments or concerns pertaining to their psychiatric management. Patient is amenable to calling/contacting crisis and/or attending to the nearest emergency department if their clinical condition deteriorates to assure their safety and stability, stating that they are able to appropriately confide in their mother regarding their psychiatric state. Current Rating Scores:     None completed today.     Psychiatric Review Of Systems:    Appetite: no  Adverse eating: no  Weight changes: no  Insomnia/sleeplessness: no  Fatigue/anergy: no  Anhedonia/lack of interest: no  Attention/concentration: decreased  Psychomotor agitation/retardation: yes  Somatic symptoms: no  Anxiety/panic attack: slightly more than usual  Mylene/hypomania: no  Hopelessness/helplessness/worthlessness: no  Self-injurious behavior/high-risk behavior: no  Suicidal ideation: no  Homicidal ideation: no  Auditory hallucinations: yes  Visual hallucinations: no  Other perceptual disturbances: no  Delusional thinking: yes  Obsessive/compulsive symptoms: no    Review Of Systems:      Constitutional negative   ENT negative   Cardiovascular negative   Respiratory negative   Gastrointestinal negative   Genitourinary negative   Musculoskeletal negative   Integumentary negative   Neurological negative   Endocrine negative   Other Symptoms none, all other systems are negative     Objective    OBJECTIVE:     Visit Vitals  Smoking Status Never      Wt Readings from Last 6 Encounters:   No data found for Wt        History reviewed. No pertinent past medical history. History reviewed. No pertinent surgical history.     Meds/Allergies    Allergies   Allergen Reactions   • Gantrisin [Sulfisoxazole] Hallucinations     Took as a child and had hallucinations per mother     Current Outpatient Medications   Medication Instructions   • ergocalciferol (VITAMIN D2) 50,000 Units, Oral, Weekly   • loxapine (LOXITANE) 10 mg capsule Take 3 capsules (30mg total) by mouth at bedtime for one week, then 4 capsules (40mg) by mouth at bedtime   • risperiDONE (RISPERDAL) 2 mg, Oral, Daily at bedtime           Mental Status Exam:    Appearance age appropriate, casually dressed   Behavior cooperative, calm   Speech normal rate, normal volume, normal pitch   Mood anxious   Affect blunted   Thought Processes illogical, tangential   Associations tangential associations   Thought Content paranoid ideation, ideas of reference, grandiose ideas, ruminating thoughts   Perceptual Disturbances: Reports hearing voice of neighbor and Appears to be internally preoccupied   Abnormal Thoughts  Risk Potential Denies suicidal or homicidal ideation, plan, or intent   Orientation oriented to person, place, time/date and situation   Memory recent and remote memory grossly intact   Consciousness alert and awake   Attention Span Concentration Span attention span and concentration are age appropriate   Intellect appears to be of average intelligence   Insight improving   Judgement improving   Muscle Strength and  Gait normal muscle strength and normal muscle tone, normal gait and normal balance   Motor Activity no abnormal movements   Language no difficulty naming common objects, no difficulty repeating a phrase, no difficulty writing a sentence   Fund of Knowledge adequate knowledge of current events  adequate fund of knowledge regarding past history  adequate fund of knowledge regarding vocabulary      Laboratory Results: I have personally reviewed all pertinent laboratory/tests results    Orders Only on 08/03/2023   Component Date Value Ref Range Status   • Prolactin 08/10/2023 46.2 (H)  4.0 - 15.2 ng/mL Final   Orders Only on 07/31/2023   Component Date Value Ref Range Status   • White Blood Cell Count 07/31/2023 4.9  3.4 - 10.8 x10E3/uL Final   • Red Blood Cell Count 07/31/2023 4.87  4.14 - 5.80 x10E6/uL Final   • Hemoglobin 07/31/2023 14.4  13.0 - 17.7 g/dL Final   • HCT 07/31/2023 42.7  37.5 - 51.0 % Final   • MCV 07/31/2023 88  79 - 97 fL Final   • MCH 07/31/2023 29.6  26.6 - 33.0 pg Final   • MCHC 07/31/2023 33.7  31.5 - 35.7 g/dL Final   • RDW 07/31/2023 12.2  11.6 - 15.4 % Final   • Platelet Count 53/24/3938 284  150 - 450 x10E3/uL Final   • Neutrophils 07/31/2023 57  Not Estab. % Final   • Lymphocytes 07/31/2023 31  Not Estab. % Final   • Monocytes 07/31/2023 7  Not Estab. % Final   • Eosinophils 07/31/2023 4  Not Estab. % Final   • Basophils PCT 07/31/2023 1  Not Estab. % Final   • Neutrophils (Absolute) 07/31/2023 2.8  1.4 - 7.0 x10E3/uL Final   • Lymphocytes (Absolute) 07/31/2023 1.5  0.7 - 3.1 x10E3/uL Final   • Monocytes (Absolute) 07/31/2023 0.4  0.1 - 0.9 x10E3/uL Final   • Eosinophils (Absolute) 07/31/2023 0.2  0.0 - 0.4 x10E3/uL Final   • Basophils ABS 07/31/2023 0.1  0.0 - 0.2 x10E3/uL Final   • Immature Granulocytes 07/31/2023 0  Not Estab. % Final   • Immature Granulocytes (Absolute) 07/31/2023 0.0  0.0 - 0.1 x10E3/uL Final   Office Visit on 07/26/2023   Component Date Value Ref Range Status   • Glucose, Random 08/10/2023 93  70 - 99 mg/dL Final   • BUN 08/10/2023 11  6 - 20 mg/dL Final   • Creatinine 08/10/2023 0.97  0.76 - 1.27 mg/dL Final   • eGFR 08/10/2023 110  >59 mL/min/1.73 Final   • SL AMB BUN/CREATININE RATIO 08/10/2023 11  9 - 20 Final   • Sodium 08/10/2023 139  134 - 144 mmol/L Final   • Potassium 08/10/2023 4.2  3.5 - 5.2 mmol/L Final   • Chloride 08/10/2023 100  96 - 106 mmol/L Final   • CO2 08/10/2023 24  20 - 29 mmol/L Final   • CALCIUM 08/10/2023 9.4  8.7 - 10.2 mg/dL Final   • Protein, Total 08/10/2023 7.1  6.0 - 8.5 g/dL Final   • Albumin 08/10/2023 5.0  4.3 - 5.2 g/dL Final   • Globulin, Total 08/10/2023 2.1  1.5 - 4.5 g/dL Final   • Albumin/Globulin Ratio 08/10/2023 2.4 (H)  1.2 - 2.2 Final   • TOTAL BILIRUBIN 08/10/2023 0.6  0.0 - 1.2 mg/dL Final   • Alk Phos Isoenzymes 08/10/2023 76  44 - 121 IU/L Final   • AST 08/10/2023 26  0 - 40 IU/L Final   • ALT 08/10/2023 25  0 - 44 IU/L Final   • TSH 08/10/2023 2.730  0.450 - 4.500 uIU/mL Final   • Cholesterol, Total 08/10/2023 156  100 - 199 mg/dL Final   • Triglycerides 08/10/2023 85  0 - 149 mg/dL Final   • HDL 08/10/2023 44  >39 mg/dL Final   • VLDL Cholesterol Calculated 08/10/2023 16  5 - 40 mg/dL Final   • LDL Calculated 08/10/2023 96  0 - 99 mg/dL Final   • LDl/HDL Ratio 08/10/2023 2.2  0.0 - 3.6 ratio Final    Comment: LDL/HDL Ratio                                              Men  Women                                1/2 Avg. Risk  1.0    1.5                                    Avg.Risk  3.6    3.2                                 2X Avg. Risk  6.2    5.0                                 3X Avg. Risk  8.0    6.1     • Hemoglobin A1C 08/10/2023 5.2  4.8 - 5.6 % Final    Comment:          Prediabetes: 5.7 - 6.4           Diabetes: >6.4           Glycemic control for adults with diabetes: <7.0     • Estimated Average Glucose 08/10/2023 103  mg/dL Final   • 25-HYDROXY VIT D 08/10/2023 25.9 (L)  30.0 - 100.0 ng/mL Final    Comment: Vitamin D deficiency has been defined by the [address] [facility] practice guideline as a  level of serum 25-OH vitamin D less than 20 ng/mL (1,2). The Endocrine Society went on to further define vitamin D  insufficiency as a level between 21 and 29 ng/mL (2). 1. IOM ([place] of Medicine). 2010. Dietary reference     intakes for calcium and D. Violet: The     Placed. 2. Iwona MF, Yaritza STERN, Chandan HERNANDEZ, et al.     Evaluation, treatment, and prevention of vitamin D     deficiency: an Endocrine Society clinical practice     guideline. JCEM. 2011 Jul; 96(7):1911-30.               ___________________________________    History Review:  The following portions of the patient's history were reviewed and updated as appropriate: allergies, current medications, past family history, past medical history, past social history, past surgical history and problem list.    Unchanged information from this writer's previous assessment is copied and italicized; information that has changed is bolded.       Past Psychiatric History:      Past psychiatric diagnoses:   • Schizophrenia, schizoaffective d/o, anxiety, depression   Inpatient psychiatric admissions:   • 10 times total (4 were involuntary)  • First - age 25 after stealing sisters klonopin  • Most common reason: first few were "no reason"  • 2017 - got invega shot was a very influential moment  • 2 suicide attempts/SI: SI and wanting to jump off a roof (2021) a few months later after sleeping pills, tried to hang himself 2 years ago  • Psychosis 3 times (most recent for that reason was 3 years)  • Most recent was 2 years ago  • Has stayed out of the hospital due to "taking his medications, not having suicidal ideation"  • Denies history of ECT, denies having had ICM in past.   Prior outpatient psychiatric treatment:   • Saw [facility] for 3 years  • Saw psychiatrist at [facility] for a year before that  Past/current psychotherapy:   • Worked with a therapist at [email or web address], stopped seeing 1-2 years ago because he left  History of suicidal attempts/gestures:   • 2: took sleeping pills (August 2021), tried to hang self two years because gabapentin   History of non-suicidal self-injurious behavior:   • None  History of violence/aggressive behaviors:   • None  Psychotropic medication trials:   • Invega, (discomfort) risperdal, zyprexa, thorazine (all unknown reasons for discontinuation)  • Abilify (bad reaction)  • Invega SCHAEFER x1 in 2019 (hurt his head)  • Ativan (caused psychosis)  Substance abuse inpatient/outpatient rehabilitation:   • Rehab in 2017 - marijuana use  Eating disorder history:   • No  Other services: used to have a  but reports he does not anymore because they ran out of things to do together      Substance Abuse History:        States he was smoking a lot of marijuana for 5 years, nearly every day, stopped smoking 2019 July after he "freaked out" and thought "there were people in his basement and living in his house and torturing him. "     Denies any other recreational drug use and otherwise denies history of alcohol, illict substance, or tobacco abuse., Patient denies previous legal actions or arrests related to substance intoxication including prior DWIs/DUIs., Patient does not exhibit objective evidence of substance withdrawal during today's examination nor do they appear under the influence of any psychoactive substance.       Family Psychiatric History:      Family History   History reviewed. No pertinent family history.         Psychiatric Family History: Adopted, unknown if any history of psychiatric illness, substance use, or suicide attempts     Social History:     Developmental: Was in special education, denies IEP  Has 2 sister (48and 29years old), states he was adopted but his mother reports he is not adopted  [employer] school diploma/GED  Marital history: single  Children: none  Living arrangement, social support: Family is supportive, mom is supportive. Lives with mother, on a waiting list for housing, on disability for schizoaffective disorder.   Dad lives in [address] [place] [address]. 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  , Patient denies history of arrests or violence with a deadly weapon.    history: None     Traumatic History:      Abuse: Denies  Other Traumatic Events: Bullying, and would not disclose further    ___________________________________    This note was not shared with the patient due to reasonable likelihood of causing patient harm    Sam Snider MD   09/20/23

## 2023-10-04 ENCOUNTER — TELEPHONE (OUTPATIENT)
Dept: PSYCHIATRY | Facility: CLINIC | Age: 28
End: 2023-10-04

## 2023-10-04 NOTE — TELEPHONE ENCOUNTER
Called back to discuss her concerns regarding Tex's current condition and symptoms. He has been taking loxapine 20mg, reported head pain when he took 30mg for two nights. Encouraged her to encourage him to try and take the 10mg separate from 20mg to see if this helps prevent the head pain, can take in the morning. They are unable to change to a sooner appointment based on my availability. Will plan to discuss options with Duane Clifford at our next appointment.

## 2023-10-17 LAB — 25(OH)D3+25(OH)D2 SERPL-MCNC: 38.1 NG/ML (ref 30–100)

## 2023-10-25 ENCOUNTER — OFFICE VISIT (OUTPATIENT)
Dept: PSYCHIATRY | Facility: CLINIC | Age: 28
End: 2023-10-25
Payer: COMMERCIAL

## 2023-10-25 ENCOUNTER — TELEPHONE (OUTPATIENT)
Dept: PSYCHIATRY | Facility: CLINIC | Age: 28
End: 2023-10-25

## 2023-10-25 DIAGNOSIS — Z15.89 HOMOZYGOUS MTHFR MUTATION C677T: ICD-10-CM

## 2023-10-25 DIAGNOSIS — Z51.81 ENCOUNTER FOR MEDICATION MONITORING: ICD-10-CM

## 2023-10-25 DIAGNOSIS — F20.9 SCHIZOPHRENIA, UNSPECIFIED TYPE (HCC): Primary | ICD-10-CM

## 2023-10-25 DIAGNOSIS — F41.9 ANXIETY: Primary | ICD-10-CM

## 2023-10-25 DIAGNOSIS — E55.9 VITAMIN D DEFICIENCY: ICD-10-CM

## 2023-10-25 PROCEDURE — 99214 OFFICE O/P EST MOD 30 MIN: CPT | Performed by: STUDENT IN AN ORGANIZED HEALTH CARE EDUCATION/TRAINING PROGRAM

## 2023-10-25 RX ORDER — VITAMIN B COMPLEX
1 CAPSULE ORAL DAILY
Qty: 30 CAPSULE | Refills: 1 | Status: SHIPPED | OUTPATIENT
Start: 2023-10-25

## 2023-10-25 RX ORDER — CLOZAPINE 25 MG/1
TABLET ORAL
Qty: 78 TABLET | Refills: 0 | Status: SHIPPED | OUTPATIENT
Start: 2023-10-25 | End: 2023-11-24

## 2023-10-25 RX ORDER — VITAMIN B COMPLEX
1 CAPSULE ORAL DAILY
Qty: 30 CAPSULE | Refills: 1 | Status: SHIPPED | OUTPATIENT
Start: 2023-10-25 | End: 2023-10-25 | Stop reason: SDUPTHER

## 2023-10-25 RX ORDER — CLOZAPINE 25 MG/1
TABLET ORAL
Qty: 78 TABLET | Refills: 0 | Status: SHIPPED | OUTPATIENT
Start: 2023-10-25 | End: 2023-10-25 | Stop reason: SDUPTHER

## 2023-10-25 RX ORDER — SWAB
1 SWAB, NON-MEDICATED MISCELLANEOUS DAILY
Qty: 30 CAPSULE | Refills: 1 | Status: SHIPPED | OUTPATIENT
Start: 2023-10-25

## 2023-10-25 RX ORDER — CLONAZEPAM 0.5 MG/1
0.5 TABLET ORAL ONCE
Qty: 1 TABLET | Refills: 0 | Status: SHIPPED | OUTPATIENT
Start: 2023-10-25 | End: 2023-10-25

## 2023-10-25 RX ORDER — LOXAPINE SUCCINATE 10 MG/1
20 TABLET ORAL
Qty: 60 CAPSULE | Refills: 0
Start: 2023-10-25

## 2023-10-25 RX ORDER — SWAB
1 SWAB, NON-MEDICATED MISCELLANEOUS DAILY
Qty: 30 CAPSULE | Refills: 1 | Status: SHIPPED | OUTPATIENT
Start: 2023-10-25 | End: 2023-10-25 | Stop reason: SDUPTHER
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
## 2023-10-25 NOTE — TELEPHONE ENCOUNTER
Mom, Gómez Houston called requesting a call back from Dr. Marycruz Arias to discuss client's appointment.

## 2023-10-26 DIAGNOSIS — F20.9 SCHIZOPHRENIA, UNSPECIFIED TYPE (HCC): Primary | ICD-10-CM

## 2023-10-26 LAB
BASOPHILS # BLD AUTO: 0.1 X10E3/UL (ref 0–0.2)
BASOPHILS NFR BLD AUTO: 1 %
EOSINOPHIL # BLD AUTO: 0.2 X10E3/UL (ref 0–0.4)
EOSINOPHIL NFR BLD AUTO: 4 %
ERYTHROCYTE [DISTWIDTH] IN BLOOD BY AUTOMATED COUNT: 12.2 % (ref 11.6–15.4)
HCT VFR BLD AUTO: 38.8 % (ref 37.5–51)
HGB BLD-MCNC: 13.3 G/DL (ref 13–17.7)
IMM GRANULOCYTES # BLD: 0 X10E3/UL (ref 0–0.1)
IMM GRANULOCYTES NFR BLD: 0 %
LYMPHOCYTES # BLD AUTO: 1.4 X10E3/UL (ref 0.7–3.1)
LYMPHOCYTES NFR BLD AUTO: 24 %
MCH RBC QN AUTO: 29.9 PG (ref 26.6–33)
MCHC RBC AUTO-ENTMCNC: 34.3 G/DL (ref 31.5–35.7)
MCV RBC AUTO: 87 FL (ref 79–97)
MONOCYTES # BLD AUTO: 0.5 X10E3/UL (ref 0.1–0.9)
MONOCYTES NFR BLD AUTO: 9 %
NEUTROPHILS # BLD AUTO: 3.6 X10E3/UL (ref 1.4–7)
NEUTROPHILS NFR BLD AUTO: 62 %
PLATELET # BLD AUTO: 256 X10E3/UL (ref 150–450)
RBC # BLD AUTO: 4.45 X10E6/UL (ref 4.14–5.8)
WBC # BLD AUTO: 5.8 X10E3/UL (ref 3.4–10.8)

## 2023-10-26 NOTE — PROGRESS NOTES
Ordering EKG as part of initiation for clozaril to be obtained within 1-2 weeks, no updated EKG on file. Will ask staff to mail slip to patient for completion.

## 2023-11-01 LAB
BASOPHILS # BLD AUTO: 0.1 X10E3/UL (ref 0–0.2)
BASOPHILS NFR BLD AUTO: 1 %
EOSINOPHIL # BLD AUTO: 0.2 X10E3/UL (ref 0–0.4)
EOSINOPHIL NFR BLD AUTO: 4 %
ERYTHROCYTE [DISTWIDTH] IN BLOOD BY AUTOMATED COUNT: 12 % (ref 11.6–15.4)
HCT VFR BLD AUTO: 40.6 % (ref 37.5–51)
HGB BLD-MCNC: 13.7 G/DL (ref 13–17.7)
IMM GRANULOCYTES # BLD: 0 X10E3/UL (ref 0–0.1)
IMM GRANULOCYTES NFR BLD: 0 %
LYMPHOCYTES # BLD AUTO: 1.4 X10E3/UL (ref 0.7–3.1)
LYMPHOCYTES NFR BLD AUTO: 21 %
MCH RBC QN AUTO: 29.5 PG (ref 26.6–33)
MCHC RBC AUTO-ENTMCNC: 33.7 G/DL (ref 31.5–35.7)
MCV RBC AUTO: 88 FL (ref 79–97)
MONOCYTES # BLD AUTO: 0.6 X10E3/UL (ref 0.1–0.9)
MONOCYTES NFR BLD AUTO: 9 %
NEUTROPHILS # BLD AUTO: 4.4 X10E3/UL (ref 1.4–7)
NEUTROPHILS NFR BLD AUTO: 65 %
PLATELET # BLD AUTO: 297 X10E3/UL (ref 150–450)
RBC # BLD AUTO: 4.64 X10E6/UL (ref 4.14–5.8)
WBC # BLD AUTO: 6.7 X10E3/UL (ref 3.4–10.8)

## 2023-11-02 ENCOUNTER — TELEPHONE (OUTPATIENT)
Dept: PSYCHIATRY | Facility: CLINIC | Age: 28
End: 2023-11-02

## 2023-11-02 NOTE — TELEPHONE ENCOUNTER
Returned her call, discussed her concerns regarding side effects if dosage increases. Kvng Fuentes is tolerating the medication well and no side effects, appearing to have some benefit already. She expressed some concerns about her home readings of his pulse, which she states can get up to 102 typically. He will be seeing his PCP next Thursday and will be getting EKG completed at that visit, will discuss with PCP, and she plans to drop off a copy of the EKG to [facility] if unable to be faxed.      Cassandra Hernandes MD

## 2023-11-02 NOTE — TELEPHONE ENCOUNTER
Mom, Isai Veliz, would like to speak with Dr. Alamo [place] regarding client's medication; expresses concern about increasing dose this Saturday (ie: side effects). Please contact at Arnol Mckoy.

## 2023-11-09 LAB
BASOPHILS # BLD AUTO: 0.1 X10E3/UL (ref 0–0.2)
BASOPHILS NFR BLD AUTO: 1 %
EOSINOPHIL # BLD AUTO: 0.4 X10E3/UL (ref 0–0.4)
EOSINOPHIL NFR BLD AUTO: 4 %
ERYTHROCYTE [DISTWIDTH] IN BLOOD BY AUTOMATED COUNT: 12.2 % (ref 11.6–15.4)
HCT VFR BLD AUTO: 42.2 % (ref 37.5–51)
HGB BLD-MCNC: 14.1 G/DL (ref 13–17.7)
IMM GRANULOCYTES # BLD: 0.1 X10E3/UL (ref 0–0.1)
IMM GRANULOCYTES NFR BLD: 1 %
LYMPHOCYTES # BLD AUTO: 1.8 X10E3/UL (ref 0.7–3.1)
LYMPHOCYTES NFR BLD AUTO: 19 %
MCH RBC QN AUTO: 29.1 PG (ref 26.6–33)
MCHC RBC AUTO-ENTMCNC: 33.4 G/DL (ref 31.5–35.7)
MCV RBC AUTO: 87 FL (ref 79–97)
MONOCYTES # BLD AUTO: 1 X10E3/UL (ref 0.1–0.9)
MONOCYTES NFR BLD AUTO: 11 %
NEUTROPHILS # BLD AUTO: 6.1 X10E3/UL (ref 1.4–7)
NEUTROPHILS NFR BLD AUTO: 64 %
PLATELET # BLD AUTO: 318 X10E3/UL (ref 150–450)
RBC # BLD AUTO: 4.85 X10E6/UL (ref 4.14–5.8)
WBC # BLD AUTO: 9.4 X10E3/UL (ref 3.4–10.8)

## 2023-11-21 ENCOUNTER — TELEPHONE (OUTPATIENT)
Dept: PSYCHIATRY | Facility: CLINIC | Age: 28
End: 2023-11-21

## 2023-11-21 NOTE — TELEPHONE ENCOUNTER
Notified that Tex's mother called regarding the lab results, returned her phone call to discuss. Discussed with her that the elevations in his labs look like what we would see if somebody is experiencing an illness such as a virus. She states that he is coming down with some sort of illness including having fever/chills last night. She expressed concern regarding his [place]berg specifically, as the pharmacist had reminded her that this is the number we are monitoring for patients taking clozapine. This writer explained that the possible bad side effect with clozapine that we are specifically monitoring for would be that the Nicholasberg becomes too low. Discussed that the elevated ANC in the setting of illness means his body is doing exactly what it should be doing to fight off an infection, and is no cause for concern specifically with clozapine. She expressed relief at this. We discussed that if he is having a fever that he likely should not come into the office tomorrow due to risk of infecting others, and asked if he would be able to do a virtual visit. She states he has done that in the past and this would be no problem to switch to virtual for this visit.

## 2023-11-21 NOTE — TELEPHONE ENCOUNTER
Called patient to discuss lab results, left voicemail instructing to call back, or we can discuss at his appointment tomorrow if that is preferable.     Belen Dominique MD

## 2023-11-22 ENCOUNTER — TELEMEDICINE (OUTPATIENT)
Dept: PSYCHIATRY | Facility: CLINIC | Age: 28
End: 2023-11-22
Payer: COMMERCIAL

## 2023-11-22 DIAGNOSIS — F20.9 SCHIZOPHRENIA, UNSPECIFIED TYPE (HCC): Primary | ICD-10-CM

## 2023-11-22 DIAGNOSIS — E55.9 VITAMIN D DEFICIENCY: ICD-10-CM

## 2023-11-22 PROCEDURE — 99214 OFFICE O/P EST MOD 30 MIN: CPT | Performed by: STUDENT IN AN ORGANIZED HEALTH CARE EDUCATION/TRAINING PROGRAM

## 2023-11-22 RX ORDER — CLOZAPINE 25 MG/1
50 TABLET ORAL DAILY
Qty: 14 TABLET | Refills: 0 | Status: SHIPPED | OUTPATIENT
Start: 2023-11-22 | End: 2023-11-24 | Stop reason: SDUPTHER

## 2023-11-22 NOTE — PATIENT INSTRUCTIONS
During this visit we discussed Tex's progress on his current medication. However, today Dariela Arora is not feeling well, and feels strongly this is due to the medication. The clinician recommendation would be to continue the clozapine at the current dosage and discontinue one of his other medications. Dariela Arora does not wish to take the clozapine anymore. We will taper and discontinue the clozapine by taking 50mg for one week, then discontinuing. He will need to get one more set of blood work next week. He should continue taking his other medications (loxapine 20 mg and risperidone 2 mg) as prescribed.     ----    Please call the office nursing staff for medication issues including refills, problems getting medications, bothersome side effects, etc at [phone]. Please return for a follow up appointment as discussed and arrive approximately 15 minutes prior to your appointment time. If you are running late or are unable to attend your appointment, please call our [facility] office at [phone], or if you were seen in the [facility]) office, please call [phone]    If you have thoughts of harming yourself or are otherwise in psychological crisis, do not hesitate to contact your [address], or 911 or go to the nearest emergency room.   [facility] Crisis: [address] Crisis: [phone]  [address] [facility] Crisis: [address] [facility] Crisis: [address] [facility] Crisis: [address] Crisis: [address] Crisis: [phone]  National Suicide Prevention Hotline: 4-607-244-0722 or call 65

## 2023-11-22 NOTE — PSYCH
MEDICATION MANAGEMENT NOTE      [address]: [facility]   [address]   [phone]    Virtual Visit Disclaimer & Required Documentation  TeleMed provider: Sam Snider MD and Dr. Ana Leger, located at located at [address] at [facility], [address] [facility],[address]. The patient is also located in [place] which is the state in which I hold an active license. The patient was identified by name and date of birth. David Connor was informed that this is a telemedicine visit and that the visit is being conducted through [facility] and patient was informed this is a secure, HIPAA-complaint platform. He agrees to proceed. My office door was closed. No one else was in the room. He acknowledged consent and understanding of privacy and security of the video platform. The patient has agreed to participate and understands they can discontinue the visit at any time. David Connor verbally agrees to participate in [facility]. Pt is aware that [facility] could be limited without vital signs or the ability to perform a full hands-on physical exam. The patient understands he or the provider may request at any time to terminate the video visit and request the patient to seek care or treatment in person. Patient is aware this is a billable service.         Name and Date of Birth:  David Connor 33 y.o. 1995 MRN: [id number]    Date of Visit: November 22, 2023    Reason for Visit: Follow-up visit regarding medication management     _____________________________    Assessment/Plan   David Connor is a 33 y.o. male, Single, domiciled with mother, on permanent disability, with past medical history of Vitamin D deficiency, prior psychiatric diagnoses of schizoaffective disorder; multiple hospitalizations and two suicide attempts (most recently 2021).  Darlin Mello was personally seen and evaluated today at the [address] [facility] outpatient clinic for follow-up regarding medication management. On assessment, Oscar Linn reports that although he has been feeling well on the medication for the most part, he does not wish to continue the clozapine due to how he is feeling today. He reports symptoms such as chills, fatigue, tingling of skin, "numbness" in head, which he is attributing to the clozapine. Though the clinician recommendation would be to continue clozapine and discontinue loxapine to reduce polypharmacy, and monitor these symptoms which may be caused by a common virus, Darlin Mello is choosing to discontinue the clozapine at this time per his preference. He is understanding of the need to taper the clozapine for a week and then discontinue to minimize discontinuation symptoms. We will not be starting a new medication, and he will continue current dosages of loxapine and clozapine. DSM-5 Diagnoses:     1. Schizophrenia, unspecified type ([address])    2.  Vitamin D deficiency        Treatment Recommendations/Precautions:  Continue psychopharmacological management as follows:  Loxapine 20mg HS for psychosis  Risperdal 2mg HS for psychosis  Taper Clozapine to 50mg daily for one week, then discontinue due to patient request  Weekly CBC with differential for REMS program until discontinuation  Most recent [place] = 11 (in the setting of patient illness) on 11/20, prior Reed on 11/15=6.5  Continue daily vitamin D 400 IU for vitamin D deficiency  Weekly labs reviewed  MRI ordered, patient does not desire to complete MRI at this time  On waiting list for individual psychotherapy  Follow up in 4 weeks for medication management  Follow up with PCP for medical issues and ongoing care  Aware of 24 hour and weekend coverage for urgent situations accessed by calling [address] [facility] main practice number    Individual psychotherapy provided: No     Treatment Plan:     Completed and signed during the session: Not applicable - Treatment Plan not due at this session    Medications Risks/Benefits:      Risks, Benefits And Possible Side Effects Of Medications:    Risks, benefits, and possible side effects of medications explained to [facility] and he verbalizes understanding and agreement for treatment. Controlled Medication Discussion:     Not applicable    Medical Decision Making / Counseling / Coordination of Care: The following interventions are recommended: return in 1 month for follow up. Although patient's acute lethality risk is LOW, long-term/chronic lethality risk is mildly elevated given the risk factors listed above. However, at the current moment, [facility] is future-oriented, forward-thinking, and demonstrates ability to act in a self-preserving manner as evidenced by volitionally seeking psychiatric evaluation and treatment today. To mitigate future risk, patient should adhere to treatment recommendations, avoid alcohol/illicit substance use, utilize community-based resources and familiar support, and prioritize mental health treatment. The diagnosis and treatment plan were reviewed with the patient. Risks, benefits, and alternatives to treatment were discussed. The importance of medication and treatment compliance was reviewed with the patient.     _____________________________________________    History of Present Illness     Chief Complaint: "I feel terrible"    SUBJECTIVE:    Mario Cloud is a 33 y.o. male, possessing a past psychiatric history significant for schizophrenia, who was personally seen and evaluated at the [facility] outpatient clinic virtually for follow-up regarding medication management. At their last visit, the plan was to initiate clozapine. Per discussion with patient's mother between appointments, he has been heard talking to himself significantly less frequently.  Most recent PCP visit with Dr. Mercedez Lawler was reviewed. VS on 11/9/23 included: Height 5'9", Weight 161 lb, BMI 23.9, blood pressure 120/84, pulse 108. He also began coming down with a virus/flu-like symptoms over the last few days, leading to elevated white blood cell counts on routine blood work, and resulting in this visit being transitioned to virtual.     Duane Clifford states that since their previous psychiatric appointment with this writer, he reported that the medication initially felt like it was helping but he feels terribly today. We discussed that based on the symptoms he is reporting, and his recent lab work, it appears that he is coming down with a virus, which may take several days to recover from. He had a fever yesterday, has not checked today, but feeling chills still. He feels he was physically well yesterday but is worse today. He feels the "medication is tormenting him", he feels numbness in his head, and feels tingling. He denies feeling down, depressed, hopeless, denies suicidal ideation, and denies any hallucinations. He firmly believes that his current symptoms are not due to a virus, but are due to the medication being "too strong."  Discussed that the recommended plan would be to continue clozapine at the current dose and discontinue loxapine, which would decrease the total amount of medication. However, Duane Clifford would like to instead continue on just the current dosages of loxapine and Risperdal and discontinue clozapine. He feels strongly that the current symptoms he is experiencing is "much more serious" than the symptoms he was experiencing before of "just talking to myself" " and feeling like everybody is picking on me." He is unwilling to continue the clozapine anymore as he is attributing these symptoms as being effects of the clozapine. Presently, patient denies suicidal/homicidal ideation in addition to thoughts of self-injury.   At conclusion of evaluation, patient is amenable to the recommendations of this writer including: continue psychotropic medications as prescribed, obtain weekly lab work. Also, patient is amenable to calling/contacting the outpatient office including this writer if any acute adverse effects of their medication regimen arise in addition to any comments or concerns pertaining to their psychiatric management. Patient is amenable to calling/contacting crisis and/or attending to the nearest emergency department if their clinical condition deteriorates to assure their safety and stability, stating that they are able to appropriately confide in their mother regarding their psychiatric state. Current Rating Scores:     None completed today. Psychiatric Review Of Systems:    Appetite: no  Adverse eating: no  Weight changes: no  Insomnia/sleeplessness: no  Fatigue/anergy: low  Anhedonia/lack of interest: no  Attention/concentration: decreased  Psychomotor agitation/retardation: yes  Somatic symptoms: no  Anxiety/panic attack: denies  Mylene/hypomania: no  Hopelessness/helplessness/worthlessness: no  Self-injurious behavior/high-risk behavior: no  Suicidal ideation: no  Homicidal ideation: no  Auditory hallucinations: yes  Visual hallucinations: no  Other perceptual disturbances: no  Delusional thinking: no  Obsessive/compulsive symptoms: no    Review Of Systems:      Constitutional fever, chills, and low energy   ENT negative   Cardiovascular negative   Respiratory negative   Gastrointestinal negative   Genitourinary negative   Musculoskeletal negative   Integumentary negative   Neurological headache with light sensitivity, 'numbness'   Endocrine negative   Other Symptoms none, all other systems are negative     Objective    OBJECTIVE:     Visit Vitals  Smoking Status Never      Wt Readings from Last 6 Encounters:   No data found for Wt        No past medical history on file. No past surgical history on file.     Meds/Allergies    Allergies   Allergen Reactions    Gantrisin [Sulfisoxazole] Hallucinations     Took as a child and had hallucinations per mother     Current Outpatient Medications   Medication Instructions    b complex vitamins capsule 1 capsule, Oral, Daily    clonazePAM (KLONOPIN) 0.5 mg, Oral, Once    cloZAPine (CLOZARIL) 25 mg tablet Take 1 tablet (25 mg total) by mouth daily for 7 days, THEN 2 tablets (50 mg total) daily for 7 days, THEN 3 tablets (75 mg total) daily for 7 days, THEN 4 tablets (100 mg total) daily for 9 days.     loxapine (LOXITANE) 20 mg, Oral, Daily at bedtime    risperiDONE (RISPERDAL) 2 mg, Oral, Daily at bedtime    Vitamin D (Cholecalciferol) 400 Units, Oral, Daily           Mental Status Exam:    Appearance age appropriate, casually dressed   Behavior calm   Speech normal volume, normal pitch   Mood "Not good"   Affect constricted   Thought Processes organized, goal directed   Associations intact associations   Thought Content no overt delusions   Perceptual Disturbances: Denies auditory or visual hallucinations and Does not appear to be responding to internal stimuli   Abnormal Thoughts  Risk Potential Denies suicidal or homicidal ideation, plan, or intent   Orientation oriented to person, place, time/date, and situation   Memory recent and remote memory grossly intact   Consciousness alert and awake   Attention Span Concentration Span attention span and concentration are age appropriate   Intellect appears to be of average intelligence   Insight fair   Judgement fair   Muscle Strength and  Gait unable to assess today due to virtual visit   Motor Activity unable to assess today due to virtual visit   Language no difficulty naming common objects, no difficulty repeating a phrase   Fund of Knowledge adequate knowledge of current events  adequate fund of knowledge regarding past history  adequate fund of knowledge regarding vocabulary      Laboratory Results: I have personally reviewed all pertinent laboratory/tests results    Ancillary Orders on 11/10/2023 Component Date Value Ref Range Status    White Blood Cell Count 11/15/2023 9.4  3.4 - 10.8 x10E3/uL Final    Red Blood Cell Count 11/15/2023 4.49  4.14 - 5.80 x10E6/uL Final    Hemoglobin 11/15/2023 13.6  13.0 - 17.7 g/dL Final    HCT 11/15/2023 38.8  37.5 - 51.0 % Final    MCV 11/15/2023 86  79 - 97 fL Final    MCH 11/15/2023 30.3  26.6 - 33.0 pg Final    MCHC 11/15/2023 35.1  31.5 - 35.7 g/dL Final    RDW 11/15/2023 12.5  11.6 - 15.4 % Final    Platelet Count 27/41/4203 283  150 - 450 x10E3/uL Final    Neutrophils 11/15/2023 68  Not Estab. % Final    Lymphocytes 11/15/2023 16  Not Estab. % Final    Monocytes 11/15/2023 10  Not Estab. % Final    Eosinophils 11/15/2023 4  Not Estab. % Final    Basophils PCT 11/15/2023 1  Not Estab. % Final    Neutrophils (Absolute) 11/15/2023 6.5  1.4 - 7.0 x10E3/uL Final    Lymphocytes (Absolute) 11/15/2023 1.5  0.7 - 3.1 x10E3/uL Final    Monocytes (Absolute) 11/15/2023 0.9  0.1 - 0.9 x10E3/uL Final    Eosinophils (Absolute) 11/15/2023 0.4  0.0 - 0.4 x10E3/uL Final    Basophils ABS 11/15/2023 0.1  0.0 - 0.2 x10E3/uL Final    Immature Granulocytes 11/15/2023 1  Not Estab. % Final    Immature Granulocytes (Absolute) 11/15/2023 0.1  0.0 - 0.1 x10E3/uL Final   Orders Only on 11/08/2023   Component Date Value Ref Range Status    White Blood Cell Count 11/08/2023 9.4  3.4 - 10.8 x10E3/uL Final    Red Blood Cell Count 11/08/2023 4.85  4.14 - 5.80 x10E6/uL Final    Hemoglobin 11/08/2023 14.1  13.0 - 17.7 g/dL Final    HCT 11/08/2023 42.2  37.5 - 51.0 % Final    MCV 11/08/2023 87  79 - 97 fL Final    MCH 11/08/2023 29.1  26.6 - 33.0 pg Final    MCHC 11/08/2023 33.4  31.5 - 35.7 g/dL Final    RDW 11/08/2023 12.2  11.6 - 15.4 % Final    Platelet Count 76/40/8325 318  150 - 450 x10E3/uL Final    Neutrophils 11/08/2023 64  Not Estab. % Final    Lymphocytes 11/08/2023 19  Not Estab. % Final    Monocytes 11/08/2023 11  Not Estab. % Final    Eosinophils 11/08/2023 4  Not Estab. % Final Basophils PCT 11/08/2023 1  Not Estab. % Final    Neutrophils (Absolute) 11/08/2023 6.1  1.4 - 7.0 x10E3/uL Final    Lymphocytes (Absolute) 11/08/2023 1.8  0.7 - 3.1 x10E3/uL Final    Monocytes (Absolute) 11/08/2023 1.0 (H)  0.1 - 0.9 x10E3/uL Final    Eosinophils (Absolute) 11/08/2023 0.4  0.0 - 0.4 x10E3/uL Final    Basophils ABS 11/08/2023 0.1  0.0 - 0.2 x10E3/uL Final    Immature Granulocytes 11/08/2023 1  Not Estab. % Final    Immature Granulocytes (Absolute) 11/08/2023 0.1  0.0 - 0.1 x10E3/uL Final   Orders Only on 11/01/2023   Component Date Value Ref Range Status    White Blood Cell Count 11/01/2023 6.7  3.4 - 10.8 x10E3/uL Final    Red Blood Cell Count 11/01/2023 4.64  4.14 - 5.80 x10E6/uL Final    Hemoglobin 11/01/2023 13.7  13.0 - 17.7 g/dL Final    HCT 11/01/2023 40.6  37.5 - 51.0 % Final    MCV 11/01/2023 88  79 - 97 fL Final    MCH 11/01/2023 29.5  26.6 - 33.0 pg Final    MCHC 11/01/2023 33.7  31.5 - 35.7 g/dL Final    RDW 11/01/2023 12.0  11.6 - 15.4 % Final    Platelet Count 33/72/7218 297  150 - 450 x10E3/uL Final    Neutrophils 11/01/2023 65  Not Estab. % Final    Lymphocytes 11/01/2023 21  Not Estab. % Final    Monocytes 11/01/2023 9  Not Estab. % Final    Eosinophils 11/01/2023 4  Not Estab. % Final    Basophils PCT 11/01/2023 1  Not Estab. % Final    Neutrophils (Absolute) 11/01/2023 4.4  1.4 - 7.0 x10E3/uL Final    Lymphocytes (Absolute) 11/01/2023 1.4  0.7 - 3.1 x10E3/uL Final    Monocytes (Absolute) 11/01/2023 0.6  0.1 - 0.9 x10E3/uL Final    Eosinophils (Absolute) 11/01/2023 0.2  0.0 - 0.4 x10E3/uL Final    Basophils ABS 11/01/2023 0.1  0.0 - 0.2 x10E3/uL Final    Immature Granulocytes 11/01/2023 0  Not Estab. % Final    Immature Granulocytes (Absolute) 11/01/2023 0.0  0.0 - 0.1 x10E3/uL Final   Ancillary Orders on 10/27/2023   Component Date Value Ref Range Status    White Blood Cell Count 11/20/2023 14.3 (H)  3.4 - 10.8 x10E3/uL Final    Red Blood Cell Count 11/20/2023 4.82  4.14 - 5.80 x10E6/uL Final    Hemoglobin 11/20/2023 14.3  13.0 - 17.7 g/dL Final    HCT 11/20/2023 41.3  37.5 - 51.0 % Final    MCV 11/20/2023 86  79 - 97 fL Final    MCH 11/20/2023 29.7  26.6 - 33.0 pg Final    MCHC 11/20/2023 34.6  31.5 - 35.7 g/dL Final    RDW 11/20/2023 12.4  11.6 - 15.4 % Final    Platelet Count 05/11/1171 281  150 - 450 x10E3/uL Final    Neutrophils 11/20/2023 77  Not Estab. % Final    Lymphocytes 11/20/2023 11  Not Estab. % Final    Monocytes 11/20/2023 11  Not Estab. % Final    Eosinophils 11/20/2023 1  Not Estab. % Final    Basophils PCT 11/20/2023 0  Not Estab. % Final    Neutrophils (Absolute) 11/20/2023 11.0 (H)  1.4 - 7.0 x10E3/uL Final    Lymphocytes (Absolute) 11/20/2023 1.6  0.7 - 3.1 x10E3/uL Final    Monocytes (Absolute) 11/20/2023 1.5 (H)  0.1 - 0.9 x10E3/uL Final    Eosinophils (Absolute) 11/20/2023 0.1  0.0 - 0.4 x10E3/uL Final    Basophils ABS 11/20/2023 0.1  0.0 - 0.2 x10E3/uL Final    Immature Granulocytes 11/20/2023 0  Not Estab. % Final    Immature Granulocytes (Absolute) 11/20/2023 0.0  0.0 - 0.1 x10E3/uL Final   Office Visit on 10/25/2023   Component Date Value Ref Range Status    White Blood Cell Count 10/26/2023 5.8  3.4 - 10.8 x10E3/uL Final    Red Blood Cell Count 10/26/2023 4.45  4.14 - 5.80 x10E6/uL Final    Hemoglobin 10/26/2023 13.3  13.0 - 17.7 g/dL Final    HCT 10/26/2023 38.8  37.5 - 51.0 % Final    MCV 10/26/2023 87  79 - 97 fL Final    MCH 10/26/2023 29.9  26.6 - 33.0 pg Final    MCHC 10/26/2023 34.3  31.5 - 35.7 g/dL Final    RDW 10/26/2023 12.2  11.6 - 15.4 % Final    Platelet Count 15/95/2873 256  150 - 450 x10E3/uL Final    Neutrophils 10/26/2023 62  Not Estab. % Final    Lymphocytes 10/26/2023 24  Not Estab. % Final    Monocytes 10/26/2023 9  Not Estab. % Final    Eosinophils 10/26/2023 4  Not Estab. % Final    Basophils PCT 10/26/2023 1  Not Estab. % Final    Neutrophils (Absolute) 10/26/2023 3.6  1.4 - 7.0 x10E3/uL Final    Lymphocytes (Absolute) 10/26/2023 1.4 0.7 - 3.1 x10E3/uL Final    Monocytes (Absolute) 10/26/2023 0.5  0.1 - 0.9 x10E3/uL Final    Eosinophils (Absolute) 10/26/2023 0.2  0.0 - 0.4 x10E3/uL Final    Basophils ABS 10/26/2023 0.1  0.0 - 0.2 x10E3/uL Final    Immature Granulocytes 10/26/2023 0  Not Estab. % Final    Immature Granulocytes (Absolute) 10/26/2023 0.0  0.0 - 0.1 x10E3/uL Final   Office Visit on 08/16/2023   Component Date Value Ref Range Status    25-HYDROXY VIT D 10/16/2023 38.1  30.0 - 100.0 ng/mL Final    Comment: Vitamin D deficiency has been defined by the [address] [facility] practice guideline as a  level of serum 25-OH vitamin D less than 20 ng/mL (1,2). The Endocrine Society went on to further define vitamin D  insufficiency as a level between 21 and 29 ng/mL (2). 1. IOM ([place] of Medicine). 2010. Dietary reference     intakes for calcium and D. [place]: The     [employer]. 2. Iwona MF, Yaritza NC, Chandan HERNANDEZ, et al.     Evaluation, treatment, and prevention of vitamin D     deficiency: an Endocrine Society clinical practice     guideline. JCEM. 2011 Jul; 96(7):1911-30.      Orders Only on 08/03/2023   Component Date Value Ref Range Status    Prolactin 08/10/2023 46.2 (H)  4.0 - 15.2 ng/mL Final   Orders Only on 07/31/2023   Component Date Value Ref Range Status    White Blood Cell Count 07/31/2023 4.9  3.4 - 10.8 x10E3/uL Final    Red Blood Cell Count 07/31/2023 4.87  4.14 - 5.80 x10E6/uL Final    Hemoglobin 07/31/2023 14.4  13.0 - 17.7 g/dL Final    HCT 07/31/2023 42.7  37.5 - 51.0 % Final    MCV 07/31/2023 88  79 - 97 fL Final    MCH 07/31/2023 29.6  26.6 - 33.0 pg Final    MCHC 07/31/2023 33.7  31.5 - 35.7 g/dL Final    RDW 07/31/2023 12.2  11.6 - 15.4 % Final    Platelet Count 48/32/1177 284  150 - 450 x10E3/uL Final    Neutrophils 07/31/2023 57  Not Estab. % Final    Lymphocytes 07/31/2023 31  Not Estab. % Final    Monocytes 07/31/2023 7  Not Estab. % Final Eosinophils 07/31/2023 4  Not Estab. % Final    Basophils PCT 07/31/2023 1  Not Estab. % Final    Neutrophils (Absolute) 07/31/2023 2.8  1.4 - 7.0 x10E3/uL Final    Lymphocytes (Absolute) 07/31/2023 1.5  0.7 - 3.1 x10E3/uL Final    Monocytes (Absolute) 07/31/2023 0.4  0.1 - 0.9 x10E3/uL Final    Eosinophils (Absolute) 07/31/2023 0.2  0.0 - 0.4 x10E3/uL Final    Basophils ABS 07/31/2023 0.1  0.0 - 0.2 x10E3/uL Final    Immature Granulocytes 07/31/2023 0  Not Estab. % Final    Immature Granulocytes (Absolute) 07/31/2023 0.0  0.0 - 0.1 x10E3/uL Final   Office Visit on 07/26/2023   Component Date Value Ref Range Status    Glucose, Random 08/10/2023 93  70 - 99 mg/dL Final    BUN 08/10/2023 11  6 - 20 mg/dL Final    Creatinine 08/10/2023 0.97  0.76 - 1.27 mg/dL Final    eGFR 08/10/2023 110  >59 mL/min/1.73 Final    SL AMB BUN/CREATININE RATIO 08/10/2023 11  9 - 20 Final    Sodium 08/10/2023 139  134 - 144 mmol/L Final    Potassium 08/10/2023 4.2  3.5 - 5.2 mmol/L Final    Chloride 08/10/2023 100  96 - 106 mmol/L Final    CO2 08/10/2023 24  20 - 29 mmol/L Final    CALCIUM 08/10/2023 9.4  8.7 - 10.2 mg/dL Final    Protein, Total 08/10/2023 7.1  6.0 - 8.5 g/dL Final    Albumin 08/10/2023 5.0  4.3 - 5.2 g/dL Final    Globulin, Total 08/10/2023 2.1  1.5 - 4.5 g/dL Final    Albumin/Globulin Ratio 08/10/2023 2.4 (H)  1.2 - 2.2 Final    TOTAL BILIRUBIN 08/10/2023 0.6  0.0 - 1.2 mg/dL Final    Alk Phos Isoenzymes 08/10/2023 76  44 - 121 IU/L Final    AST 08/10/2023 26  0 - 40 IU/L Final    ALT 08/10/2023 25  0 - 44 IU/L Final    TSH 08/10/2023 2.730  0.450 - 4.500 uIU/mL Final    Cholesterol, Total 08/10/2023 156  100 - 199 mg/dL Final    Triglycerides 08/10/2023 85  0 - 149 mg/dL Final    HDL 08/10/2023 44  >39 mg/dL Final    VLDL Cholesterol Calculated 08/10/2023 16  5 - 40 mg/dL Final    LDL Calculated 08/10/2023 96  0 - 99 mg/dL Final    LDl/HDL Ratio 08/10/2023 2.2  0.0 - 3.6 ratio Final    Comment: LDL/HDL Ratio                                              Men  Women                                1/2 Avg. Risk  1.0    1.5                                    Avg.Risk  3.6    3.2                                 2X Avg. Risk  6.2    5.0                                 3X Avg. Risk  8.0    6.1      Hemoglobin A1C 08/10/2023 5.2  4.8 - 5.6 % Final    Comment:          Prediabetes: 5.7 - 6.4           Diabetes: >6.4           Glycemic control for adults with diabetes: <7.0      Estimated Average Glucose 08/10/2023 103  mg/dL Final    25-HYDROXY VIT D 08/10/2023 25.9 (L)  30.0 - 100.0 ng/mL Final    Comment: Vitamin D deficiency has been defined by the [address] [facility] practice guideline as a  level of serum 25-OH vitamin D less than 20 ng/mL (1,2). The Endocrine Society went on to further define vitamin D  insufficiency as a level between 21 and 29 ng/mL (2). 1. IOM ([place] of Medicine). 2010. Dietary reference     intakes for calcium and D. [place]: The     [employer]. 2. Iwona MF, Yaritza STERN, Chandan HERNANDEZ, et al.     Evaluation, treatment, and prevention of vitamin D     deficiency: an Endocrine Society clinical practice     guideline. JCEM. 2011 Jul; 96(7):1911-30.               ___________________________________    History Review: The following portions of the patient's history were reviewed and updated as appropriate: allergies, current medications, past family history, past medical history, past social history, past surgical history, and problem list.    Unchanged information from this writer's previous assessment is copied and italicized; information that has changed is bolded.     Past Psychiatric History:      Past psychiatric diagnoses:   Schizophrenia, schizoaffective d/o, anxiety, depression   Inpatient psychiatric admissions:   10 times total (4 were involuntary)  First - age 25 after stealing sisters klonopin  Most common reason: first few were "no reason"  2017 - got invega shot was a very influential moment  2 suicide attempts/SI: SI and wanting to jump off a roof (2021) a few months later after sleeping pills, tried to hang himself 2 years ago  Psychosis 3 times (most recent for that reason was 3 years)  Most recent was 2 years ago  Has stayed out of the hospital due to "taking his medications, not having suicidal ideation"  Denies history of ECT, denies having had ICM in past.   Prior outpatient psychiatric treatment:   Saw [facility] for 3 years  Saw psychiatrist at [facility] for a year before that  Past/current psychotherapy:   Worked with a therapist at [employer] [facility] [employer], stopped seeing 1-2 years ago because he left the practice  History of suicidal attempts/gestures:   2: took sleeping pills (August 2021), tried to hang self two years  History of non-suicidal self-injurious behavior:   None  History of violence/aggressive behaviors:   None  Psychotropic medication trials:   Invega, (discomfort) risperdal, zyprexa, thorazine (all unknown reasons for discontinuation)  Abilify (bad reaction)  Invega SCHAEFER x1 in 2019 ("hurt his head")  Ativan (caused psychosis)  Loxapine - feeling too tired on dosages above 20mg  Risperdal - above 2mg causes nausea and "hurting" in his head  Clozapine - at 100mg felt "hurt my head" and tingling in body  Substance abuse inpatient/outpatient rehabilitation:   Rehab in 2017 - marijuana use  Eating disorder history:   No  Other services: used to have a  but reports he does not anymore because they ran out of things to do together      Substance Abuse History:        States he was smoking a lot of marijuana for 5 years, nearly every day, stopped smoking 2019 July after he "freaked out" and thought "there were people in his basement and living in his house and torturing him. "     Denies any other recreational drug use and otherwise denies history of alcohol, illict substance, or tobacco abuse., Patient denies previous legal actions or arrests related to substance intoxication including prior DWIs/DUIs., Patient does not exhibit objective evidence of substance withdrawal during today's examination nor do they appear under the influence of any psychoactive substance. Family Psychiatric History:      Family History   History reviewed. No pertinent family history. Psychiatric Family History: Per patient he is adopted,  Per mother is not adopted. Maternal grandfather - schizophrenia  Tex's half sister has bipolar disorder     Social History:     Developmental: Was in special education, denies IEP  Per care everywhere, mild developmental delay, early intervention at age 2.5. Concern for autism, diagnosed with ADHD. Has 2 sister (54 and 29years old), states he was adopted but his mother reports he is not adopted  Education: high school diploma/GED  Marital history: single  Children: none  Living arrangement, social support: Family is supportive, mom is supportive. Lives with mother, on a waiting list for housing, on disability for schizoaffective disorder. Dad lives in [facility]   Occupational History: Permanent disability. 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 , Patient denies history of arrests or violence with a deadly weapon.     history: None     Traumatic History:      Abuse: Denies  Other Traumatic Events: Bullying, and would not disclose further  ___________________________________    This note was not shared with the patient due to reasonable likelihood of causing patient harm      Visit Time    Visit Start Time: 9:59  Visit Stop Time: 10:30  Total Visit Duration:  31 minutes    Veronica Juan MD   11/22/23

## 2023-11-24 ENCOUNTER — TELEPHONE (OUTPATIENT)
Dept: PSYCHIATRY | Facility: CLINIC | Age: 28
End: 2023-11-24

## 2023-11-24 DIAGNOSIS — F20.9 SCHIZOPHRENIA, UNSPECIFIED TYPE (HCC): ICD-10-CM

## 2023-11-24 RX ORDER — CLOZAPINE 25 MG/1
75 TABLET ORAL DAILY
Qty: 90 TABLET | Refills: 0 | Status: SHIPPED | OUTPATIENT
Start: 2023-11-24 | End: 2023-11-27 | Stop reason: SDUPTHER

## 2023-11-24 NOTE — TELEPHONE ENCOUNTER
Mom called, said that Donaldo Truong would like to stay on the clozapine 75mg, would like a refill of it so he can continue to take it

## 2023-11-24 NOTE — TELEPHONE ENCOUNTER
Covering in Dr. Suyapa barrera Will send refill of 75 mg Clozaril to his preferred pharmacy. He is recommended to continue to obtain his weekly CBC w/differentials and per chart review, it has already been ordered.

## 2023-11-27 ENCOUNTER — TELEPHONE (OUTPATIENT)
Dept: PSYCHIATRY | Facility: CLINIC | Age: 28
End: 2023-11-27

## 2023-11-27 DIAGNOSIS — F20.9 SCHIZOPHRENIA, UNSPECIFIED TYPE (HCC): ICD-10-CM

## 2023-11-27 DIAGNOSIS — K59.03 DRUG-INDUCED CONSTIPATION: Primary | ICD-10-CM

## 2023-11-27 RX ORDER — CLOZAPINE 100 MG/1
100 TABLET ORAL DAILY
Qty: 30 TABLET | Refills: 0 | Status: SHIPPED | OUTPATIENT
Start: 2023-11-27 | End: 2023-12-27

## 2023-11-27 RX ORDER — SENNOSIDES 8.6 MG
8.6 TABLET ORAL
Qty: 30 TABLET | Refills: 1 | Status: SHIPPED | OUTPATIENT
Start: 2023-11-27

## 2023-11-27 RX ORDER — CLOZAPINE 25 MG/1
75 TABLET ORAL DAILY
Qty: 90 TABLET | Refills: 0 | Status: SHIPPED | OUTPATIENT
Start: 2023-11-27 | End: 2023-11-27 | Stop reason: SDUPTHER

## 2023-11-27 NOTE — TELEPHONE ENCOUNTER
Called and spoke with Tex's mother, she wanted to discuss the dosage change to 75mg, to ensure this writer is aware of this change. She also reported that over the last several months he has gained approximately 15 pounds. She also notified this writer that Tex's abdomen appears distended. Asked her to discuss his bowel movements with him when she gets home today and to call back if he is not having bowel movements. She will discuss with him this morning. He obtained CBC this morning for [facility] program.    After our phone call, this writer also called Tex's cell phone, he answered the phone. He reported that he would be willing to take 75 or 100 mg of the clozapine, because he wants the medication to work as well as possible, and that he felt better as soon as we hung up the phone during our last appointment last week. This writer had originally resent the 75 mg prescription to [facility], but resent 100 mg as this is a more therapeutic dosage for this medication and had been noticing benefit at that dosage. He reports he is having bowel movements 2-3 times per day but these bowel movements are sometimes hard and he has to strain. He was agreeable to try senna for stool softening to make bowel movements more regular. Will reach out to staff to schedule close follow-up for 2 weeks after his last appointment.

## 2023-11-27 NOTE — TELEPHONE ENCOUNTER
Mom also left a message on the script line, asked that Tex's refills be sent to [facility], [address] [place]

## 2023-11-27 NOTE — TELEPHONE ENCOUNTER
Mom, YOCASTA Lora requesting a call from Dr. Romi Miles; did not specify exactly what she would like to discuss. Please return call at [facility].

## 2023-11-28 LAB
BASOPHILS # BLD AUTO: 0.1 X10E3/UL (ref 0–0.2)
BASOPHILS NFR BLD AUTO: 1 %
EOSINOPHIL # BLD AUTO: 0.2 X10E3/UL (ref 0–0.4)
EOSINOPHIL NFR BLD AUTO: 3 %
ERYTHROCYTE [DISTWIDTH] IN BLOOD BY AUTOMATED COUNT: 12.4 % (ref 11.6–15.4)
HCT VFR BLD AUTO: 39.2 % (ref 37.5–51)
HGB BLD-MCNC: 13.2 G/DL (ref 13–17.7)
IMM GRANULOCYTES # BLD: 0 X10E3/UL (ref 0–0.1)
IMM GRANULOCYTES NFR BLD: 0 %
LYMPHOCYTES # BLD AUTO: 1.4 X10E3/UL (ref 0.7–3.1)
LYMPHOCYTES NFR BLD AUTO: 20 %
MCH RBC QN AUTO: 29.1 PG (ref 26.6–33)
MCHC RBC AUTO-ENTMCNC: 33.7 G/DL (ref 31.5–35.7)
MCV RBC AUTO: 86 FL (ref 79–97)
MONOCYTES # BLD AUTO: 0.4 X10E3/UL (ref 0.1–0.9)
MONOCYTES NFR BLD AUTO: 6 %
NEUTROPHILS # BLD AUTO: 4.6 X10E3/UL (ref 1.4–7)
NEUTROPHILS NFR BLD AUTO: 70 %
PLATELET # BLD AUTO: 330 X10E3/UL (ref 150–450)
RBC # BLD AUTO: 4.54 X10E6/UL (ref 4.14–5.8)
WBC # BLD AUTO: 6.7 X10E3/UL (ref 3.4–10.8)

## 2023-12-04 ENCOUNTER — TELEPHONE (OUTPATIENT)
Dept: PSYCHIATRY | Facility: CLINIC | Age: 28
End: 2023-12-04

## 2023-12-04 NOTE — TELEPHONE ENCOUNTER
Left voicemail informing patient of the Virtual Psych Encounter form needing to be signed as a requirement from [employer] for billing purposes. Patient can access form via [employer] and sign electronically. Please make patient aware this form must be signed for each virtual visit as a requirement to continue virtual visits with provider.

## 2023-12-05 LAB
BASOPHILS # BLD AUTO: 0.1 X10E3/UL (ref 0–0.2)
BASOPHILS NFR BLD AUTO: 1 %
EOSINOPHIL # BLD AUTO: 0.2 X10E3/UL (ref 0–0.4)
EOSINOPHIL NFR BLD AUTO: 3 %
ERYTHROCYTE [DISTWIDTH] IN BLOOD BY AUTOMATED COUNT: 12.7 % (ref 11.6–15.4)
HCT VFR BLD AUTO: 41.2 % (ref 37.5–51)
HGB BLD-MCNC: 13.6 G/DL (ref 13–17.7)
IMM GRANULOCYTES # BLD: 0 X10E3/UL (ref 0–0.1)
IMM GRANULOCYTES NFR BLD: 0 %
LYMPHOCYTES # BLD AUTO: 1.8 X10E3/UL (ref 0.7–3.1)
LYMPHOCYTES NFR BLD AUTO: 28 %
MCH RBC QN AUTO: 29.1 PG (ref 26.6–33)
MCHC RBC AUTO-ENTMCNC: 33 G/DL (ref 31.5–35.7)
MCV RBC AUTO: 88 FL (ref 79–97)
MONOCYTES # BLD AUTO: 0.6 X10E3/UL (ref 0.1–0.9)
MONOCYTES NFR BLD AUTO: 9 %
NEUTROPHILS # BLD AUTO: 3.8 X10E3/UL (ref 1.4–7)
NEUTROPHILS NFR BLD AUTO: 59 %
PLATELET # BLD AUTO: 318 X10E3/UL (ref 150–450)
RBC # BLD AUTO: 4.67 X10E6/UL (ref 4.14–5.8)
WBC # BLD AUTO: 6.5 X10E3/UL (ref 3.4–10.8)

## 2023-12-06 ENCOUNTER — OFFICE VISIT (OUTPATIENT)
Dept: PSYCHIATRY | Facility: CLINIC | Age: 28
End: 2023-12-06
Payer: COMMERCIAL

## 2023-12-06 DIAGNOSIS — K59.03 DRUG-INDUCED CONSTIPATION: ICD-10-CM

## 2023-12-06 DIAGNOSIS — F51.8 DISRUPTED SLEEP-WAKE CYCLE: ICD-10-CM

## 2023-12-06 DIAGNOSIS — F20.9 SCHIZOPHRENIA, UNSPECIFIED TYPE (HCC): Primary | ICD-10-CM

## 2023-12-06 DIAGNOSIS — E55.9 VITAMIN D DEFICIENCY: ICD-10-CM

## 2023-12-06 DIAGNOSIS — Z51.81 ENCOUNTER FOR MEDICATION MONITORING: ICD-10-CM

## 2023-12-06 DIAGNOSIS — G47.20 DISRUPTED SLEEP-WAKE CYCLE: ICD-10-CM

## 2023-12-06 PROBLEM — Z15.89 HOMOZYGOUS MTHFR MUTATION C677T: Status: ACTIVE | Noted: 2023-12-06

## 2023-12-06 PROCEDURE — 99214 OFFICE O/P EST MOD 30 MIN: CPT | Performed by: STUDENT IN AN ORGANIZED HEALTH CARE EDUCATION/TRAINING PROGRAM

## 2023-12-06 RX ORDER — UREA 10 %
1 LOTION (ML) TOPICAL
Qty: 30 TABLET | Refills: 2 | Status: SHIPPED | OUTPATIENT
Start: 2023-12-06

## 2023-12-06 NOTE — PSYCH
MEDICATION MANAGEMENT NOTE      [address]: [address]   [place]  [address] [place] [address]   [phone]    Name and Date of Birth:  Cherri George 33 y.o. 1995 MRN: [id number]    Date of Visit: December 6, 2023    Reason for Visit: Follow-up visit regarding medication management     _____________________________    Assessment/Plan   Cherri George is a 33 y.o. male, Single, domiciled with mother, on permanent disability, with past medical history of Vitamin D deficiency, prior psychiatric diagnoses of schizoaffective disorder; multiple hospitalizations and two suicide attempts (most recently 2021). Matthieu Hodge was personally seen and evaluated today at the [address] [place] [address] outpatient clinic for follow-up regarding medication management. On assessment, Cherri George has noticed significant improvements in his daily functioning on clozapine. He has not been talking to himself, has been less angry and less stressed, has been able to be less restless, is playing video games again. He feels these are positive changes. Though he sometimes feels distracted with the use of the medications, we discussed that reducing his regimen overall may help with that sensation and allow him to think more clearly. We also discussed that he can try taking clozapine at bedtime instead of during the day time, which may help with that feeling. He denies side effects with clozapine though had reported some constipation over the phone which has resolved with senna. He is requesting melatonin for sleep-wake cycle regulation. He is in agreement with plan to reduce polypharmacy over time. Will start by discontinuing loxapine at this time. During our next appointment we will reassess his progress and dosage of clozapine, and consider decreasing risperdal at that time. For now he will continue risperdal and clozapine. DSM-5 Diagnoses:     1. Schizophrenia, unspecified type ([address])    2. Drug-induced constipation    3. Vitamin D deficiency    4. Disrupted sleep-wake cycle    5. Encounter for medication monitoring        Treatment Recommendations/Precautions:  Continue psychopharmacological management as follows:  Discontinue loxapine to reduce polypharmacy  Risperdal 2mg HS for psychosis  Clozapine 100mg HS (patient taking daily) for psychosis  Continue weekly CBC with differential for REMS program  Most recent Nichliyahsberg = 3.8 on 12/4  Obtain clozapine level on blood draw next week for monitoring  Continue daily vitamin D supplementation for deficiency  Continue daily sennakot for clozapine induced constipation   Melatonin 1mg HS PRN for sleep-wake cycle regulation  Labs most recently obtained 12/4, reviewed. On waiting list for psychotherapy  Follow up in 1 month for medication management  Follow up with PCP for medical issues and ongoing care  Aware of 24 hour and weekend coverage for urgent situations accessed by calling [facility] main practice number    Individual psychotherapy provided: No     Treatment Plan:     Completed and signed during the session: Not applicable - Treatment Plan not due at this session    Medications Risks/Benefits:      Risks, Benefits And Possible Side Effects Of Medications:    Risks, benefits, and possible side effects of medications explained to Daniella and he verbalizes understanding and agreement for treatment. Controlled Medication Discussion:     Not applicable    Medical Decision Making / Counseling / Coordination of Care: The following interventions are recommended: return in 1 month for follow up. Although patient's acute lethality risk is LOW, long-term/chronic lethality risk is mildly elevated given the risk factors listed above.  However, at the current moment, Daniella is future-oriented, forward-thinking, and demonstrates ability to act in a self-preserving manner as evidenced by volitionally seeking psychiatric evaluation and treatment today. To mitigate future risk, patient should adhere to treatment recommendations, avoid alcohol/illicit substance use, utilize community-based resources and familiar support, and prioritize mental health treatment. The diagnosis and treatment plan were reviewed with the patient. Risks, benefits, and alternatives to treatment were discussed. The importance of medication and treatment compliance was reviewed with the patient.     _____________________________________________    History of Present Illness     Chief Complaint: "I'm not talking to myself anymore"    SUBJECTIVE:    Beltran Sexton is a 33 y.o. male, possessing a past psychiatric history significant for schizophrenia, who was personally seen and evaluated at the [address] [place] [address] outpatient clinic for follow-up regarding medication management. At their last visit, the plan was to taper clozapine per patient request, however based on phone conversations Faith Rosangela elected to resume clozapine 100mg. Faith Adames states that since their previous psychiatric appointment with this writer, he has been doing well and not talking to himself. He is trying to sleep more (go to bed later) and is requesting melatonin to regulate his sleep cycle to allow him to sleep more deeply and during the hours he would prefer to sleep. He states the clozapine helps him stay awake and takes this at 8:00AM, has been taking loxapine and Risperdal at around [facility]. He denies side effects with his medications, has been taking senna and constipation is resolved. Feels "less angry, less stress, less dramatic, not talking to myself." Not thinking as much about the bullying as well, which is a positive change. He sometimes weighs himself at home but has not for a few months. Is noticing and increase in appetite.   We discussed trying to increase physical activity which can help with energy levels and help to mitigate weight gain. In his free time he has been watching [employer], watching streaming, playing video games, going out to eat. Presently, patient denies suicidal/homicidal ideation in addition to thoughts of self-injury. At conclusion of evaluation, patient is amenable to the recommendations of this writer including: continue psychotropic medications as prescribed. Also, patient is amenable to calling/contacting the outpatient office including this writer if any acute adverse effects of their medication regimen arise in addition to any comments or concerns pertaining to their psychiatric management. Patient is amenable to calling/contacting crisis and/or attending to the nearest emergency department if their clinical condition deteriorates to assure their safety and stability, stating that they are able to appropriately confide in their mother regarding their psychiatric state. Current Rating Scores:     None completed today. Psychiatric Review Of Systems:  Unchanged information from this writer's previous assessment is copied and italicized; information that has changed is bolded.     Appetite: no  Adverse eating: no  Weight changes: no  Insomnia/sleeplessness: no  Fatigue/anergy: low  Anhedonia/lack of interest: improving - is now playing video games which was a goal of his  Attention/concentration: improving  Psychomotor agitation/retardation: no  Somatic symptoms: no  Anxiety/panic attack: denies  Mylene/hypomania: no  Hopelessness/helplessness/worthlessness: no  Self-injurious behavior/high-risk behavior: no  Suicidal ideation: no  Homicidal ideation: no  Auditory hallucinations: denies  Visual hallucinations: no  Other perceptual disturbances: no  Delusional thinking: no  Obsessive/compulsive symptoms: no    Review Of Systems:      Constitutional negative   ENT negative   Cardiovascular negative   Respiratory negative   Gastrointestinal negative   Genitourinary negative   Musculoskeletal negative Integumentary negative   Neurological negative   Endocrine negative   Other Symptoms none, all other systems are negative     Objective    OBJECTIVE:     Visit Vitals  Smoking Status Never      Wt Readings from Last 6 Encounters:   No data found for Wt        History reviewed. No pertinent past medical history. History reviewed. No pertinent surgical history.     Meds/Allergies    Allergies   Allergen Reactions    Gantrisin [Sulfisoxazole] Hallucinations     Took as a child and had hallucinations per mother     Current Outpatient Medications   Medication Instructions    b complex vitamins capsule 1 capsule, Oral, Daily    clonazePAM (KLONOPIN) 0.5 mg, Oral, Once    cloZAPine (CLOZARIL) 100 mg, Oral, Daily    melatonin 1 mg, Oral, Daily at bedtime PRN    risperiDONE (RISPERDAL) 2 mg, Oral, Daily at bedtime    senna (SENOKOT) 8.6 mg, Oral, Daily at bedtime    Vitamin D (Cholecalciferol) 400 Units, Oral, Daily           Mental Status Exam:    Appearance age appropriate, casually dressed, wearing winter coat, limited eye contact   Behavior cooperative, calm   Speech normal rate, normal volume, normal pitch   Mood "Good"   Affect constricted   Thought Processes organized, goal directed   Associations intact associations   Thought Content no overt delusions   Perceptual Disturbances: Denies auditory or visual hallucinations and Does not appear to be responding to internal stimuli   Abnormal Thoughts  Risk Potential Denies suicidal or homicidal ideation, plan, or intent   Orientation oriented to person, place, time/date, and situation   Memory recent and remote memory grossly intact   Consciousness alert and awake   Attention Span Concentration Span attention span and concentration are age appropriate   Intellect appears to be of average intelligence   Insight good   Judgement good   Muscle Strength and  Gait normal muscle strength and normal muscle tone, normal gait and normal balance   Motor Activity no abnormal movements   Language no difficulty naming common objects, no difficulty repeating a phrase, no difficulty writing a sentence   Fund of Knowledge adequate knowledge of current events  adequate fund of knowledge regarding past history  adequate fund of knowledge regarding vocabulary      Laboratory Results: I have personally reviewed all pertinent laboratory/tests results    Orders Only on 12/04/2023   Component Date Value Ref Range Status    White Blood Cell Count 12/04/2023 6.5  3.4 - 10.8 x10E3/uL Final    Red Blood Cell Count 12/04/2023 4.67  4.14 - 5.80 x10E6/uL Final    Hemoglobin 12/04/2023 13.6  13.0 - 17.7 g/dL Final    HCT 12/04/2023 41.2  37.5 - 51.0 % Final    MCV 12/04/2023 88  79 - 97 fL Final    MCH 12/04/2023 29.1  26.6 - 33.0 pg Final    MCHC 12/04/2023 33.0  31.5 - 35.7 g/dL Final    RDW 12/04/2023 12.7  11.6 - 15.4 % Final    Platelet Count 41/89/2856 318  150 - 450 x10E3/uL Final    Neutrophils 12/04/2023 59  Not Estab. % Final    Lymphocytes 12/04/2023 28  Not Estab. % Final    Monocytes 12/04/2023 9  Not Estab. % Final    Eosinophils 12/04/2023 3  Not Estab. % Final    Basophils PCT 12/04/2023 1  Not Estab. % Final    Neutrophils (Absolute) 12/04/2023 3.8  1.4 - 7.0 x10E3/uL Final    Lymphocytes (Absolute) 12/04/2023 1.8  0.7 - 3.1 x10E3/uL Final    Monocytes (Absolute) 12/04/2023 0.6  0.1 - 0.9 x10E3/uL Final    Eosinophils (Absolute) 12/04/2023 0.2  0.0 - 0.4 x10E3/uL Final    Basophils ABS 12/04/2023 0.1  0.0 - 0.2 x10E3/uL Final    Immature Granulocytes 12/04/2023 0  Not Estab. % Final    Immature Granulocytes (Absolute) 12/04/2023 0.0  0.0 - 0.1 x10E3/uL Final   Orders Only on 11/27/2023   Component Date Value Ref Range Status    White Blood Cell Count 11/27/2023 6.7  3.4 - 10.8 x10E3/uL Final    Red Blood Cell Count 11/27/2023 4.54  4.14 - 5.80 x10E6/uL Final    Hemoglobin 11/27/2023 13.2  13.0 - 17.7 g/dL Final    HCT 11/27/2023 39.2  37.5 - 51.0 % Final    MCV 11/27/2023 86  79 - 97 fL Final    MCH 11/27/2023 29.1  26.6 - 33.0 pg Final    MCHC 11/27/2023 33.7  31.5 - 35.7 g/dL Final    RDW 11/27/2023 12.4  11.6 - 15.4 % Final    Platelet Count 49/48/1703 330  150 - 450 x10E3/uL Final    Neutrophils 11/27/2023 70  Not Estab. % Final    Lymphocytes 11/27/2023 20  Not Estab. % Final    Monocytes 11/27/2023 6  Not Estab. % Final    Eosinophils 11/27/2023 3  Not Estab. % Final    Basophils PCT 11/27/2023 1  Not Estab. % Final    Neutrophils (Absolute) 11/27/2023 4.6  1.4 - 7.0 x10E3/uL Final    Lymphocytes (Absolute) 11/27/2023 1.4  0.7 - 3.1 x10E3/uL Final    Monocytes (Absolute) 11/27/2023 0.4  0.1 - 0.9 x10E3/uL Final    Eosinophils (Absolute) 11/27/2023 0.2  0.0 - 0.4 x10E3/uL Final    Basophils ABS 11/27/2023 0.1  0.0 - 0.2 x10E3/uL Final    Immature Granulocytes 11/27/2023 0  Not Estab. % Final    Immature Granulocytes (Absolute) 11/27/2023 0.0  0.0 - 0.1 x10E3/uL Final   Ancillary Orders on 11/10/2023   Component Date Value Ref Range Status    White Blood Cell Count 11/15/2023 9.4  3.4 - 10.8 x10E3/uL Final    Red Blood Cell Count 11/15/2023 4.49  4.14 - 5.80 x10E6/uL Final    Hemoglobin 11/15/2023 13.6  13.0 - 17.7 g/dL Final    HCT 11/15/2023 38.8  37.5 - 51.0 % Final    MCV 11/15/2023 86  79 - 97 fL Final    MCH 11/15/2023 30.3  26.6 - 33.0 pg Final    MCHC 11/15/2023 35.1  31.5 - 35.7 g/dL Final    RDW 11/15/2023 12.5  11.6 - 15.4 % Final    Platelet Count 97/40/5988 283  150 - 450 x10E3/uL Final    Neutrophils 11/15/2023 68  Not Estab. % Final    Lymphocytes 11/15/2023 16  Not Estab. % Final    Monocytes 11/15/2023 10  Not Estab. % Final    Eosinophils 11/15/2023 4  Not Estab. % Final    Basophils PCT 11/15/2023 1  Not Estab. % Final    Neutrophils (Absolute) 11/15/2023 6.5  1.4 - 7.0 x10E3/uL Final    Lymphocytes (Absolute) 11/15/2023 1.5  0.7 - 3.1 x10E3/uL Final    Monocytes (Absolute) 11/15/2023 0.9  0.1 - 0.9 x10E3/uL Final    Eosinophils (Absolute) 11/15/2023 0.4  0.0 - 0.4 x10E3/uL Final    Basophils ABS 11/15/2023 0.1  0.0 - 0.2 x10E3/uL Final    Immature Granulocytes 11/15/2023 1  Not Estab. % Final    Immature Granulocytes (Absolute) 11/15/2023 0.1  0.0 - 0.1 x10E3/uL Final   Orders Only on 11/08/2023   Component Date Value Ref Range Status    White Blood Cell Count 11/08/2023 9.4  3.4 - 10.8 x10E3/uL Final    Red Blood Cell Count 11/08/2023 4.85  4.14 - 5.80 x10E6/uL Final    Hemoglobin 11/08/2023 14.1  13.0 - 17.7 g/dL Final    HCT 11/08/2023 42.2  37.5 - 51.0 % Final    MCV 11/08/2023 87  79 - 97 fL Final    MCH 11/08/2023 29.1  26.6 - 33.0 pg Final    MCHC 11/08/2023 33.4  31.5 - 35.7 g/dL Final    RDW 11/08/2023 12.2  11.6 - 15.4 % Final    Platelet Count 63/52/9117 318  150 - 450 x10E3/uL Final    Neutrophils 11/08/2023 64  Not Estab. % Final    Lymphocytes 11/08/2023 19  Not Estab. % Final    Monocytes 11/08/2023 11  Not Estab. % Final    Eosinophils 11/08/2023 4  Not Estab. % Final    Basophils PCT 11/08/2023 1  Not Estab. % Final    Neutrophils (Absolute) 11/08/2023 6.1  1.4 - 7.0 x10E3/uL Final    Lymphocytes (Absolute) 11/08/2023 1.8  0.7 - 3.1 x10E3/uL Final    Monocytes (Absolute) 11/08/2023 1.0 (H)  0.1 - 0.9 x10E3/uL Final    Eosinophils (Absolute) 11/08/2023 0.4  0.0 - 0.4 x10E3/uL Final    Basophils ABS 11/08/2023 0.1  0.0 - 0.2 x10E3/uL Final    Immature Granulocytes 11/08/2023 1  Not Estab. % Final    Immature Granulocytes (Absolute) 11/08/2023 0.1  0.0 - 0.1 x10E3/uL Final   Orders Only on 11/01/2023   Component Date Value Ref Range Status    White Blood Cell Count 11/01/2023 6.7  3.4 - 10.8 x10E3/uL Final    Red Blood Cell Count 11/01/2023 4.64  4.14 - 5.80 x10E6/uL Final    Hemoglobin 11/01/2023 13.7  13.0 - 17.7 g/dL Final    HCT 11/01/2023 40.6  37.5 - 51.0 % Final    MCV 11/01/2023 88  79 - 97 fL Final    MCH 11/01/2023 29.5  26.6 - 33.0 pg Final    MCHC 11/01/2023 33.7  31.5 - 35.7 g/dL Final    RDW 11/01/2023 12.0  11.6 - 15.4 % Final    Platelet Count 68/58/7914 297  150 - 450 x10E3/uL Final    Neutrophils 11/01/2023 65  Not Estab. % Final    Lymphocytes 11/01/2023 21  Not Estab. % Final    Monocytes 11/01/2023 9  Not Estab. % Final    Eosinophils 11/01/2023 4  Not Estab. % Final    Basophils PCT 11/01/2023 1  Not Estab. % Final    Neutrophils (Absolute) 11/01/2023 4.4  1.4 - 7.0 x10E3/uL Final    Lymphocytes (Absolute) 11/01/2023 1.4  0.7 - 3.1 x10E3/uL Final    Monocytes (Absolute) 11/01/2023 0.6  0.1 - 0.9 x10E3/uL Final    Eosinophils (Absolute) 11/01/2023 0.2  0.0 - 0.4 x10E3/uL Final    Basophils ABS 11/01/2023 0.1  0.0 - 0.2 x10E3/uL Final    Immature Granulocytes 11/01/2023 0  Not Estab. % Final    Immature Granulocytes (Absolute) 11/01/2023 0.0  0.0 - 0.1 x10E3/uL Final   Ancillary Orders on 10/27/2023   Component Date Value Ref Range Status    White Blood Cell Count 11/20/2023 14.3 (H)  3.4 - 10.8 x10E3/uL Final    Red Blood Cell Count 11/20/2023 4.82  4.14 - 5.80 x10E6/uL Final    Hemoglobin 11/20/2023 14.3  13.0 - 17.7 g/dL Final    HCT 11/20/2023 41.3  37.5 - 51.0 % Final    MCV 11/20/2023 86  79 - 97 fL Final    MCH 11/20/2023 29.7  26.6 - 33.0 pg Final    MCHC 11/20/2023 34.6  31.5 - 35.7 g/dL Final    RDW 11/20/2023 12.4  11.6 - 15.4 % Final    Platelet Count 66/22/5706 281  150 - 450 x10E3/uL Final    Neutrophils 11/20/2023 77  Not Estab. % Final    Lymphocytes 11/20/2023 11  Not Estab. % Final    Monocytes 11/20/2023 11  Not Estab. % Final    Eosinophils 11/20/2023 1  Not Estab. % Final    Basophils PCT 11/20/2023 0  Not Estab. % Final    Neutrophils (Absolute) 11/20/2023 11.0 (H)  1.4 - 7.0 x10E3/uL Final    Lymphocytes (Absolute) 11/20/2023 1.6  0.7 - 3.1 x10E3/uL Final    Monocytes (Absolute) 11/20/2023 1.5 (H)  0.1 - 0.9 x10E3/uL Final    Eosinophils (Absolute) 11/20/2023 0.1  0.0 - 0.4 x10E3/uL Final    Basophils ABS 11/20/2023 0.1  0.0 - 0.2 x10E3/uL Final    Immature Granulocytes 11/20/2023 0  Not Estab. % Final    Immature Granulocytes (Absolute) 11/20/2023 0.0  0.0 - 0.1 x10E3/uL Final   Office Visit on 10/25/2023   Component Date Value Ref Range Status    White Blood Cell Count 10/26/2023 5.8  3.4 - 10.8 x10E3/uL Final    Red Blood Cell Count 10/26/2023 4.45  4.14 - 5.80 x10E6/uL Final    Hemoglobin 10/26/2023 13.3  13.0 - 17.7 g/dL Final    HCT 10/26/2023 38.8  37.5 - 51.0 % Final    MCV 10/26/2023 87  79 - 97 fL Final    MCH 10/26/2023 29.9  26.6 - 33.0 pg Final    MCHC 10/26/2023 34.3  31.5 - 35.7 g/dL Final    RDW 10/26/2023 12.2  11.6 - 15.4 % Final    Platelet Count 11/31/8168 256  150 - 450 x10E3/uL Final    Neutrophils 10/26/2023 62  Not Estab. % Final    Lymphocytes 10/26/2023 24  Not Estab. % Final    Monocytes 10/26/2023 9  Not Estab. % Final    Eosinophils 10/26/2023 4  Not Estab. % Final    Basophils PCT 10/26/2023 1  Not Estab. % Final    Neutrophils (Absolute) 10/26/2023 3.6  1.4 - 7.0 x10E3/uL Final    Lymphocytes (Absolute) 10/26/2023 1.4  0.7 - 3.1 x10E3/uL Final    Monocytes (Absolute) 10/26/2023 0.5  0.1 - 0.9 x10E3/uL Final    Eosinophils (Absolute) 10/26/2023 0.2  0.0 - 0.4 x10E3/uL Final    Basophils ABS 10/26/2023 0.1  0.0 - 0.2 x10E3/uL Final    Immature Granulocytes 10/26/2023 0  Not Estab. % Final    Immature Granulocytes (Absolute) 10/26/2023 0.0  0.0 - 0.1 x10E3/uL Final   Office Visit on 08/16/2023   Component Date Value Ref Range Status    25-HYDROXY VIT D 10/16/2023 38.1  30.0 - 100.0 ng/mL Final    Comment: Vitamin D deficiency has been defined by the [place] of  Medicine and an Endocrine Society practice guideline as a  level of serum 25-OH vitamin D less than 20 ng/mL (1,2). The Endocrine Society went on to further define vitamin D  insufficiency as a level between 21 and 29 ng/mL (2). 1. IOM ([place] of Medicine). 2010. Dietary reference     intakes for calcium and D. [employer]: The     [employer].   2. Iwona MARROQUIN, Yaritza STERN, Chandan HERNANDEZ, et al.     Evaluation, treatment, and prevention of vitamin D     deficiency: an Endocrine Society clinical practice     guideline. JCEM. 2011 Jul; 96(1):1911-30. Orders Only on 08/03/2023   Component Date Value Ref Range Status    Prolactin 08/10/2023 46.2 (H)  4.0 - 15.2 ng/mL Final   Orders Only on 07/31/2023   Component Date Value Ref Range Status    White Blood Cell Count 07/31/2023 4.9  3.4 - 10.8 x10E3/uL Final    Red Blood Cell Count 07/31/2023 4.87  4.14 - 5.80 x10E6/uL Final    Hemoglobin 07/31/2023 14.4  13.0 - 17.7 g/dL Final    HCT 07/31/2023 42.7  37.5 - 51.0 % Final    MCV 07/31/2023 88  79 - 97 fL Final    MCH 07/31/2023 29.6  26.6 - 33.0 pg Final    MCHC 07/31/2023 33.7  31.5 - 35.7 g/dL Final    RDW 07/31/2023 12.2  11.6 - 15.4 % Final    Platelet Count 75/56/7800 284  150 - 450 x10E3/uL Final    Neutrophils 07/31/2023 57  Not Estab. % Final    Lymphocytes 07/31/2023 31  Not Estab. % Final    Monocytes 07/31/2023 7  Not Estab. % Final    Eosinophils 07/31/2023 4  Not Estab. % Final    Basophils PCT 07/31/2023 1  Not Estab. % Final    Neutrophils (Absolute) 07/31/2023 2.8  1.4 - 7.0 x10E3/uL Final    Lymphocytes (Absolute) 07/31/2023 1.5  0.7 - 3.1 x10E3/uL Final    Monocytes (Absolute) 07/31/2023 0.4  0.1 - 0.9 x10E3/uL Final    Eosinophils (Absolute) 07/31/2023 0.2  0.0 - 0.4 x10E3/uL Final    Basophils ABS 07/31/2023 0.1  0.0 - 0.2 x10E3/uL Final    Immature Granulocytes 07/31/2023 0  Not Estab. % Final    Immature Granulocytes (Absolute) 07/31/2023 0.0  0.0 - 0.1 x10E3/uL Final   There may be more visits with results that are not included.             ___________________________________    History Review:  The following portions of the patient's history were reviewed and updated as appropriate: allergies, current medications, past family history, past medical history, past social history, past surgical history, and problem list.      Unchanged information from this writer's previous assessment is copied and italicized; information that has changed is bolded.     Past Psychiatric History:      Past psychiatric diagnoses:   Schizophrenia, schizoaffective d/o, anxiety, depression   Inpatient psychiatric admissions:   10 times total (4 were involuntary)  First - age 25 after stealing sisters klonopin  Most common reason: first few were "no reason"  2017 - got invega shot was a very influential moment  2 suicide attempts/SI: SI and wanting to jump off a roof (2021) a few months later after sleeping pills, tried to hang himself 2 years ago  Psychosis 3 times (most recent for that reason was 3 years)  Most recent was 2 years ago  Has stayed out of the hospital due to "taking his medications, not having suicidal ideation"  Denies history of ECT, denies having had ICM in past.   Prior outpatient psychiatric treatment:   Saw Jomar Ramirez for 3 years  [facility] for a year before that  Past/current psychotherapy:   Worked with a therapist at [employer], stopped seeing 1-2 years ago because he left the practice  History of suicidal attempts/gestures:   2: took sleeping pills (August 2021), tried to hang self two years  History of non-suicidal self-injurious behavior:   None  History of violence/aggressive behaviors:   None  Psychotropic medication trials:   Invega, (discomfort) risperdal, zyprexa, thorazine (all unknown reasons for discontinuation)  Abilify (bad reaction)  Invega SCHAEFER x1 in 2019 ("hurt his head")  Ativan (caused psychosis)  Loxapine - feeling too tired on dosages above 20mg  Risperdal - above 2mg causes nausea and "hurting" in his head  Substance abuse inpatient/outpatient rehabilitation:   Rehab in 2017 - marijuana use  Eating disorder history:   No  Other services: used to have a  but reports he does not anymore because they ran out of things to do together      Substance Abuse History:        States he was smoking a lot of marijuana for 5 years, nearly every day, stopped smoking 2019 July after he "freaked out" and thought "there were people in his basement and living in his house and torturing him. "     Denies any other recreational drug use and otherwise denies history of alcohol, illict substance, or tobacco abuse., Patient denies previous legal actions or arrests related to substance intoxication including prior DWIs/DUIs., Patient does not exhibit objective evidence of substance withdrawal during today's examination nor do they appear under the influence of any psychoactive substance. Family Psychiatric History:      Family History   History reviewed. No pertinent family history. Psychiatric Family History: Per patient he is adopted,  Per mother is not adopted. Maternal grandfather - schizophrenia  Tex's half sister has bipolar disorder     Social History:     Developmental: Was in special education, denies IEP  Per care everywhere, mild developmental delay, early intervention at age 2.5. Concern for autism, diagnosed with ADHD. Has 2 sister (54 and 29years old), states he was adopted but his mother reports he is not adopted  Education: high school diploma/GED  Marital history: single  Children: none  Living arrangement, social support: Family is supportive, mom is supportive. Lives with mother, on a waiting list for housing, on disability for schizoaffective disorder. Dad lives in [facility]   Occupational History: Permanent disability. 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 , Patient denies history of arrests or violence with a deadly weapon.     history: None     Traumatic History:      Abuse: Denies  Other Traumatic Events: Bullying, and would not disclose further  ___________________________________      Visit Time    Visit Start Time: 1:00  Visit Stop Time: 1:42  Total Visit Duration: 42 minutes    Lory Castillo MD   12/06/23

## 2023-12-06 NOTE — PATIENT INSTRUCTIONS
We are going to stop the loxapine, because it is at a low dose it is safe to discontinue right away. Continue taking clozapine at the same dosage, 100mg. If you feel like it makes you tired during the day, it is okay for you to start taking the clozapine at bed time. When you get your blood drawn next week, please also obtain a clozapine level, we provided a lab slip for this. If you notice any symptoms coming back, such as talking to yourself, feeling more angry or distressed, hearing voices, please call the office. Because we are getting rid of the loxapine which also helps with these symptoms, we may find that we need to increase the clozapine slightly to fill that gap from stopping the loxapine. This does not mean the clozapine is not working, it just means we need a little bit more to get the full benefit. Please increase physical activity, try to be active for 150 minutes per week, but working up to that goal slowly is reasonable.

## 2023-12-07 DIAGNOSIS — F20.9 SCHIZOPHRENIA, UNSPECIFIED TYPE (HCC): ICD-10-CM

## 2023-12-07 RX ORDER — RISPERIDONE 2 MG/1
2 TABLET ORAL
Qty: 30 TABLET | Refills: 1 | Status: SHIPPED | OUTPATIENT
Start: 2023-12-07

## 2023-12-07 NOTE — TELEPHONE ENCOUNTER
Patient requested refill of :  Requested Prescriptions     Pending Prescriptions Disp Refills    risperiDONE (RisperDAL) 2 mg tablet 30 tablet 1     Sig: Take 1 tablet (2 mg total) by mouth daily at bedtime         Refill request approved and sent to pharmacy on file per patient request.

## 2023-12-07 NOTE — TELEPHONE ENCOUNTER
Mom called, said that Faith Adames forgot to tell you that he needs a refill on the risperidone 2mg, can be sent to [facility]

## 2023-12-13 LAB
CLOZAPINE SERPL-MCNC: 70 NG/ML (ref 350–600)
CLOZAPINE+NOR SERPL-MCNC: 137 NG/ML
NORCLOZAPINE SERPL-MCNC: 67 NG/ML

## 2023-12-14 ENCOUNTER — TELEPHONE (OUTPATIENT)
Dept: PSYCHIATRY | Facility: CLINIC | Age: 28
End: 2023-12-14

## 2023-12-14 NOTE — TELEPHONE ENCOUNTER
Called and spoke with Tex's mother regarding the labwork results. We will continue to treat based on his symptom presentation, he is tolerating the discontinuation of loxapine with no recurrence of symptoms at this time. She brought up concerns regarding weight gain and we discussed the importance of increasing physical activity and making conscious dietary choices, as well as considering medication additions or referral to nutritionist in the future. We will discuss further at his next appointment in January.

## 2023-12-14 NOTE — TELEPHONE ENCOUNTER
Mom called, was wondering about Tex's bloodwork results specifically the clozapine because she can't read that result on his MyChart

## 2023-12-19 DIAGNOSIS — F20.9 SCHIZOPHRENIA, UNSPECIFIED TYPE (HCC): ICD-10-CM

## 2023-12-19 DIAGNOSIS — E55.9 VITAMIN D DEFICIENCY: ICD-10-CM

## 2023-12-19 DIAGNOSIS — Z15.89 HOMOZYGOUS MTHFR MUTATION C677T: ICD-10-CM

## 2023-12-19 RX ORDER — VITAMIN B COMPLEX
1 CAPSULE ORAL DAILY
Qty: 30 CAPSULE | Refills: 3 | Status: SHIPPED | OUTPATIENT
Start: 2023-12-19

## 2023-12-19 RX ORDER — SWAB
1 SWAB, NON-MEDICATED MISCELLANEOUS DAILY
Qty: 30 CAPSULE | Refills: 3 | Status: SHIPPED | OUTPATIENT
Start: 2023-12-19

## 2023-12-19 RX ORDER — CLOZAPINE 100 MG/1
100 TABLET ORAL DAILY
Qty: 30 TABLET | Refills: 1 | Status: SHIPPED | OUTPATIENT
Start: 2023-12-19 | End: 2024-01-03

## 2023-12-19 NOTE — TELEPHONE ENCOUNTER
Patient requested refill of :  Requested Prescriptions     Pending Prescriptions Disp Refills    b complex vitamins capsule 30 capsule 3     Sig: Take 1 capsule by mouth daily    cloZAPine (CLOZARIL) 100 mg tablet 30 tablet 1     Sig: Take 1 tablet (100 mg total) by mouth daily    Vitamin D, Cholecalciferol, 10 MCG (400 UNIT) CAPS 30 capsule 3     Sig: Take 1 capsule (400 Units total) by mouth in the morning         Refill request approved and sent to pharmacy on file per patient request.

## 2023-12-26 LAB
BASOPHILS # BLD AUTO: 0.1 X10E3/UL (ref 0–0.2)
BASOPHILS NFR BLD AUTO: 1 %
EOSINOPHIL # BLD AUTO: 0.3 X10E3/UL (ref 0–0.4)
EOSINOPHIL NFR BLD AUTO: 4 %
ERYTHROCYTE [DISTWIDTH] IN BLOOD BY AUTOMATED COUNT: 13.2 % (ref 11.6–15.4)
HCT VFR BLD AUTO: 39.8 % (ref 37.5–51)
HGB BLD-MCNC: 13.4 G/DL (ref 13–17.7)
IMM GRANULOCYTES # BLD: 0 X10E3/UL (ref 0–0.1)
IMM GRANULOCYTES NFR BLD: 0 %
LYMPHOCYTES # BLD AUTO: 1.7 X10E3/UL (ref 0.7–3.1)
LYMPHOCYTES NFR BLD AUTO: 29 %
MCH RBC QN AUTO: 29.2 PG (ref 26.6–33)
MCHC RBC AUTO-ENTMCNC: 33.7 G/DL (ref 31.5–35.7)
MCV RBC AUTO: 87 FL (ref 79–97)
MONOCYTES # BLD AUTO: 0.6 X10E3/UL (ref 0.1–0.9)
MONOCYTES NFR BLD AUTO: 10 %
NEUTROPHILS # BLD AUTO: 3.3 X10E3/UL (ref 1.4–7)
NEUTROPHILS NFR BLD AUTO: 56 %
PLATELET # BLD AUTO: 250 X10E3/UL (ref 150–450)
RBC # BLD AUTO: 4.59 X10E6/UL (ref 4.14–5.8)
WBC # BLD AUTO: 5.9 X10E3/UL (ref 3.4–10.8)

## 2024-01-02 NOTE — BH TREATMENT PLAN
"TREATMENT PLAN - Medication Management        [facility] MENTAL HEALTH OUTPATIENT    Name and Date of Birth:  Tex Hill 28 y.o. 1995  Date of Treatment Plan: January 3, 2024  Diagnosis/Diagnoses:    1. Schizophrenia, unspecified type (HCC)    2. Encounter for medication monitoring    3. Long term current use of antipsychotic medication    4. Drug-induced constipation        Strengths/Personal Resources for Self-Care: supportive family, taking medications as prescribed, ability to adapt to life changes, ability to communicate needs, good physical health, independence, ability to negotiate basic needs    Area/Areas of need: \"stress\"    Long Term Goal: continue improvement in psychotic symptoms  Target Date: 6 months - July 3, 2024  Person/Persons responsible for completion of goal: Tex and Lauren Stockton MD     Short Term Objective (s) - How will we reach this goal?:   Take medications as prescribed  Attend psychiatry appointments regularly  Continue to try to find a talk therapist  Practice coping skills  Avoid alcohol   Avoid drugs   Eat a healthy diet   Exercise regularly   Target Date: 6 months - July 3, 2024  Person/Persons Responsible for Completion of Goal: Tex     Progress Towards Goals: Continuing treatment    Treatment Modality: medication management every 1-3 months as needed  Review due 180 days from date of this plan: July 1, 2024   Expected length of service: Ongoing treatment    My physician and I have developed this plan together, and I agree to work on the goals and objectives. I understand the treatment goals that were developed for my treatment.    The treatment plan was created between Lauren Stockton MD and Tex Hill on 01/03/24 at 3:43 PM but not signed at the time of the visit due to COVID social distancing. The plan was reviewed, and verbal consent was given.       "

## 2024-01-02 NOTE — PSYCH
MEDICATION MANAGEMENT NOTE      [facility]   Mental Health Outpatient Services   [address] [place] [id number] [phone]    Name and Date of Birth:  Tex Hill 28 y.o. 1995 MRN: [id number]    Date of Visit: January 3, 2024    Reason for Visit: Follow-up visit regarding medication management     _____________________________    Assessment/Plan   Tex Hill is a 28 y.o. male, Single, domiciled with mother, on permanent disability, with past medical history of Vitamin D deficiency, prior psychiatric diagnoses of schizoaffective disorder; multiple hospitalizations and two suicide attempts (most recently 2021).  Tex was personally seen and evaluated today at the [facility] outpatient clinic for follow-up regarding medication management.      On assessment, Tex Hill reports that he has continued to be stable on his current medication regimen. He continues to deny AH and denies talking to himself. He has been sleeping well. He notices that he is feeling tired and his head hurts at times after he takes the clozapine. We discussed taking it in the evening instead of the morning and he agrees to try this. We discussed decreasing and discontinuing the risperdal in favor of antipsychotic monotherapy with clozapine however Tex is resistant to this and declines at this time. We discussed his recent weight gain and discussed the negative implications for metabolism including risk of diabetes and hyperlipidemia risk due to weight gain and taking antipsychotic medication, however Tex states that he likes the weight gain and would not like to make any changes. We discussed nutritional changes such as decreasing consumption of sugary beverages (iced tea), and possibility of adding medications to counteract negative metabolic effects of antipsychotics, however Tex declines at this time. Tex reports he spends his day mostly  sleeping, and feels unmotivated and has very little routine. He is agreeable to speaking to a behavioral health navigator to discuss available programs which may be beneficial socially.     DSM-5 Diagnoses:     1. Schizophrenia, unspecified type (HCC)    2. Encounter for medication monitoring    3. Long term current use of antipsychotic medication    4. Drug-induced constipation        Treatment Recommendations/Precautions:  Continue psychopharmacological management as follows:  Continue risperdal 2mg HS for psychosis  Clozapine 100mg HS (pt taking in AM), for psychosis  Continue weekly CBC with differential per [facility] program  Most recent ANC = 4.7 on 1/2/24  Clozapine level = 70 (low) on 12/11  Continue vitamin D supplementation for deficiency  Continue daily sennakot for clozapine induced constipation  Melatonin 1mg HS PRN for sleep-wake cycle regulation  Labs most recently obtained January 2024, reviewed.   Agreeable to speak to behavioral health navigator  Met with Katie Rod RN regarding metabolic monitoring  Follow up in 4 weeks for medication management  On waiting list for individual psychotherapy  Follow up with PCP for medical issues and ongoing care  Aware of 24 hour and weekend coverage for urgent situations accessed by calling [facility] Outpatient main practice number    Individual psychotherapy provided: No     Treatment Plan:     Completed and signed during the session: Yes - Treatment Plan done but not signed at time of office visit due to:  Plan reviewed in person and verbal consent given due to COVID social distancing    Medications Risks/Benefits:      Risks, Benefits And Possible Side Effects Of Medications:    Risks, benefits, and possible side effects of medications explained to Tex and he verbalizes understanding and agreement for treatment.     Controlled Medication Discussion:     Not applicable    Medical Decision Making / Counseling / Coordination of  "Care:  The following interventions are recommended: return in 1 month for follow up.  Although patient's acute lethality risk is LOW, long-term/chronic lethality risk is mildly elevated given the risk factors listed above. However, at the current moment, Tex is future-oriented, forward-thinking, and demonstrates ability to act in a self-preserving manner as evidenced by volitionally seeking psychiatric evaluation and treatment today. To mitigate future risk, patient should adhere to treatment recommendations, avoid alcohol/illicit substance use, utilize community-based resources and familiar support, and prioritize mental health treatment. The diagnosis and treatment plan were reviewed with the patient. Risks, benefits, and alternatives to treatment were discussed. The importance of medication and treatment compliance was reviewed with the patient.     _____________________________________________    History of Present Illness     Chief Complaint: \"I''m alright\"    SUBJECTIVE:    Tex Hill is a 28 y.o. male, possessing a past psychiatric history significant for schizophrenia, who was personally seen and evaluated at the [facility] outpatient clinic for follow-up regarding medication management.  At their last visit, the plan was to discontinue loxapine to reduce polypharmacy, and continue other medications.    Tex states that since their previous psychiatric appointment with this writer, things have been going alright overall. He reports that after he takes the clozapine he experiences some head 'numbness' and tiredness which wears off within a couple hours. He has noticed an increase in appetite and has gained 10 lbs. He has not been physically active. Has not been thinking about his past traumas or reliving events. Having regular bowel movements, denies chest pain. He reports his days are largely unstructured, and he feels \"this is the best I can be.\" He overall likes his current " medication regimen and does not wish to make any changes. He denies hopelessness or thoughts to harm himself or others.    Presently, patient denies suicidal/homicidal ideation in addition to thoughts of self-injury.  At conclusion of evaluation, patient is amenable to the recommendations of this writer including: continue psychotropic medications as prescribed.  Also, patient is amenable to calling/contacting the outpatient office including this writer if any acute adverse effects of their medication regimen arise in addition to any comments or concerns pertaining to their psychiatric management.  Patient is amenable to calling/contacting crisis and/or attending to the nearest emergency department if their clinical condition deteriorates to assure their safety and stability, stating that they are able to appropriately confide in their mother regarding their psychiatric state.    Current Rating Scores:     None completed today.    Psychiatric Review Of Systems:  Unchanged information from this writer's previous assessment is copied and italicized; information that has changed is bolded.    Appetite: increased  Adverse eating: no  Weight changes: increased weight, estimates 10 lbs  Insomnia/sleeplessness: no  Fatigue/anergy: low  Anhedonia/lack of interest: stable  Attention/concentration: improving  Psychomotor agitation/retardation: no  Somatic symptoms: no  Anxiety/panic attack: denies  Mylene/hypomania: no  Hopelessness/helplessness/worthlessness: no  Self-injurious behavior/high-risk behavior: no  Suicidal ideation: no  Homicidal ideation: no  Auditory hallucinations: denies  Visual hallucinations: no  Other perceptual disturbances: no  Delusional thinking: no  Obsessive/compulsive symptoms: no       Review Of Systems:      Constitutional negative   ENT negative   Cardiovascular negative   Respiratory negative   Gastrointestinal negative   Genitourinary negative   Musculoskeletal negative   Integumentary negative    Neurological negative   Endocrine negative   Other Symptoms none, all other systems are negative     Objective    OBJECTIVE:     Visit Vitals  Smoking Status Never      Wt Readings from Last 6 Encounters:   No data found for Wt        No past medical history on file.   No past surgical history on file.    Meds/Allergies    Allergies   Allergen Reactions    Gantrisin [Sulfisoxazole] Hallucinations     Took as a child and had hallucinations per mother     Current Outpatient Medications   Medication Instructions    b complex vitamins capsule 1 capsule, Oral, Daily    cloZAPine (CLOZARIL) 100 mg, Oral, Daily at bedtime    melatonin 1 mg, Oral, Daily at bedtime PRN    risperiDONE (RISPERDAL) 2 mg, Oral, Daily at bedtime    senna (SENOKOT) 8.6 mg, Oral, Daily at bedtime    Vitamin D (Cholecalciferol) 400 Units, Oral, Daily           Mental Status Exam:    Appearance age appropriate, casually dressed, intermittent eye contact   Behavior cooperative, calm   Speech normal rate, normal volume, normal pitch, somewhat scant   Mood euthymic   Affect constricted   Thought Processes organized, goal directed   Associations intact associations   Thought Content no overt delusions   Perceptual Disturbances: Denies auditory or visual hallucinations and Does not appear to be responding to internal stimuli   Abnormal Thoughts  Risk Potential Denies suicidal or homicidal ideation, plan, or intent   Orientation oriented to person, place, time/date, and situation   Memory recent and remote memory grossly intact   Consciousness alert and awake   Attention Span Concentration Span attention span and concentration are age appropriate   Intellect appears to be of average intelligence   Insight fair   Judgement fair   Muscle Strength and  Gait normal muscle strength and normal muscle tone, normal gait and normal balance   Motor Activity no abnormal movements   Language no difficulty naming common objects, no difficulty repeating a phrase, no  difficulty writing a sentence   Fund of Knowledge adequate knowledge of current events  adequate fund of knowledge regarding past history  adequate fund of knowledge regarding vocabulary      Laboratory Results: I have personally reviewed all pertinent laboratory/tests results    Orders Only on 01/02/2024   Component Date Value Ref Range Status    White Blood Cell Count 01/02/2024 7.6  3.4 - 10.8 x10E3/uL Final    Red Blood Cell Count 01/02/2024 4.54  4.14 - 5.80 x10E6/uL Final    Hemoglobin 01/02/2024 13.5  13.0 - 17.7 g/dL Final    HCT 01/02/2024 39.0  37.5 - 51.0 % Final    MCV 01/02/2024 86  79 - 97 fL Final    MCH 01/02/2024 29.7  26.6 - 33.0 pg Final    MCHC 01/02/2024 34.6  31.5 - 35.7 g/dL Final    RDW 01/02/2024 13.0  11.6 - 15.4 % Final    Platelet Count 01/02/2024 263  150 - 450 x10E3/uL Final    Neutrophils 01/02/2024 63  Not Estab. % Final    Lymphocytes 01/02/2024 24  Not Estab. % Final    Monocytes 01/02/2024 9  Not Estab. % Final    Eosinophils 01/02/2024 3  Not Estab. % Final    Basophils PCT 01/02/2024 1  Not Estab. % Final    Neutrophils (Absolute) 01/02/2024 4.7  1.4 - 7.0 x10E3/uL Final    Lymphocytes (Absolute) 01/02/2024 1.8  0.7 - 3.1 x10E3/uL Final    Monocytes (Absolute) 01/02/2024 0.7  0.1 - 0.9 x10E3/uL Final    Eosinophils (Absolute) 01/02/2024 0.3  0.0 - 0.4 x10E3/uL Final    Basophils ABS 01/02/2024 0.1  0.0 - 0.2 x10E3/uL Final    Immature Granulocytes 01/02/2024 0  Not Estab. % Final    Immature Granulocytes (Absolute) 01/02/2024 0.0  0.0 - 0.1 x10E3/uL Final   Orders Only on 12/26/2023   Component Date Value Ref Range Status    White Blood Cell Count 12/26/2023 5.9  3.4 - 10.8 x10E3/uL Final    Red Blood Cell Count 12/26/2023 4.59  4.14 - 5.80 x10E6/uL Final    Hemoglobin 12/26/2023 13.4  13.0 - 17.7 g/dL Final    HCT 12/26/2023 39.8  37.5 - 51.0 % Final    MCV 12/26/2023 87  79 - 97 fL Final    MCH 12/26/2023 29.2  26.6 - 33.0 pg Final    MCHC 12/26/2023 33.7  31.5 - 35.7 g/dL  Final    RDW 12/26/2023 13.2  11.6 - 15.4 % Final    Platelet Count 12/26/2023 250  150 - 450 x10E3/uL Final    Neutrophils 12/26/2023 56  Not Estab. % Final    Lymphocytes 12/26/2023 29  Not Estab. % Final    Monocytes 12/26/2023 10  Not Estab. % Final    Eosinophils 12/26/2023 4  Not Estab. % Final    Basophils PCT 12/26/2023 1  Not Estab. % Final    Neutrophils (Absolute) 12/26/2023 3.3  1.4 - 7.0 x10E3/uL Final    Lymphocytes (Absolute) 12/26/2023 1.7  0.7 - 3.1 x10E3/uL Final    Monocytes (Absolute) 12/26/2023 0.6  0.1 - 0.9 x10E3/uL Final    Eosinophils (Absolute) 12/26/2023 0.3  0.0 - 0.4 x10E3/uL Final    Basophils ABS 12/26/2023 0.1  0.0 - 0.2 x10E3/uL Final    Immature Granulocytes 12/26/2023 0  Not Estab. % Final    Immature Granulocytes (Absolute) 12/26/2023 0.0  0.0 - 0.1 x10E3/uL Final   Ancillary Orders on 12/15/2023   Component Date Value Ref Range Status    White Blood Cell Count 12/18/2023 6.3  3.4 - 10.8 x10E3/uL Final    Red Blood Cell Count 12/18/2023 4.60  4.14 - 5.80 x10E6/uL Final    Hemoglobin 12/18/2023 13.3  13.0 - 17.7 g/dL Final    HCT 12/18/2023 40.6  37.5 - 51.0 % Final    MCV 12/18/2023 88  79 - 97 fL Final    MCH 12/18/2023 28.9  26.6 - 33.0 pg Final    MCHC 12/18/2023 32.8  31.5 - 35.7 g/dL Final    RDW 12/18/2023 13.1  11.6 - 15.4 % Final    Platelet Count 12/18/2023 252  150 - 450 x10E3/uL Final    Neutrophils 12/18/2023 61  Not Estab. % Final    Lymphocytes 12/18/2023 27  Not Estab. % Final    Monocytes 12/18/2023 7  Not Estab. % Final    Eosinophils 12/18/2023 4  Not Estab. % Final    Basophils PCT 12/18/2023 1  Not Estab. % Final    Neutrophils (Absolute) 12/18/2023 3.9  1.4 - 7.0 x10E3/uL Final    Lymphocytes (Absolute) 12/18/2023 1.7  0.7 - 3.1 x10E3/uL Final    Monocytes (Absolute) 12/18/2023 0.4  0.1 - 0.9 x10E3/uL Final    Eosinophils (Absolute) 12/18/2023 0.2  0.0 - 0.4 x10E3/uL Final    Basophils ABS 12/18/2023 0.1  0.0 - 0.2 x10E3/uL Final    Immature Granulocytes  12/18/2023 0  Not Estab. % Final    Immature Granulocytes (Absolute) 12/18/2023 0.0  0.0 - 0.1 x10E3/uL Final   Orders Only on 12/11/2023   Component Date Value Ref Range Status    Clozapine Lvl 12/11/2023 70 (L)  350 - 600 ng/mL Final    NorClozapine 12/11/2023 67  Not Estab. ng/mL Final    Total(Cloz+Norcloz) 12/11/2023 137  ng/mL Final    Comment: Plasma concentrations of clozapine plus norclozapine (combined total)  greater than 450 ng/mL have been associated with therapeutic effect.                                  Detection Limit = 20     Ancillary Orders on 12/08/2023   Component Date Value Ref Range Status    White Blood Cell Count 12/11/2023 6.1  3.4 - 10.8 x10E3/uL Final    Red Blood Cell Count 12/11/2023 4.51  4.14 - 5.80 x10E6/uL Final    Hemoglobin 12/11/2023 13.3  13.0 - 17.7 g/dL Final    HCT 12/11/2023 39.4  37.5 - 51.0 % Final    MCV 12/11/2023 87  79 - 97 fL Final    MCH 12/11/2023 29.5  26.6 - 33.0 pg Final    MCHC 12/11/2023 33.8  31.5 - 35.7 g/dL Final    RDW 12/11/2023 12.4  11.6 - 15.4 % Final    Platelet Count 12/11/2023 311  150 - 450 x10E3/uL Final    Neutrophils 12/11/2023 64  Not Estab. % Final    Lymphocytes 12/11/2023 27  Not Estab. % Final    Monocytes 12/11/2023 5  Not Estab. % Final    Eosinophils 12/11/2023 3  Not Estab. % Final    Basophils PCT 12/11/2023 1  Not Estab. % Final    Neutrophils (Absolute) 12/11/2023 3.9  1.4 - 7.0 x10E3/uL Final    Lymphocytes (Absolute) 12/11/2023 1.6  0.7 - 3.1 x10E3/uL Final    Monocytes (Absolute) 12/11/2023 0.3  0.1 - 0.9 x10E3/uL Final    Eosinophils (Absolute) 12/11/2023 0.2  0.0 - 0.4 x10E3/uL Final    Basophils ABS 12/11/2023 0.1  0.0 - 0.2 x10E3/uL Final    Immature Granulocytes 12/11/2023 0  Not Estab. % Final    Immature Granulocytes (Absolute) 12/11/2023 0.0  0.0 - 0.1 x10E3/uL Final   Orders Only on 12/04/2023   Component Date Value Ref Range Status    White Blood Cell Count 12/04/2023 6.5  3.4 - 10.8 x10E3/uL Final    Red Blood Cell  Count 12/04/2023 4.67  4.14 - 5.80 x10E6/uL Final    Hemoglobin 12/04/2023 13.6  13.0 - 17.7 g/dL Final    HCT 12/04/2023 41.2  37.5 - 51.0 % Final    MCV 12/04/2023 88  79 - 97 fL Final    MCH 12/04/2023 29.1  26.6 - 33.0 pg Final    MCHC 12/04/2023 33.0  31.5 - 35.7 g/dL Final    RDW 12/04/2023 12.7  11.6 - 15.4 % Final    Platelet Count 12/04/2023 318  150 - 450 x10E3/uL Final    Neutrophils 12/04/2023 59  Not Estab. % Final    Lymphocytes 12/04/2023 28  Not Estab. % Final    Monocytes 12/04/2023 9  Not Estab. % Final    Eosinophils 12/04/2023 3  Not Estab. % Final    Basophils PCT 12/04/2023 1  Not Estab. % Final    Neutrophils (Absolute) 12/04/2023 3.8  1.4 - 7.0 x10E3/uL Final    Lymphocytes (Absolute) 12/04/2023 1.8  0.7 - 3.1 x10E3/uL Final    Monocytes (Absolute) 12/04/2023 0.6  0.1 - 0.9 x10E3/uL Final    Eosinophils (Absolute) 12/04/2023 0.2  0.0 - 0.4 x10E3/uL Final    Basophils ABS 12/04/2023 0.1  0.0 - 0.2 x10E3/uL Final    Immature Granulocytes 12/04/2023 0  Not Estab. % Final    Immature Granulocytes (Absolute) 12/04/2023 0.0  0.0 - 0.1 x10E3/uL Final   Orders Only on 11/27/2023   Component Date Value Ref Range Status    White Blood Cell Count 11/27/2023 6.7  3.4 - 10.8 x10E3/uL Final    Red Blood Cell Count 11/27/2023 4.54  4.14 - 5.80 x10E6/uL Final    Hemoglobin 11/27/2023 13.2  13.0 - 17.7 g/dL Final    HCT 11/27/2023 39.2  37.5 - 51.0 % Final    MCV 11/27/2023 86  79 - 97 fL Final    MCH 11/27/2023 29.1  26.6 - 33.0 pg Final    MCHC 11/27/2023 33.7  31.5 - 35.7 g/dL Final    RDW 11/27/2023 12.4  11.6 - 15.4 % Final    Platelet Count 11/27/2023 330  150 - 450 x10E3/uL Final    Neutrophils 11/27/2023 70  Not Estab. % Final    Lymphocytes 11/27/2023 20  Not Estab. % Final    Monocytes 11/27/2023 6  Not Estab. % Final    Eosinophils 11/27/2023 3  Not Estab. % Final    Basophils PCT 11/27/2023 1  Not Estab. % Final    Neutrophils (Absolute) 11/27/2023 4.6  1.4 - 7.0 x10E3/uL Final    Lymphocytes  (Absolute) 11/27/2023 1.4  0.7 - 3.1 x10E3/uL Final    Monocytes (Absolute) 11/27/2023 0.4  0.1 - 0.9 x10E3/uL Final    Eosinophils (Absolute) 11/27/2023 0.2  0.0 - 0.4 x10E3/uL Final    Basophils ABS 11/27/2023 0.1  0.0 - 0.2 x10E3/uL Final    Immature Granulocytes 11/27/2023 0  Not Estab. % Final    Immature Granulocytes (Absolute) 11/27/2023 0.0  0.0 - 0.1 x10E3/uL Final   Ancillary Orders on 11/10/2023   Component Date Value Ref Range Status    White Blood Cell Count 11/15/2023 9.4  3.4 - 10.8 x10E3/uL Final    Red Blood Cell Count 11/15/2023 4.49  4.14 - 5.80 x10E6/uL Final    Hemoglobin 11/15/2023 13.6  13.0 - 17.7 g/dL Final    HCT 11/15/2023 38.8  37.5 - 51.0 % Final    MCV 11/15/2023 86  79 - 97 fL Final    MCH 11/15/2023 30.3  26.6 - 33.0 pg Final    MCHC 11/15/2023 35.1  31.5 - 35.7 g/dL Final    RDW 11/15/2023 12.5  11.6 - 15.4 % Final    Platelet Count 11/15/2023 283  150 - 450 x10E3/uL Final    Neutrophils 11/15/2023 68  Not Estab. % Final    Lymphocytes 11/15/2023 16  Not Estab. % Final    Monocytes 11/15/2023 10  Not Estab. % Final    Eosinophils 11/15/2023 4  Not Estab. % Final    Basophils PCT 11/15/2023 1  Not Estab. % Final    Neutrophils (Absolute) 11/15/2023 6.5  1.4 - 7.0 x10E3/uL Final    Lymphocytes (Absolute) 11/15/2023 1.5  0.7 - 3.1 x10E3/uL Final    Monocytes (Absolute) 11/15/2023 0.9  0.1 - 0.9 x10E3/uL Final    Eosinophils (Absolute) 11/15/2023 0.4  0.0 - 0.4 x10E3/uL Final    Basophils ABS 11/15/2023 0.1  0.0 - 0.2 x10E3/uL Final    Immature Granulocytes 11/15/2023 1  Not Estab. % Final    Immature Granulocytes (Absolute) 11/15/2023 0.1  0.0 - 0.1 x10E3/uL Final   Orders Only on 11/08/2023   Component Date Value Ref Range Status    White Blood Cell Count 11/08/2023 9.4  3.4 - 10.8 x10E3/uL Final    Red Blood Cell Count 11/08/2023 4.85  4.14 - 5.80 x10E6/uL Final    Hemoglobin 11/08/2023 14.1  13.0 - 17.7 g/dL Final    HCT 11/08/2023 42.2  37.5 - 51.0 % Final    MCV 11/08/2023 87  79 -  97 fL Final    MCH 11/08/2023 29.1  26.6 - 33.0 pg Final    MCHC 11/08/2023 33.4  31.5 - 35.7 g/dL Final    RDW 11/08/2023 12.2  11.6 - 15.4 % Final    Platelet Count 11/08/2023 318  150 - 450 x10E3/uL Final    Neutrophils 11/08/2023 64  Not Estab. % Final    Lymphocytes 11/08/2023 19  Not Estab. % Final    Monocytes 11/08/2023 11  Not Estab. % Final    Eosinophils 11/08/2023 4  Not Estab. % Final    Basophils PCT 11/08/2023 1  Not Estab. % Final    Neutrophils (Absolute) 11/08/2023 6.1  1.4 - 7.0 x10E3/uL Final    Lymphocytes (Absolute) 11/08/2023 1.8  0.7 - 3.1 x10E3/uL Final    Monocytes (Absolute) 11/08/2023 1.0 (H)  0.1 - 0.9 x10E3/uL Final    Eosinophils (Absolute) 11/08/2023 0.4  0.0 - 0.4 x10E3/uL Final    Basophils ABS 11/08/2023 0.1  0.0 - 0.2 x10E3/uL Final    Immature Granulocytes 11/08/2023 1  Not Estab. % Final    Immature Granulocytes (Absolute) 11/08/2023 0.1  0.0 - 0.1 x10E3/uL Final   Orders Only on 11/01/2023   Component Date Value Ref Range Status    White Blood Cell Count 11/01/2023 6.7  3.4 - 10.8 x10E3/uL Final    Red Blood Cell Count 11/01/2023 4.64  4.14 - 5.80 x10E6/uL Final    Hemoglobin 11/01/2023 13.7  13.0 - 17.7 g/dL Final    HCT 11/01/2023 40.6  37.5 - 51.0 % Final    MCV 11/01/2023 88  79 - 97 fL Final    MCH 11/01/2023 29.5  26.6 - 33.0 pg Final    MCHC 11/01/2023 33.7  31.5 - 35.7 g/dL Final    RDW 11/01/2023 12.0  11.6 - 15.4 % Final    Platelet Count 11/01/2023 297  150 - 450 x10E3/uL Final    Neutrophils 11/01/2023 65  Not Estab. % Final    Lymphocytes 11/01/2023 21  Not Estab. % Final    Monocytes 11/01/2023 9  Not Estab. % Final    Eosinophils 11/01/2023 4  Not Estab. % Final    Basophils PCT 11/01/2023 1  Not Estab. % Final    Neutrophils (Absolute) 11/01/2023 4.4  1.4 - 7.0 x10E3/uL Final    Lymphocytes (Absolute) 11/01/2023 1.4  0.7 - 3.1 x10E3/uL Final    Monocytes (Absolute) 11/01/2023 0.6  0.1 - 0.9 x10E3/uL Final    Eosinophils (Absolute) 11/01/2023 0.2  0.0 - 0.4  "x10E3/uL Final    Basophils ABS 11/01/2023 0.1  0.0 - 0.2 x10E3/uL Final    Immature Granulocytes 11/01/2023 0  Not Estab. % Final    Immature Granulocytes (Absolute) 11/01/2023 0.0  0.0 - 0.1 x10E3/uL Final   There may be more visits with results that are not included.             ___________________________________    History Review: The following portions of the patient's history were reviewed and updated as appropriate: allergies, current medications, past family history, past medical history, past social history, past surgical history, and problem list.    Unchanged information from this writer's previous assessment is copied and italicized; information that has changed is bolded.    Past Psychiatric History:      Past psychiatric diagnoses:   Schizophrenia, schizoaffective d/o, anxiety, depression   Inpatient psychiatric admissions:   10 times total (4 were involuntary)  First - age 22 after stealing sisters klonopin  Most common reason: first few were \"no reason\"  2017 - got invega shot was a very influential moment  2 suicide attempts/SI: SI and wanting to jump off a roof (2021) a few months later after sleeping pills, tried to hang himself 2 years ago  Psychosis 3 times (most recent for that reason was 3 years)  Most recent was 2 years ago  Has stayed out of the hospital due to \"taking his medications, not having suicidal ideation\"  Denies history of ECT, denies having had ICM in past.   Prior outpatient psychiatric treatment:   Saw Chilo Ramsey for 3 years  Saw psychiatrist at [facility] for a year before that  Past/current psychotherapy:   Worked with a therapist at [facility] Masood, stopped seeing 1-2 years ago because he left the practice  History of suicidal attempts/gestures:   2: took sleeping pills (August 2021), tried to hang self two years  History of non-suicidal self-injurious behavior:   None  History of violence/aggressive behaviors:   None  Psychotropic medication trials:   Invega, (discomfort) " "risperdal, zyprexa, thorazine (all unknown reasons for discontinuation)  Abilify (bad reaction)  Invega SCHAEFER x1 in 2019 (\"hurt his head\")  Ativan ('caused psychosis')  Loxapine - feeling too tired on dosages above 20mg  Risperdal - above 2mg causes nausea and \"hurting\" in his head  Clozapine - current medication  Melatonin - current medication  Substance abuse inpatient/outpatient rehabilitation:   Rehab in 2017 - marijuana use  Eating disorder history:   No  Other services: used to have a  but reports he does not anymore because they ran out of things to do together      Substance Abuse History:     States he was smoking a lot of marijuana for 5 years, nearly every day, stopped smoking 2019 July after he \"freaked out\" and thought \"there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

## 2024-01-03 ENCOUNTER — OFFICE VISIT (OUTPATIENT)
Dept: PSYCHIATRY | Facility: CLINIC | Age: 29
End: 2024-01-03
Payer: COMMERCIAL

## 2024-01-03 DIAGNOSIS — F20.9 SCHIZOPHRENIA, UNSPECIFIED TYPE (HCC): Primary | ICD-10-CM

## 2024-01-03 DIAGNOSIS — Z79.899 LONG TERM CURRENT USE OF ANTIPSYCHOTIC MEDICATION: ICD-10-CM

## 2024-01-03 DIAGNOSIS — Z51.81 ENCOUNTER FOR MEDICATION MONITORING: ICD-10-CM

## 2024-01-03 DIAGNOSIS — K59.03 DRUG-INDUCED CONSTIPATION: ICD-10-CM

## 2024-01-03 LAB
BASOPHILS # BLD AUTO: 0.1 X10E3/UL (ref 0–0.2)
BASOPHILS NFR BLD AUTO: 1 %
EOSINOPHIL # BLD AUTO: 0.3 X10E3/UL (ref 0–0.4)
EOSINOPHIL NFR BLD AUTO: 3 %
ERYTHROCYTE [DISTWIDTH] IN BLOOD BY AUTOMATED COUNT: 13 % (ref 11.6–15.4)
HCT VFR BLD AUTO: 39 % (ref 37.5–51)
HGB BLD-MCNC: 13.5 G/DL (ref 13–17.7)
IMM GRANULOCYTES # BLD: 0 X10E3/UL (ref 0–0.1)
IMM GRANULOCYTES NFR BLD: 0 %
LYMPHOCYTES # BLD AUTO: 1.8 X10E3/UL (ref 0.7–3.1)
LYMPHOCYTES NFR BLD AUTO: 24 %
MCH RBC QN AUTO: 29.7 PG (ref 26.6–33)
MCHC RBC AUTO-ENTMCNC: 34.6 G/DL (ref 31.5–35.7)
MCV RBC AUTO: 86 FL (ref 79–97)
MONOCYTES # BLD AUTO: 0.7 X10E3/UL (ref 0.1–0.9)
MONOCYTES NFR BLD AUTO: 9 %
NEUTROPHILS # BLD AUTO: 4.7 X10E3/UL (ref 1.4–7)
NEUTROPHILS NFR BLD AUTO: 63 %
PLATELET # BLD AUTO: 263 X10E3/UL (ref 150–450)
RBC # BLD AUTO: 4.54 X10E6/UL (ref 4.14–5.8)
WBC # BLD AUTO: 7.6 X10E3/UL (ref 3.4–10.8)

## 2024-01-03 PROCEDURE — 99214 OFFICE O/P EST MOD 30 MIN: CPT | Performed by: STUDENT IN AN ORGANIZED HEALTH CARE EDUCATION/TRAINING PROGRAM

## 2024-01-03 RX ORDER — SENNOSIDES 8.6 MG
8.6 TABLET ORAL
Qty: 30 TABLET | Refills: 1 | Status: SHIPPED | OUTPATIENT
Start: 2024-01-03

## 2024-01-03 RX ORDER — CLOZAPINE 100 MG/1
100 TABLET ORAL
Qty: 30 TABLET | Refills: 1 | Status: SHIPPED | OUTPATIENT
Start: 2024-01-03 | End: 2024-03-03

## 2024-01-03 NOTE — PATIENT INSTRUCTIONS
Based on discussion today we will be continuing current dosages but change the timing of clozapine to the evening.     Leigha Oliveira ( A behavioral health navigator) will be calling to discuss available resources and programs

## 2024-01-05 DIAGNOSIS — Z13.21 SCREENING FOR ENDOCRINE, NUTRITIONAL, METABOLIC AND IMMUNITY DISORDER: Primary | ICD-10-CM

## 2024-01-05 DIAGNOSIS — Z79.899 LONG TERM CURRENT USE OF ANTIPSYCHOTIC MEDICATION: ICD-10-CM

## 2024-01-05 DIAGNOSIS — Z13.29 SCREENING FOR ENDOCRINE, NUTRITIONAL, METABOLIC AND IMMUNITY DISORDER: Primary | ICD-10-CM

## 2024-01-05 DIAGNOSIS — Z13.228 SCREENING FOR ENDOCRINE, NUTRITIONAL, METABOLIC AND IMMUNITY DISORDER: Primary | ICD-10-CM

## 2024-01-05 DIAGNOSIS — Z13.0 SCREENING FOR ENDOCRINE, NUTRITIONAL, METABOLIC AND IMMUNITY DISORDER: Primary | ICD-10-CM

## 2024-01-08 LAB
BASOPHILS # BLD AUTO: 0.1 X10E3/UL (ref 0–0.2)
BASOPHILS NFR BLD AUTO: 1 %
EOSINOPHIL # BLD AUTO: 0.3 X10E3/UL (ref 0–0.4)
EOSINOPHIL NFR BLD AUTO: 4 %
ERYTHROCYTE [DISTWIDTH] IN BLOOD BY AUTOMATED COUNT: 13.2 % (ref 11.6–15.4)
HCT VFR BLD AUTO: 41.7 % (ref 37.5–51)
HGB BLD-MCNC: 13.9 G/DL (ref 13–17.7)
IMM GRANULOCYTES # BLD: 0 X10E3/UL (ref 0–0.1)
IMM GRANULOCYTES NFR BLD: 0 %
LYMPHOCYTES # BLD AUTO: 1.9 X10E3/UL (ref 0.7–3.1)
LYMPHOCYTES NFR BLD AUTO: 26 %
MCH RBC QN AUTO: 29.1 PG (ref 26.6–33)
MCHC RBC AUTO-ENTMCNC: 33.3 G/DL (ref 31.5–35.7)
MCV RBC AUTO: 87 FL (ref 79–97)
MONOCYTES # BLD AUTO: 0.7 X10E3/UL (ref 0.1–0.9)
MONOCYTES NFR BLD AUTO: 9 %
NEUTROPHILS # BLD AUTO: 4.4 X10E3/UL (ref 1.4–7)
NEUTROPHILS NFR BLD AUTO: 60 %
PLATELET # BLD AUTO: 274 X10E3/UL (ref 150–450)
RBC # BLD AUTO: 4.78 X10E6/UL (ref 4.14–5.8)
WBC # BLD AUTO: 7.2 X10E3/UL (ref 3.4–10.8)

## 2024-01-11 ENCOUNTER — TELEPHONE (OUTPATIENT)
Dept: PSYCHIATRY | Facility: CLINIC | Age: 29
End: 2024-01-11

## 2024-01-11 NOTE — TELEPHONE ENCOUNTER
Intake received an email from Behavioral Health Navigator stating Dr Stockton would like to have patient scheduled with therapy. Pt has been added to the Pending List when a therapist becomes available.

## 2024-01-15 LAB
BASOPHILS # BLD AUTO: 0.1 X10E3/UL (ref 0–0.2)
BASOPHILS NFR BLD AUTO: 1 %
EOSINOPHIL # BLD AUTO: 0.3 X10E3/UL (ref 0–0.4)
EOSINOPHIL NFR BLD AUTO: 4 %
ERYTHROCYTE [DISTWIDTH] IN BLOOD BY AUTOMATED COUNT: 13.2 % (ref 11.6–15.4)
HCT VFR BLD AUTO: 39.9 % (ref 37.5–51)
HGB BLD-MCNC: 13.9 G/DL (ref 13–17.7)
IMM GRANULOCYTES # BLD: 0 X10E3/UL (ref 0–0.1)
IMM GRANULOCYTES NFR BLD: 0 %
LYMPHOCYTES # BLD AUTO: 1.9 X10E3/UL (ref 0.7–3.1)
LYMPHOCYTES NFR BLD AUTO: 24 %
MCH RBC QN AUTO: 29.7 PG (ref 26.6–33)
MCHC RBC AUTO-ENTMCNC: 34.8 G/DL (ref 31.5–35.7)
MCV RBC AUTO: 85 FL (ref 79–97)
MONOCYTES # BLD AUTO: 0.8 X10E3/UL (ref 0.1–0.9)
MONOCYTES NFR BLD AUTO: 9 %
NEUTROPHILS # BLD AUTO: 4.9 X10E3/UL (ref 1.4–7)
NEUTROPHILS NFR BLD AUTO: 62 %
PLATELET # BLD AUTO: 307 X10E3/UL (ref 150–450)
RBC # BLD AUTO: 4.68 X10E6/UL (ref 4.14–5.8)
WBC # BLD AUTO: 8 X10E3/UL (ref 3.4–10.8)

## 2024-01-18 ENCOUNTER — TELEPHONE (OUTPATIENT)
Dept: PSYCHIATRY | Facility: CLINIC | Age: 29
End: 2024-01-18

## 2024-01-23 LAB
ALBUMIN SERPL-MCNC: 4.4 G/DL (ref 4.3–5.2)
ALBUMIN/GLOB SERPL: 1.8 {RATIO} (ref 1.2–2.2)
ALP SERPL-CCNC: 96 IU/L (ref 44–121)
ALT SERPL-CCNC: 29 IU/L (ref 0–44)
AST SERPL-CCNC: 24 IU/L (ref 0–40)
BILIRUB SERPL-MCNC: 0.3 MG/DL (ref 0–1.2)
BUN SERPL-MCNC: 12 MG/DL (ref 6–20)
BUN/CREAT SERPL: 14 (ref 9–20)
CALCIUM SERPL-MCNC: 9.4 MG/DL (ref 8.7–10.2)
CHLORIDE SERPL-SCNC: 104 MMOL/L (ref 96–106)
CHOLEST SERPL-MCNC: 194 MG/DL (ref 100–199)
CO2 SERPL-SCNC: 24 MMOL/L (ref 20–29)
CREAT SERPL-MCNC: 0.84 MG/DL (ref 0.76–1.27)
EGFR: 123 ML/MIN/1.73
GLOBULIN SER-MCNC: 2.5 G/DL (ref 1.5–4.5)
GLUCOSE SERPL-MCNC: 91 MG/DL (ref 70–99)
HDLC SERPL-MCNC: 32 MG/DL
LDLC SERPL CALC-MCNC: 128 MG/DL (ref 0–99)
LDLC/HDLC SERPL: 4 RATIO (ref 0–3.6)
POTASSIUM SERPL-SCNC: 4.2 MMOL/L (ref 3.5–5.2)
PROT SERPL-MCNC: 6.9 G/DL (ref 6–8.5)
SL AMB VLDL CHOLESTEROL CALC: 34 MG/DL (ref 5–40)
SODIUM SERPL-SCNC: 140 MMOL/L (ref 134–144)
TRIGL SERPL-MCNC: 190 MG/DL (ref 0–149)

## 2024-01-24 DIAGNOSIS — Z79.899 LONG TERM CURRENT USE OF ANTIPSYCHOTIC MEDICATION: Primary | ICD-10-CM

## 2024-01-24 DIAGNOSIS — Z13.228 SCREENING FOR ENDOCRINE, NUTRITIONAL, METABOLIC AND IMMUNITY DISORDER: ICD-10-CM

## 2024-01-24 DIAGNOSIS — Z13.0 SCREENING FOR ENDOCRINE, NUTRITIONAL, METABOLIC AND IMMUNITY DISORDER: ICD-10-CM

## 2024-01-24 DIAGNOSIS — Z13.29 SCREENING FOR ENDOCRINE, NUTRITIONAL, METABOLIC AND IMMUNITY DISORDER: ICD-10-CM

## 2024-01-24 DIAGNOSIS — Z13.21 SCREENING FOR ENDOCRINE, NUTRITIONAL, METABOLIC AND IMMUNITY DISORDER: ICD-10-CM

## 2024-01-29 LAB
BASOPHILS # BLD AUTO: 0.1 X10E3/UL (ref 0–0.2)
BASOPHILS NFR BLD AUTO: 1 %
EOSINOPHIL # BLD AUTO: 0.2 X10E3/UL (ref 0–0.4)
EOSINOPHIL NFR BLD AUTO: 3 %
ERYTHROCYTE [DISTWIDTH] IN BLOOD BY AUTOMATED COUNT: 13.1 % (ref 11.6–15.4)
EST. AVERAGE GLUCOSE BLD GHB EST-MCNC: 105 MG/DL
HBA1C MFR BLD: 5.3 % (ref 4.8–5.6)
HCT VFR BLD AUTO: 40.7 % (ref 37.5–51)
HGB BLD-MCNC: 13.6 G/DL (ref 13–17.7)
IMM GRANULOCYTES # BLD: 0 X10E3/UL (ref 0–0.1)
IMM GRANULOCYTES NFR BLD: 1 %
LYMPHOCYTES # BLD AUTO: 1.6 X10E3/UL (ref 0.7–3.1)
LYMPHOCYTES NFR BLD AUTO: 20 %
MCH RBC QN AUTO: 28.9 PG (ref 26.6–33)
MCHC RBC AUTO-ENTMCNC: 33.4 G/DL (ref 31.5–35.7)
MCV RBC AUTO: 87 FL (ref 79–97)
MONOCYTES # BLD AUTO: 0.7 X10E3/UL (ref 0.1–0.9)
MONOCYTES NFR BLD AUTO: 8 %
NEUTROPHILS # BLD AUTO: 5.6 X10E3/UL (ref 1.4–7)
NEUTROPHILS NFR BLD AUTO: 67 %
PLATELET # BLD AUTO: 290 X10E3/UL (ref 150–450)
RBC # BLD AUTO: 4.7 X10E6/UL (ref 4.14–5.8)
WBC # BLD AUTO: 8.2 X10E3/UL (ref 3.4–10.8)

## 2024-02-05 NOTE — PSYCH
"MEDICATION MANAGEMENT NOTE      [facility]: [facility]   Mental Health Outpatient Services   [address] [place] [id number] [phone]    Name and Date of Birth:  Tex Hill 28 y.o. 1995 MRN: [id number]    Date of Visit: February 7, 2024    Reason for Visit: Follow-up visit regarding medication management     _____________________________    Assessment/Plan   Tex Hill is a 28 y.o. male, Single, domiciled with mother, on permanent disability, with past medical history of Vitamin D deficiency, prior psychiatric diagnoses of schizoaffective disorder; multiple hospitalizations and two suicide attempts (most recently 2021).  Tex was personally seen and evaluated today at the [facility] outpatient clinic for follow-up regarding medication management.       On assessment, Tex Hill reports he is doing \"alright.\" He has been adherent to his current medication regimen without issues. He reports more instances of distressing thoughts and instances of reliving old conversations with his neighbor again. He is open to trying a higher dosage of clozapine to address this, which recurred after loxapine was stopped. We also discussed the possibility of decreasing risperdal to address polypharmacy in favor of antipsychotic monotherapy however he is declining this and prefers to stay on risperdal in addition to clozapine. In regards to his general health and recent weight gain on medications, we discussed lifestyle modifications, nutritionist referral, or initiating medication to help mitigate the metabolic side effects of clozapine, however Tex declines all intervention at this time. He has also declined working with behavioral health navigator but does plan to stay in touch if needs arise in the future. He reports difficulty with sleep quality and owuld like to try higher dosage of melatonin. At this time we will plan to increase both " clozapine and melatonin. Tex is in agreement with this plan and agrees to call the office with any questions, concerns, or side effects between appointments.    DSM-5 Diagnoses/Visit Diagnoses:     1. Schizophrenia, unspecified type (HCC)    2. Disrupted sleep-wake cycle        Treatment Recommendations/Precautions:  Continue psychopharmacological management as follows:  Continue risperdal 2mg HS for psychosis  Increase Clozapine 125mg HS (pt taking in AM), for psychosis  Continue weekly CBC with differential per [facility] program (transition to every other week around April 25th)   Most recent ANC = 4.1 on 2/5/24  Clozapine level = 70 (low) on 12/11 on 100mg dosage  Continue vitamin D supplementation for deficiency  Continue daily sennakot for clozapine induced constipation  Increase Melatonin 3mg HS PRN for sleep-wake cycle regulation  Labs most recently obtained January 2024, reviewed.  (Lipid panel, CMP, Ha1c, and weekly CBC)  Declined services after speaking to behavioral health navigator  Declining to schedule MRI  On waiting list for individual psychotherapy  Recommending nutritional and lifestyle changes, offer to refer to nutritionist declined   Follow up in 4-5 weeks for medication management  Follow up with PCP for medical issues and ongoing care  Aware of 24 hour and weekend coverage for urgent situations accessed by calling [facility] Out[facility] main practice number    Individual psychotherapy provided: No     Treatment Plan:     Completed and signed during the session: Not applicable - Treatment Plan not due at this session    Medications Risks/Benefits:      Risks, Benefits And Possible Side Effects Of Medications:    Risks, benefits, and possible side effects of medications explained to Tex and he verbalizes understanding and agreement for treatment.     Clozapine: PARQ was completed for second generation antipsychotic medication including sedation, GI distress,  "dizziness, risk of metabolic syndrome, EPS (akathisia, TD, etc), rare NMS, orthostatic hypotension, cardiovascular risks such as QT prolongation, increased prolactin, and others.      Controlled Medication Discussion:     Not applicable    Medical Decision Making / Counseling / Coordination of Care:  The following interventions are recommended: return in 1 month for follow up.  Although patient's acute lethality risk is LOW, long-term/chronic lethality risk is mildly elevated given the risk factors listed above. However, at the current moment, Tex is future-oriented, forward-thinking, and demonstrates ability to act in a self-preserving manner as evidenced by volitionally seeking psychiatric evaluation and treatment today. To mitigate future risk, patient should adhere to treatment recommendations, avoid alcohol/illicit substance use, utilize community-based resources and familiar support, and prioritize mental health treatment. The diagnosis and treatment plan were reviewed with the patient. Risks, benefits, and alternatives to treatment were discussed. The importance of medication and treatment compliance was reviewed with the patient.     _____________________________________________    History of Present Illness     Chief Complaint: \"I'm a little frustrated with hearing my neighbor's speech from years ago\"    SUBJECTIVE:    Tex Hill is a 28 y.o. male, possessing a past psychiatric history significant for schizophrenia, who was personally seen and evaluated at the [facility] outpatient clinic for follow-up regarding medication management.  At their last visit, the plan was to continue medications and weekly labs. Since most recent appointment, eTx's mother called to report he began talking to himself again in mid-January.     Tex states that since their previous psychiatric appointment with this writer, he's a little frustrated. He reports he is frustrated with the speech of his " neighbor in his ear, not responding as much but had instance of talking to self. He has a housing appointment on March 6th and is unsure how he feels abut it, some concerns about ability to afford it without work or without roommate. Not having problems with medications. Today he is reporting he is thinking more about his neighbor, hearing their old conversations, worried about past events, including discussing his concerns about the rapper situation. Discussed that these thoughts had been under better control prior to stopping loxapine, and that if we increase clozapine they will likely be under better control again. Melatonin not quite effective enough, would like higher dosage. He does not have anything else he would like to discuss today, and is comfortable with current plan.    Presently, patient denies suicidal/homicidal ideation in addition to thoughts of self-injury.  At conclusion of evaluation, patient is amenable to the recommendations of this writer including: continue psychotropic medications as prescribed.  Also, patient is amenable to calling/contacting the outpatient office including this writer if any acute adverse effects of their medication regimen arise in addition to any comments or concerns pertaining to their psychiatric management.  Patient is amenable to calling/contacting crisis and/or attending to the nearest emergency department if their clinical condition deteriorates to assure their safety and stability, stating that they are able to appropriately confide in their mother regarding their psychiatric state.    Current Rating Scores:     None completed today.    Psychiatric Review Of Systems:  Unchanged information from this writer's previous assessment is copied and italicized; information that has changed is bolded.    Appetite: increased  Adverse eating: no  Weight changes: leveled out     Insomnia/sleeplessness: a little better but not great  Fatigue/anergy: low  Anhedonia/lack of  interest: stable  Attention/concentration: improving  Psychomotor agitation/retardation: no  Somatic symptoms: no  Anxiety/panic attack: denies  Mylene/hypomania: no  Hopelessness/helplessness/worthlessness: no  Self-injurious behavior/high-risk behavior: no  Suicidal ideation: no  Homicidal ideation: no  Auditory hallucinations: denies  Visual hallucinations: no  Other perceptual disturbances: no  Delusional thinking: no  Obsessive/compulsive symptoms: no    Review Of Systems:      Constitutional negative   ENT negative   Cardiovascular negative   Respiratory negative   Gastrointestinal negative   Genitourinary negative   Musculoskeletal negative   Integumentary negative   Neurological negative   Endocrine negative   Other Symptoms none, all other systems are negative     Objective    OBJECTIVE:     Visit Vitals  Smoking Status Never      Wt Readings from Last 6 Encounters:   No data found for Wt        History reviewed. No pertinent past medical history.   History reviewed. No pertinent surgical history.    Meds/Allergies    Allergies   Allergen Reactions    Gantrisin [Sulfisoxazole] Hallucinations     Took as a child and had hallucinations per mother     Current Outpatient Medications   Medication Instructions    b complex vitamins capsule 1 capsule, Oral, Daily    cloZAPine (CLOZARIL) 100 mg tablet Take 1 tablet (100 mg) by mouth at bedtime with 25mg tablet for a total of 125 mg    cloZAPine (CLOZARIL) 25 mg tablet Take 1 tablet (25 mg) by mouth at bedtime with 100mg tablet for a total of 125 mg    melatonin 3 mg, Oral, Daily at bedtime PRN    risperiDONE (RISPERDAL) 2 mg, Oral, Daily at bedtime    senna (SENOKOT) 8.6 mg, Oral, Daily at bedtime    Vitamin D (Cholecalciferol) 400 Units, Oral, Daily           Mental Status Exam:    Appearance age appropriate, casually dressed, wearing winter coat, limited eye contact, seated comfortably   Behavior cooperative, calm   Speech normal rate, normal volume, normal pitch  "  Mood \"Fine\"   Affect constricted   Thought Processes organized, goal directed   Associations intact associations   Thought Content no overt delusions   Perceptual Disturbances: Reports hearing neighbor conversation from the past, and Does not appear to be responding to internal stimuli   Abnormal Thoughts  Risk Potential Denies suicidal or homicidal ideation, plan, or intent   Orientation oriented to person, place, time/date, and situation   Memory recent and remote memory grossly intact   Consciousness alert and awake   Attention Span Concentration Span attention span and concentration are age appropriate   Intellect appears to be of average intelligence   Insight fair   Judgement fair   Muscle Strength and  Gait normal muscle strength and normal muscle tone, normal gait and normal balance   Motor Activity no abnormal movements   Language no difficulty naming common objects, no difficulty repeating a phrase, no difficulty writing a sentence   Fund of Knowledge adequate knowledge of current events  adequate fund of knowledge regarding past history  adequate fund of knowledge regarding vocabulary      Laboratory Results: I have personally reviewed all pertinent laboratory/tests results    Orders Only on 02/05/2024   Component Date Value Ref Range Status    White Blood Cell Count 02/05/2024 6.9  3.4 - 10.8 x10E3/uL Final    Red Blood Cell Count 02/05/2024 4.72  4.14 - 5.80 x10E6/uL Final    Hemoglobin 02/05/2024 13.8  13.0 - 17.7 g/dL Final    HCT 02/05/2024 40.3  37.5 - 51.0 % Final    MCV 02/05/2024 85  79 - 97 fL Final    MCH 02/05/2024 29.2  26.6 - 33.0 pg Final    MCHC 02/05/2024 34.2  31.5 - 35.7 g/dL Final    RDW 02/05/2024 13.1  11.6 - 15.4 % Final    Platelet Count 02/05/2024 303  150 - 450 x10E3/uL Final    Neutrophils 02/05/2024 60  Not Estab. % Final    Lymphocytes 02/05/2024 25  Not Estab. % Final    Monocytes 02/05/2024 10  Not Estab. % Final    Eosinophils 02/05/2024 4  Not Estab. % Final    Basophils " PCT 02/05/2024 1  Not Estab. % Final    Neutrophils (Absolute) 02/05/2024 4.1  1.4 - 7.0 x10E3/uL Final    Lymphocytes (Absolute) 02/05/2024 1.7  0.7 - 3.1 x10E3/uL Final    Monocytes (Absolute) 02/05/2024 0.7  0.1 - 0.9 x10E3/uL Final    Eosinophils (Absolute) 02/05/2024 0.2  0.0 - 0.4 x10E3/uL Final    Basophils ABS 02/05/2024 0.1  0.0 - 0.2 x10E3/uL Final    Immature Granulocytes 02/05/2024 0  Not Estab. % Final    Immature Granulocytes (Absolute) 02/05/2024 0.0  0.0 - 0.1 x10E3/uL Final   Orders Only on 01/29/2024   Component Date Value Ref Range Status    White Blood Cell Count 01/29/2024 8.2  3.4 - 10.8 x10E3/uL Final    Red Blood Cell Count 01/29/2024 4.70  4.14 - 5.80 x10E6/uL Final    Hemoglobin 01/29/2024 13.6  13.0 - 17.7 g/dL Final    HCT 01/29/2024 40.7  37.5 - 51.0 % Final    MCV 01/29/2024 87  79 - 97 fL Final    MCH 01/29/2024 28.9  26.6 - 33.0 pg Final    MCHC 01/29/2024 33.4  31.5 - 35.7 g/dL Final    RDW 01/29/2024 13.1  11.6 - 15.4 % Final    Platelet Count 01/29/2024 290  150 - 450 x10E3/uL Final    Neutrophils 01/29/2024 67  Not Estab. % Final    Lymphocytes 01/29/2024 20  Not Estab. % Final    Monocytes 01/29/2024 8  Not Estab. % Final    Eosinophils 01/29/2024 3  Not Estab. % Final    Basophils PCT 01/29/2024 1  Not Estab. % Final    Neutrophils (Absolute) 01/29/2024 5.6  1.4 - 7.0 x10E3/uL Final    Lymphocytes (Absolute) 01/29/2024 1.6  0.7 - 3.1 x10E3/uL Final    Monocytes (Absolute) 01/29/2024 0.7  0.1 - 0.9 x10E3/uL Final    Eosinophils (Absolute) 01/29/2024 0.2  0.0 - 0.4 x10E3/uL Final    Basophils ABS 01/29/2024 0.1  0.0 - 0.2 x10E3/uL Final    Immature Granulocytes 01/29/2024 1  Not Estab. % Final    Immature Granulocytes (Absolute) 01/29/2024 0.0  0.0 - 0.1 x10E3/uL Final   Orders Only on 01/24/2024   Component Date Value Ref Range Status    Hemoglobin A1C 01/29/2024 5.3  4.8 - 5.6 % Final    Comment:          Prediabetes: 5.7 - 6.4           Diabetes: >6.4           Glycemic control  for adults with diabetes: <7.0      Estimated Average Glucose 01/29/2024 105  mg/dL Final   Ancillary Orders on 01/19/2024   Component Date Value Ref Range Status    White Blood Cell Count 01/22/2024 7.1  3.4 - 10.8 x10E3/uL Final    Red Blood Cell Count 01/22/2024 4.59  4.14 - 5.80 x10E6/uL Final    Hemoglobin 01/22/2024 13.3  13.0 - 17.7 g/dL Final    HCT 01/22/2024 40.0  37.5 - 51.0 % Final    MCV 01/22/2024 87  79 - 97 fL Final    MCH 01/22/2024 29.0  26.6 - 33.0 pg Final    MCHC 01/22/2024 33.3  31.5 - 35.7 g/dL Final    RDW 01/22/2024 13.3  11.6 - 15.4 % Final    Platelet Count 01/22/2024 282  150 - 450 x10E3/uL Final    Neutrophils 01/22/2024 63  Not Estab. % Final    Lymphocytes 01/22/2024 23  Not Estab. % Final    Monocytes 01/22/2024 9  Not Estab. % Final    Eosinophils 01/22/2024 4  Not Estab. % Final    Basophils PCT 01/22/2024 1  Not Estab. % Final    Neutrophils (Absolute) 01/22/2024 4.5  1.4 - 7.0 x10E3/uL Final    Lymphocytes (Absolute) 01/22/2024 1.6  0.7 - 3.1 x10E3/uL Final    Monocytes (Absolute) 01/22/2024 0.6  0.1 - 0.9 x10E3/uL Final    Eosinophils (Absolute) 01/22/2024 0.3  0.0 - 0.4 x10E3/uL Final    Basophils ABS 01/22/2024 0.1  0.0 - 0.2 x10E3/uL Final    Immature Granulocytes 01/22/2024 0  Not Estab. % Final    Immature Granulocytes (Absolute) 01/22/2024 0.0  0.0 - 0.1 x10E3/uL Final   Orders Only on 01/15/2024   Component Date Value Ref Range Status    White Blood Cell Count 01/15/2024 8.0  3.4 - 10.8 x10E3/uL Final    Red Blood Cell Count 01/15/2024 4.68  4.14 - 5.80 x10E6/uL Final    Hemoglobin 01/15/2024 13.9  13.0 - 17.7 g/dL Final    HCT 01/15/2024 39.9  37.5 - 51.0 % Final    MCV 01/15/2024 85  79 - 97 fL Final    MCH 01/15/2024 29.7  26.6 - 33.0 pg Final    MCHC 01/15/2024 34.8  31.5 - 35.7 g/dL Final    RDW 01/15/2024 13.2  11.6 - 15.4 % Final    Platelet Count 01/15/2024 307  150 - 450 x10E3/uL Final    Neutrophils 01/15/2024 62  Not Estab. % Final    Lymphocytes 01/15/2024 24   Not Estab. % Final    Monocytes 01/15/2024 9  Not Estab. % Final    Eosinophils 01/15/2024 4  Not Estab. % Final    Basophils PCT 01/15/2024 1  Not Estab. % Final    Neutrophils (Absolute) 01/15/2024 4.9  1.4 - 7.0 x10E3/uL Final    Lymphocytes (Absolute) 01/15/2024 1.9  0.7 - 3.1 x10E3/uL Final    Monocytes (Absolute) 01/15/2024 0.8  0.1 - 0.9 x10E3/uL Final    Eosinophils (Absolute) 01/15/2024 0.3  0.0 - 0.4 x10E3/uL Final    Basophils ABS 01/15/2024 0.1  0.0 - 0.2 x10E3/uL Final    Immature Granulocytes 01/15/2024 0  Not Estab. % Final    Immature Granulocytes (Absolute) 01/15/2024 0.0  0.0 - 0.1 x10E3/uL Final   Orders Only on 01/08/2024   Component Date Value Ref Range Status    White Blood Cell Count 01/08/2024 7.2  3.4 - 10.8 x10E3/uL Final    Red Blood Cell Count 01/08/2024 4.78  4.14 - 5.80 x10E6/uL Final    Hemoglobin 01/08/2024 13.9  13.0 - 17.7 g/dL Final    HCT 01/08/2024 41.7  37.5 - 51.0 % Final    MCV 01/08/2024 87  79 - 97 fL Final    MCH 01/08/2024 29.1  26.6 - 33.0 pg Final    MCHC 01/08/2024 33.3  31.5 - 35.7 g/dL Final    RDW 01/08/2024 13.2  11.6 - 15.4 % Final    Platelet Count 01/08/2024 274  150 - 450 x10E3/uL Final    Neutrophils 01/08/2024 60  Not Estab. % Final    Lymphocytes 01/08/2024 26  Not Estab. % Final    Monocytes 01/08/2024 9  Not Estab. % Final    Eosinophils 01/08/2024 4  Not Estab. % Final    Basophils PCT 01/08/2024 1  Not Estab. % Final    Neutrophils (Absolute) 01/08/2024 4.4  1.4 - 7.0 x10E3/uL Final    Lymphocytes (Absolute) 01/08/2024 1.9  0.7 - 3.1 x10E3/uL Final    Monocytes (Absolute) 01/08/2024 0.7  0.1 - 0.9 x10E3/uL Final    Eosinophils (Absolute) 01/08/2024 0.3  0.0 - 0.4 x10E3/uL Final    Basophils ABS 01/08/2024 0.1  0.0 - 0.2 x10E3/uL Final    Immature Granulocytes 01/08/2024 0  Not Estab. % Final    Immature Granulocytes (Absolute) 01/08/2024 0.0  0.0 - 0.1 x10E3/uL Final   Orders Only on 01/05/2024   Component Date Value Ref Range Status    Glucose, Random  01/22/2024 91  70 - 99 mg/dL Final    BUN 01/22/2024 12  6 - 20 mg/dL Final    Creatinine 01/22/2024 0.84  0.76 - 1.27 mg/dL Final    eGFR 01/22/2024 123  >59 mL/min/1.73 Final    SL AMB BUN/CREATININE RATIO 01/22/2024 14  9 - 20 Final    Sodium 01/22/2024 140  134 - 144 mmol/L Final    Potassium 01/22/2024 4.2  3.5 - 5.2 mmol/L Final    Chloride 01/22/2024 104  96 - 106 mmol/L Final    CO2 01/22/2024 24  20 - 29 mmol/L Final    CALCIUM 01/22/2024 9.4  8.7 - 10.2 mg/dL Final    Protein, Total 01/22/2024 6.9  6.0 - 8.5 g/dL Final    Albumin 01/22/2024 4.4  4.3 - 5.2 g/dL Final    Globulin, Total 01/22/2024 2.5  1.5 - 4.5 g/dL Final    Albumin/Globulin Ratio 01/22/2024 1.8  1.2 - 2.2 Final    TOTAL BILIRUBIN 01/22/2024 0.3  0.0 - 1.2 mg/dL Final    Alk Phos Isoenzymes 01/22/2024 96  44 - 121 IU/L Final    AST 01/22/2024 24  0 - 40 IU/L Final    ALT 01/22/2024 29  0 - 44 IU/L Final    Cholesterol, Total 01/22/2024 194  100 - 199 mg/dL Final    Triglycerides 01/22/2024 190 (H)  0 - 149 mg/dL Final    HDL 01/22/2024 32 (L)  >39 mg/dL Final    VLDL Cholesterol Calculated 01/22/2024 34  5 - 40 mg/dL Final    LDL Calculated 01/22/2024 128 (H)  0 - 99 mg/dL Final    LDl/HDL Ratio 01/22/2024 4.0 (H)  0.0 - 3.6 ratio Final    Comment:                                     LDL/HDL Ratio                                              Men  Women                                1/2 Avg.Risk  1.0    1.5                                    Avg.Risk  3.6    3.2                                 2X Avg.Risk  6.2    5.0                                 3X Avg.Risk  8.0    6.1     Orders Only on 01/02/2024   Component Date Value Ref Range Status    White Blood Cell Count 01/02/2024 7.6  3.4 - 10.8 x10E3/uL Final    Red Blood Cell Count 01/02/2024 4.54  4.14 - 5.80 x10E6/uL Final    Hemoglobin 01/02/2024 13.5  13.0 - 17.7 g/dL Final    HCT 01/02/2024 39.0  37.5 - 51.0 % Final    MCV 01/02/2024 86  79 - 97 fL Final    MCH 01/02/2024 29.7  26.6 -  33.0 pg Final    MCHC 01/02/2024 34.6  31.5 - 35.7 g/dL Final    RDW 01/02/2024 13.0  11.6 - 15.4 % Final    Platelet Count 01/02/2024 263  150 - 450 x10E3/uL Final    Neutrophils 01/02/2024 63  Not Estab. % Final    Lymphocytes 01/02/2024 24  Not Estab. % Final    Monocytes 01/02/2024 9  Not Estab. % Final    Eosinophils 01/02/2024 3  Not Estab. % Final    Basophils PCT 01/02/2024 1  Not Estab. % Final    Neutrophils (Absolute) 01/02/2024 4.7  1.4 - 7.0 x10E3/uL Final    Lymphocytes (Absolute) 01/02/2024 1.8  0.7 - 3.1 x10E3/uL Final    Monocytes (Absolute) 01/02/2024 0.7  0.1 - 0.9 x10E3/uL Final    Eosinophils (Absolute) 01/02/2024 0.3  0.0 - 0.4 x10E3/uL Final    Basophils ABS 01/02/2024 0.1  0.0 - 0.2 x10E3/uL Final    Immature Granulocytes 01/02/2024 0  Not Estab. % Final    Immature Granulocytes (Absolute) 01/02/2024 0.0  0.0 - 0.1 x10E3/uL Final   Orders Only on 12/26/2023   Component Date Value Ref Range Status    White Blood Cell Count 12/26/2023 5.9  3.4 - 10.8 x10E3/uL Final    Red Blood Cell Count 12/26/2023 4.59  4.14 - 5.80 x10E6/uL Final    Hemoglobin 12/26/2023 13.4  13.0 - 17.7 g/dL Final    HCT 12/26/2023 39.8  37.5 - 51.0 % Final    MCV 12/26/2023 87  79 - 97 fL Final    MCH 12/26/2023 29.2  26.6 - 33.0 pg Final    MCHC 12/26/2023 33.7  31.5 - 35.7 g/dL Final    RDW 12/26/2023 13.2  11.6 - 15.4 % Final    Platelet Count 12/26/2023 250  150 - 450 x10E3/uL Final    Neutrophils 12/26/2023 56  Not Estab. % Final    Lymphocytes 12/26/2023 29  Not Estab. % Final    Monocytes 12/26/2023 10  Not Estab. % Final    Eosinophils 12/26/2023 4  Not Estab. % Final    Basophils PCT 12/26/2023 1  Not Estab. % Final    Neutrophils (Absolute) 12/26/2023 3.3  1.4 - 7.0 x10E3/uL Final    Lymphocytes (Absolute) 12/26/2023 1.7  0.7 - 3.1 x10E3/uL Final    Monocytes (Absolute) 12/26/2023 0.6  0.1 - 0.9 x10E3/uL Final    Eosinophils (Absolute) 12/26/2023 0.3  0.0 - 0.4 x10E3/uL Final    Basophils ABS 12/26/2023 0.1  0.0  - 0.2 x10E3/uL Final    Immature Granulocytes 12/26/2023 0  Not Estab. % Final    Immature Granulocytes (Absolute) 12/26/2023 0.0  0.0 - 0.1 x10E3/uL Final   Ancillary Orders on 12/15/2023   Component Date Value Ref Range Status    White Blood Cell Count 12/18/2023 6.3  3.4 - 10.8 x10E3/uL Final    Red Blood Cell Count 12/18/2023 4.60  4.14 - 5.80 x10E6/uL Final    Hemoglobin 12/18/2023 13.3  13.0 - 17.7 g/dL Final    HCT 12/18/2023 40.6  37.5 - 51.0 % Final    MCV 12/18/2023 88  79 - 97 fL Final    MCH 12/18/2023 28.9  26.6 - 33.0 pg Final    MCHC 12/18/2023 32.8  31.5 - 35.7 g/dL Final    RDW 12/18/2023 13.1  11.6 - 15.4 % Final    Platelet Count 12/18/2023 252  150 - 450 x10E3/uL Final    Neutrophils 12/18/2023 61  Not Estab. % Final    Lymphocytes 12/18/2023 27  Not Estab. % Final    Monocytes 12/18/2023 7  Not Estab. % Final    Eosinophils 12/18/2023 4  Not Estab. % Final    Basophils PCT 12/18/2023 1  Not Estab. % Final    Neutrophils (Absolute) 12/18/2023 3.9  1.4 - 7.0 x10E3/uL Final    Lymphocytes (Absolute) 12/18/2023 1.7  0.7 - 3.1 x10E3/uL Final    Monocytes (Absolute) 12/18/2023 0.4  0.1 - 0.9 x10E3/uL Final    Eosinophils (Absolute) 12/18/2023 0.2  0.0 - 0.4 x10E3/uL Final    Basophils ABS 12/18/2023 0.1  0.0 - 0.2 x10E3/uL Final    Immature Granulocytes 12/18/2023 0  Not Estab. % Final    Immature Granulocytes (Absolute) 12/18/2023 0.0  0.0 - 0.1 x10E3/uL Final   There may be more visits with results that are not included.             ___________________________________    History Review: The following portions of the patient's history were reviewed and updated as appropriate: allergies, current medications, past family history, past medical history, past social history, past surgical history, and problem list.    Unchanged information from this writer's previous assessment is copied and italicized; information that has changed is bolded.    Past Psychiatric History:      Past psychiatric diagnoses:  "  Schizophrenia, schizoaffective d/o, anxiety, depression   Inpatient psychiatric admissions:   10 times total (4 were involuntary)  First - age 22 after stealing sisters klonopin  Most common reason: first few were \"no reason\"  2017 - got invega shot was a very influential moment  2 suicide attempts/SI: SI and wanting to jump off a roof (2021) a few months later after sleeping pills, tried to hang himself 2 years ago  Psychosis 3 times (most recent for that reason was 3 years)  Most recent was 2 years ago  Has stayed out of the hospital due to \"taking his medications, not having suicidal ideation\"  Denies history of ECT, denies having had ICM in past.   Prior outpatient psychiatric treatment:   Saw Chilo Ramsey for 3 years  Saw psychiatrist at [facility] for a year before that  Past/current psychotherapy:   Worked with a therapist at [facility], stopped seeing 1-2 years ago because he left the practice  History of suicidal attempts/gestures:   2: took sleeping pills (August 2021), tried to hang self two years  History of non-suicidal self-injurious behavior:   None  History of violence/aggressive behaviors:   None  Psychotropic medication trials:   Invega, (discomfort) risperdal, zyprexa, thorazine (all unknown reasons for discontinuation)  Abilify (bad reaction)  Invega SCHAEFER x1 in 2019 (\"hurt his head\")  Ativan ('caused psychosis')  Loxapine - feeling too tired on dosages above 20mg  Risperdal - above 2mg causes nausea and \"hurting\" in his head  Clozapine - current medication  Melatonin - current medication  Substance abuse inpatient/outpatient rehabilitation:   Rehab in 2017 - marijuana use  Eating disorder history:   No  Other services: used to have a  but reports he does not anymore because they ran out of things to do together      Substance Abuse History:     States he was smoking a lot of marijuana for 5 years, nearly every day, stopped smoking 2019 July after he \"freaked out\" and thought \"there were " "people in his basement and living in his house and torturing him. \"     Denies any other recreational drug use and otherwise denies history of alcohol, illict substance, or tobacco abuse., Patient denies previous legal actions or arrests related to substance intoxication including prior DWIs/DUIs., Patient does not exhibit objective evidence of substance withdrawal during today's examination nor do they appear under the influence of any psychoactive substance.       Family Psychiatric History:      Family History   History reviewed. No pertinent family history.         Psychiatric Family History: Per patient he is adopted,  Per mother is not adopted. Maternal grandfather - schizophrenia  Tex's half sister has bipolar disorder     Social History:     Developmental: Was in special education, denies IEP  Per care everywhere, mild developmental delay, early intervention at age 2.5. Concern for autism, diagnosed with ADHD.  Has 2 sister (50 and 34 years old), states he was adopted but his mother reports he is not adopted  Education: high school diploma/GED  Marital history: single  Children: none  Living arrangement, social support: Family is supportive, mom is supportive. Lives with mother, on a waiting list for housing, on disability for schizoaffective disorder.   Dad lives in [place]   Occupational History: Permanent disability. 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 , Patient denies history of arrests or violence with a deadly weapon.    history: None     Traumatic History:      Abuse: Denies  Other Traumatic Events: Bullying, and would not disclose further.  ___________________________________      Visit Time    Visit Start Time: 2:44  Visit Stop Time: 3:09  Total Visit Duration:  25 " minutes    Lauren Stockton MD   02/07/24

## 2024-02-06 DIAGNOSIS — F20.9 SCHIZOPHRENIA, UNSPECIFIED TYPE (HCC): ICD-10-CM

## 2024-02-06 LAB
BASOPHILS # BLD AUTO: 0.1 X10E3/UL (ref 0–0.2)
BASOPHILS NFR BLD AUTO: 1 %
EOSINOPHIL # BLD AUTO: 0.2 X10E3/UL (ref 0–0.4)
EOSINOPHIL NFR BLD AUTO: 4 %
ERYTHROCYTE [DISTWIDTH] IN BLOOD BY AUTOMATED COUNT: 13.1 % (ref 11.6–15.4)
HCT VFR BLD AUTO: 40.3 % (ref 37.5–51)
HGB BLD-MCNC: 13.8 G/DL (ref 13–17.7)
IMM GRANULOCYTES # BLD: 0 X10E3/UL (ref 0–0.1)
IMM GRANULOCYTES NFR BLD: 0 %
LYMPHOCYTES # BLD AUTO: 1.7 X10E3/UL (ref 0.7–3.1)
LYMPHOCYTES NFR BLD AUTO: 25 %
MCH RBC QN AUTO: 29.2 PG (ref 26.6–33)
MCHC RBC AUTO-ENTMCNC: 34.2 G/DL (ref 31.5–35.7)
MCV RBC AUTO: 85 FL (ref 79–97)
MONOCYTES # BLD AUTO: 0.7 X10E3/UL (ref 0.1–0.9)
MONOCYTES NFR BLD AUTO: 10 %
NEUTROPHILS # BLD AUTO: 4.1 X10E3/UL (ref 1.4–7)
NEUTROPHILS NFR BLD AUTO: 60 %
PLATELET # BLD AUTO: 303 X10E3/UL (ref 150–450)
RBC # BLD AUTO: 4.72 X10E6/UL (ref 4.14–5.8)
WBC # BLD AUTO: 6.9 X10E3/UL (ref 3.4–10.8)

## 2024-02-06 RX ORDER — RISPERIDONE 2 MG/1
2 TABLET ORAL
Qty: 30 TABLET | Refills: 1 | Status: CANCELLED | OUTPATIENT
Start: 2024-02-06

## 2024-02-06 NOTE — TELEPHONE ENCOUNTER
No the pharmacy asked for it though I did tell them that you might wait since he's being seen tomorrow

## 2024-02-07 ENCOUNTER — OFFICE VISIT (OUTPATIENT)
Dept: PSYCHIATRY | Facility: CLINIC | Age: 29
End: 2024-02-07

## 2024-02-07 DIAGNOSIS — G47.20 DISRUPTED SLEEP-WAKE CYCLE: ICD-10-CM

## 2024-02-07 DIAGNOSIS — F51.8 DISRUPTED SLEEP-WAKE CYCLE: ICD-10-CM

## 2024-02-07 DIAGNOSIS — F20.9 SCHIZOPHRENIA, UNSPECIFIED TYPE (HCC): Primary | ICD-10-CM

## 2024-02-07 RX ORDER — CLOZAPINE 100 MG/1
TABLET ORAL
Qty: 30 TABLET | Refills: 1 | Status: SHIPPED | OUTPATIENT
Start: 2024-02-07

## 2024-02-07 RX ORDER — RISPERIDONE 2 MG/1
2 TABLET ORAL
Qty: 30 TABLET | Refills: 1 | Status: SHIPPED | OUTPATIENT
Start: 2024-02-07

## 2024-02-07 RX ORDER — LANOLIN ALCOHOL/MO/W.PET/CERES
3 CREAM (GRAM) TOPICAL
Qty: 30 TABLET | Refills: 1 | Status: SHIPPED | OUTPATIENT
Start: 2024-02-07

## 2024-02-07 RX ORDER — CLOZAPINE 25 MG/1
TABLET ORAL
Qty: 30 TABLET | Refills: 1 | Status: SHIPPED | OUTPATIENT
Start: 2024-02-07

## 2024-02-07 NOTE — PATIENT INSTRUCTIONS
Today we are increasing clozapine to 125mg once per day, and increasing melatonin to 3mg at bedtime. Refills were sent to [facility]. Please call with any side effects or issues.      ---    Please call the office nursing staff for medication issues including refills, problems getting medications, bothersome side effects, etc at [phone].     Please return for a follow up appointment as discussed and arrive approximately 15 minutes prior to your appointment time. If you are running late or are unable to attend your appointment, please call our office at [phone]    If you have thoughts of harming yourself or are otherwise in psychological crisis, do not hesitate to contact your County Crisis hotline, or 911 or go to the nearest emergency room.  [facility] Crisis: [phone]  [facility] Crisis: [phone]  [facility] Crisis: [phone]  [facility] Crisis: [phone]  [facility] Crisis: [phone]  [facility] Crisis: [phone]  [facility] Crisis: [phone]  National Suicide Prevention Hotline: 1-665.405.9347 or call 267

## 2024-02-13 LAB
BASOPHILS # BLD AUTO: 0.1 X10E3/UL (ref 0–0.2)
BASOPHILS NFR BLD AUTO: 1 %
EOSINOPHIL # BLD AUTO: 0.2 X10E3/UL (ref 0–0.4)
EOSINOPHIL NFR BLD AUTO: 3 %
ERYTHROCYTE [DISTWIDTH] IN BLOOD BY AUTOMATED COUNT: 13 % (ref 11.6–15.4)
HCT VFR BLD AUTO: 42.4 % (ref 37.5–51)
HGB BLD-MCNC: 14 G/DL (ref 13–17.7)
IMM GRANULOCYTES # BLD: 0 X10E3/UL (ref 0–0.1)
IMM GRANULOCYTES NFR BLD: 0 %
LYMPHOCYTES # BLD AUTO: 1.8 X10E3/UL (ref 0.7–3.1)
LYMPHOCYTES NFR BLD AUTO: 24 %
MCH RBC QN AUTO: 28.9 PG (ref 26.6–33)
MCHC RBC AUTO-ENTMCNC: 33 G/DL (ref 31.5–35.7)
MCV RBC AUTO: 87 FL (ref 79–97)
MONOCYTES # BLD AUTO: 0.7 X10E3/UL (ref 0.1–0.9)
MONOCYTES NFR BLD AUTO: 9 %
NEUTROPHILS # BLD AUTO: 4.8 X10E3/UL (ref 1.4–7)
NEUTROPHILS NFR BLD AUTO: 63 %
PLATELET # BLD AUTO: 329 X10E3/UL (ref 150–450)
RBC # BLD AUTO: 4.85 X10E6/UL (ref 4.14–5.8)
WBC # BLD AUTO: 7.6 X10E3/UL (ref 3.4–10.8)

## 2024-02-19 LAB
BASOPHILS # BLD AUTO: 0.1 X10E3/UL (ref 0–0.2)
BASOPHILS NFR BLD AUTO: 1 %
EOSINOPHIL # BLD AUTO: 0.2 X10E3/UL (ref 0–0.4)
EOSINOPHIL NFR BLD AUTO: 3 %
ERYTHROCYTE [DISTWIDTH] IN BLOOD BY AUTOMATED COUNT: 12.9 % (ref 11.6–15.4)
HCT VFR BLD AUTO: 42.2 % (ref 37.5–51)
HGB BLD-MCNC: 14.3 G/DL (ref 13–17.7)
IMM GRANULOCYTES # BLD: 0 X10E3/UL (ref 0–0.1)
IMM GRANULOCYTES NFR BLD: 0 %
LYMPHOCYTES # BLD AUTO: 1.8 X10E3/UL (ref 0.7–3.1)
LYMPHOCYTES NFR BLD AUTO: 26 %
MCH RBC QN AUTO: 29.4 PG (ref 26.6–33)
MCHC RBC AUTO-ENTMCNC: 33.9 G/DL (ref 31.5–35.7)
MCV RBC AUTO: 87 FL (ref 79–97)
MONOCYTES # BLD AUTO: 0.7 X10E3/UL (ref 0.1–0.9)
MONOCYTES NFR BLD AUTO: 10 %
NEUTROPHILS # BLD AUTO: 4.3 X10E3/UL (ref 1.4–7)
NEUTROPHILS NFR BLD AUTO: 60 %
PLATELET # BLD AUTO: 302 X10E3/UL (ref 150–450)
RBC # BLD AUTO: 4.87 X10E6/UL (ref 4.14–5.8)
WBC # BLD AUTO: 7.1 X10E3/UL (ref 3.4–10.8)

## 2024-02-28 LAB
BASOPHILS # BLD AUTO: 0.1 X10E3/UL (ref 0–0.2)
BASOPHILS NFR BLD AUTO: 1 %
EOSINOPHIL # BLD AUTO: 0.3 X10E3/UL (ref 0–0.4)
EOSINOPHIL NFR BLD AUTO: 3 %
ERYTHROCYTE [DISTWIDTH] IN BLOOD BY AUTOMATED COUNT: 12.8 % (ref 11.6–15.4)
HCT VFR BLD AUTO: 39 % (ref 37.5–51)
HGB BLD-MCNC: 13.6 G/DL (ref 13–17.7)
IMM GRANULOCYTES # BLD: 0 X10E3/UL (ref 0–0.1)
IMM GRANULOCYTES NFR BLD: 0 %
LYMPHOCYTES # BLD AUTO: 1.7 X10E3/UL (ref 0.7–3.1)
LYMPHOCYTES NFR BLD AUTO: 19 %
MCH RBC QN AUTO: 29.5 PG (ref 26.6–33)
MCHC RBC AUTO-ENTMCNC: 34.9 G/DL (ref 31.5–35.7)
MCV RBC AUTO: 85 FL (ref 79–97)
MONOCYTES # BLD AUTO: 0.9 X10E3/UL (ref 0.1–0.9)
MONOCYTES NFR BLD AUTO: 10 %
NEUTROPHILS # BLD AUTO: 6 X10E3/UL (ref 1.4–7)
NEUTROPHILS NFR BLD AUTO: 67 %
PLATELET # BLD AUTO: 304 X10E3/UL (ref 150–450)
RBC # BLD AUTO: 4.61 X10E6/UL (ref 4.14–5.8)
WBC # BLD AUTO: 8.9 X10E3/UL (ref 3.4–10.8)

## 2024-03-04 LAB
BASOPHILS # BLD AUTO: 0.1 X10E3/UL (ref 0–0.2)
BASOPHILS NFR BLD AUTO: 1 %
EOSINOPHIL # BLD AUTO: 0.2 X10E3/UL (ref 0–0.4)
EOSINOPHIL NFR BLD AUTO: 4 %
ERYTHROCYTE [DISTWIDTH] IN BLOOD BY AUTOMATED COUNT: 12.8 % (ref 11.6–15.4)
HCT VFR BLD AUTO: 41.8 % (ref 37.5–51)
HGB BLD-MCNC: 14.2 G/DL (ref 13–17.7)
IMM GRANULOCYTES # BLD: 0 X10E3/UL (ref 0–0.1)
IMM GRANULOCYTES NFR BLD: 0 %
LYMPHOCYTES # BLD AUTO: 1.7 X10E3/UL (ref 0.7–3.1)
LYMPHOCYTES NFR BLD AUTO: 25 %
MCH RBC QN AUTO: 29.6 PG (ref 26.6–33)
MCHC RBC AUTO-ENTMCNC: 34 G/DL (ref 31.5–35.7)
MCV RBC AUTO: 87 FL (ref 79–97)
MONOCYTES # BLD AUTO: 0.7 X10E3/UL (ref 0.1–0.9)
MONOCYTES NFR BLD AUTO: 10 %
NEUTROPHILS # BLD AUTO: 4.1 X10E3/UL (ref 1.4–7)
NEUTROPHILS NFR BLD AUTO: 60 %
PLATELET # BLD AUTO: 311 X10E3/UL (ref 150–450)
RBC # BLD AUTO: 4.8 X10E6/UL (ref 4.14–5.8)
WBC # BLD AUTO: 6.8 X10E3/UL (ref 3.4–10.8)

## 2024-03-06 ENCOUNTER — TELEPHONE (OUTPATIENT)
Dept: PSYCHIATRY | Facility: CLINIC | Age: 29
End: 2024-03-06

## 2024-03-06 DIAGNOSIS — F51.8 DISRUPTED SLEEP-WAKE CYCLE: ICD-10-CM

## 2024-03-06 DIAGNOSIS — G47.20 DISRUPTED SLEEP-WAKE CYCLE: ICD-10-CM

## 2024-03-06 RX ORDER — UREA 10 %
1 LOTION (ML) TOPICAL
Qty: 30 TABLET | Refills: 2 | Status: SHIPPED | OUTPATIENT
Start: 2024-03-06

## 2024-03-06 NOTE — TELEPHONE ENCOUNTER
Message received from Pamela Torres, Pharmacist at [facility], reporting that patient was asking to go back to 1mg melatonin. Can script be sent?

## 2024-03-12 NOTE — PSYCH
MEDICATION MANAGEMENT NOTE      [facility]: [facility]   Mental Health Outpatient Services   [address] [place] [id number]   [phone]    Name and Date of Birth:  Tex Hill 28 y.o. 1995 MRN: [id number]    Date of Visit: March 13, 2024    Reason for Visit: Follow-up visit regarding medication management     _____________________________    Assessment/Plan   Tex Hill is a 28 y.o. male, Single, domiciled with mother, on permanent disability, with past medical history of Vitamin D deficiency, prior psychiatric diagnoses of schizoaffective disorder; multiple hospitalizations and two suicide attempts (most recently 2021).  Tex was personally seen and evaluated today at the [facility] outpatient clinic for follow-up regarding medication management.        On assessment, Tex Hill reports things have been going well overall. He states he has only been taking 100mg of the clozapine (in addition to his risperdal 2mg) but he has stopped talking to himself and stopped hearing the voice of his neighbor. He reports he is doing well and his mood is stable. He does not feel depressed or hopeless and denies thoughts to hurt himself. Denies current symptoms of psychosis. He continues to remain rigid regarding continuing on two antipsychotic agents but we will continue to discuss the possibility of monotherapy. Provided psycho education about the importance of addressing metabolic concerns with clozapine, such as weight management with dietary and exercise changes. Tex would not like to speak with a nutrition specialist at this time. He denies feeling overly anxious or restless. He denies side effects from his medications currently, daily bowel movements, denies chest pain, shortness of breath, or flu-like symptoms. He is planning to walk more since the weather is nice. We discussed his traits consistent with autism (he believes he has  Mirella which is no longer in DSM-5), and recommending neuropsychological or psychological testing now that his psychosis symptoms are under control and managed appropriately. We will plan for 4 week follow up. Tex is also open to the idea of a peer specialist, so we will make this referral. Patient is in agreement with the treatment plan as detailed below, and agrees to call the office with any concerns or side effects between appointments.     DSM-5 Diagnoses/Visit Diagnoses:     1. Schizophrenia, unspecified type (HCC)    2. Suspected autism disorder      Treatment Recommendations/Precautions:  Continue psychopharmacological management as follows:  Continue risperdal 2mg HS for psychosis  Decrease Clozapine 100mg HS (pt taking in AM), for psychosis  Continue weekly CBC with differential per [facility] program (transition to every other week around April 25th)   Most recent ANC = 5.2 on 3/11/24  Clozapine level = 70 (low) on 12/11 on 100mg dosage  Continue vitamin D supplementation for deficiency  Continue daily sennakot for clozapine induced constipation  Melatonin 1mg HS PRN for sleep-wake cycle regulation  Labs most recently obtained March 2024, reviewed.   Follow up in 4 weeks for medication management  On waiting list for individual psychotherapy  Refer for peer specialist   Refer for psychological/neuropsychological testing for autism evaluation  Follow up with PCP for medical issues and ongoing care  Aware of 24 hour and weekend coverage for urgent situations accessed by calling [facility] main practice number    Individual psychotherapy provided: No     Treatment Plan:     Completed and signed during the session: Not applicable - Treatment Plan not due at this session    Medications Risks/Benefits:      Risks, Benefits And Possible Side Effects Of Medications:    Risks, benefits, and possible side effects of medications explained to Tex and he verbalizes  "understanding and agreement for treatment.     Controlled Medication Discussion:     Not applicable    Medical Decision Making / Counseling / Coordination of Care:  The following interventions are recommended: return in 1 month for follow up.  Although patient's acute lethality risk is LOW, long-term/chronic lethality risk is mildly elevated given the risk factors listed above. However, at the current moment, Tex is future-oriented, forward-thinking, and demonstrates ability to act in a self-preserving manner as evidenced by volitionally seeking psychiatric evaluation and treatment today. To mitigate future risk, patient should adhere to treatment recommendations, avoid alcohol/illicit substance use, utilize community-based resources and familiar support, and prioritize mental health treatment. The diagnosis and treatment plan were reviewed with the patient. Risks, benefits, and alternatives to treatment were discussed. The importance of medication and treatment compliance was reviewed with the patient.     _____________________________________________    History of Present Illness     Chief Complaint: \"I'm good\"    SUBJECTIVE:    Tex Hill is a 28 y.o. male, possessing a past psychiatric history significant for schizophrenia, who was personally seen and evaluated at the [facility] outpatient clinic for follow-up regarding medication management.  At their last visit, the plan was to increase clozapine.    Tex states that since their previous psychiatric appointment with this writer, his mood has been flat/stable. He has been taking only 100mg of the clozapine, sleeping well, feeling tired when he walks. He has not been talking to himself or hearing his neighbor's voice recently, feels his symptoms are adequately managed. He has not been walking more, he is hoping to walk more now that the weather is improving. Having bowel movements every day, denies side effects with his medications " currently. HE feels things have been stable     Presently, patient denies suicidal/homicidal ideation in addition to thoughts of self-injury.  At conclusion of evaluation, patient is amenable to the recommendations of this writer including: continue psychotropic medications as prescribed.  Also, patient is amenable to calling/contacting the outpatient office including this writer if any acute adverse effects of their medication regimen arise in addition to any comments or concerns pertaining to their psychiatric management.  Patient is amenable to calling/contacting crisis and/or attending to the nearest emergency department if their clinical condition deteriorates to assure their safety and stability, stating that they are able to appropriately confide in their mother regarding their psychiatric state.    Current Rating Scores:     None completed today.    Psychiatric Review Of Systems:  Unchanged information from this writer's previous assessment is copied and italicized; information that has changed is bolded.    Appetite: increased  Adverse eating: no  Weight changes: leveled out     Insomnia/sleeplessness: better  Fatigue/anergy: denies  Anhedonia/lack of interest: stable  Attention/concentration: improving  Psychomotor agitation/retardation: no  Somatic symptoms: no  Anxiety/panic attack: denies  Mylene/hypomania: no  Hopelessness/helplessness/worthlessness: no  Self-injurious behavior/high-risk behavior: no  Suicidal ideation: no  Homicidal ideation: no  Auditory hallucinations: denies  Visual hallucinations: no  Other perceptual disturbances: no  Delusional thinking: no  Obsessive/compulsive symptoms: no    Review Of Systems:      Constitutional negative   ENT negative   Cardiovascular negative   Respiratory negative   Gastrointestinal negative   Genitourinary negative   Musculoskeletal negative   Integumentary negative   Neurological negative   Endocrine negative   Other Symptoms none, all other systems are  "negative     Objective    OBJECTIVE:     Visit Vitals  Smoking Status Never      Wt Readings from Last 6 Encounters:   No data found for Wt        History reviewed. No pertinent past medical history.   History reviewed. No pertinent surgical history.    Meds/Allergies    Allergies   Allergen Reactions    Gantrisin [Sulfisoxazole] Hallucinations     Took as a child and had hallucinations per mother     Current Outpatient Medications   Medication Instructions    b complex vitamins capsule 1 capsule, Oral, Daily    cloZAPine (CLOZARIL) 100 mg tablet Take 1 tablet (100 mg) by mouth at bedtime    melatonin 1 mg, Oral, Daily at bedtime PRN    risperiDONE (RISPERDAL) 2 mg, Oral, Daily at bedtime    senna (SENOKOT) 8.6 mg, Oral, Daily at bedtime    Vitamin D (Cholecalciferol) 400 Units, Oral, Daily           Mental Status Exam:    Appearance age appropriate, casually dressed, poor eye contact, seated comfortably   Behavior cooperative, calm   Speech normal rate, normal volume, normal pitch   Mood \"Good\"   Affect blunted   Thought Processes organized, goal directed   Associations intact associations   Thought Content no overt delusions   Perceptual Disturbances: Denies auditory or visual hallucinations and Does not appear to be responding to internal stimuli   Abnormal Thoughts  Risk Potential Denies suicidal or homicidal ideation, plan, or intent   Orientation oriented to person, place, time/date, and situation   Memory recent and remote memory grossly intact   Consciousness alert and awake   Attention Span Concentration Span attention span and concentration are age appropriate   Intellect appears to be of average intelligence   Insight fair   Judgement fair   Muscle Strength and  Gait normal muscle strength and normal muscle tone, normal gait and normal balance   Motor Activity no abnormal movements   Language no difficulty naming common objects, no difficulty repeating a phrase, no difficulty writing a sentence   Fund of " Knowledge adequate knowledge of current events  adequate fund of knowledge regarding past history  adequate fund of knowledge regarding vocabulary      Laboratory Results: I have personally reviewed all pertinent laboratory/tests results    Ancillary Orders on 03/08/2024   Component Date Value Ref Range Status    White Blood Cell Count 03/11/2024 8.1  3.4 - 10.8 x10E3/uL Final    Red Blood Cell Count 03/11/2024 4.95  4.14 - 5.80 x10E6/uL Final    Hemoglobin 03/11/2024 14.4  13.0 - 17.7 g/dL Final    HCT 03/11/2024 42.4  37.5 - 51.0 % Final    MCV 03/11/2024 86  79 - 97 fL Final    MCH 03/11/2024 29.1  26.6 - 33.0 pg Final    MCHC 03/11/2024 34.0  31.5 - 35.7 g/dL Final    RDW 03/11/2024 12.9  11.6 - 15.4 % Final    Platelet Count 03/11/2024 287  150 - 450 x10E3/uL Final    Neutrophils 03/11/2024 64  Not Estab. % Final    Lymphocytes 03/11/2024 23  Not Estab. % Final    Monocytes 03/11/2024 9  Not Estab. % Final    Eosinophils 03/11/2024 3  Not Estab. % Final    Basophils PCT 03/11/2024 1  Not Estab. % Final    Neutrophils (Absolute) 03/11/2024 5.2  1.4 - 7.0 x10E3/uL Final    Lymphocytes (Absolute) 03/11/2024 1.9  0.7 - 3.1 x10E3/uL Final    Monocytes (Absolute) 03/11/2024 0.7  0.1 - 0.9 x10E3/uL Final    Eosinophils (Absolute) 03/11/2024 0.2  0.0 - 0.4 x10E3/uL Final    Basophils ABS 03/11/2024 0.1  0.0 - 0.2 x10E3/uL Final    Immature Granulocytes 03/11/2024 0  Not Estab. % Final    Immature Granulocytes (Absolute) 03/11/2024 0.0  0.0 - 0.1 x10E3/uL Final   Orders Only on 03/04/2024   Component Date Value Ref Range Status    White Blood Cell Count 03/04/2024 6.8  3.4 - 10.8 x10E3/uL Final    Red Blood Cell Count 03/04/2024 4.80  4.14 - 5.80 x10E6/uL Final    Hemoglobin 03/04/2024 14.2  13.0 - 17.7 g/dL Final    HCT 03/04/2024 41.8  37.5 - 51.0 % Final    MCV 03/04/2024 87  79 - 97 fL Final    MCH 03/04/2024 29.6  26.6 - 33.0 pg Final    MCHC 03/04/2024 34.0  31.5 - 35.7 g/dL Final    RDW 03/04/2024 12.8  11.6 -  15.4 % Final    Platelet Count 03/04/2024 311  150 - 450 x10E3/uL Final    Neutrophils 03/04/2024 60  Not Estab. % Final    Lymphocytes 03/04/2024 25  Not Estab. % Final    Monocytes 03/04/2024 10  Not Estab. % Final    Eosinophils 03/04/2024 4  Not Estab. % Final    Basophils PCT 03/04/2024 1  Not Estab. % Final    Neutrophils (Absolute) 03/04/2024 4.1  1.4 - 7.0 x10E3/uL Final    Lymphocytes (Absolute) 03/04/2024 1.7  0.7 - 3.1 x10E3/uL Final    Monocytes (Absolute) 03/04/2024 0.7  0.1 - 0.9 x10E3/uL Final    Eosinophils (Absolute) 03/04/2024 0.2  0.0 - 0.4 x10E3/uL Final    Basophils ABS 03/04/2024 0.1  0.0 - 0.2 x10E3/uL Final    Immature Granulocytes 03/04/2024 0  Not Estab. % Final    Immature Granulocytes (Absolute) 03/04/2024 0.0  0.0 - 0.1 x10E3/uL Final   Orders Only on 02/27/2024   Component Date Value Ref Range Status    White Blood Cell Count 02/27/2024 8.9  3.4 - 10.8 x10E3/uL Final    Red Blood Cell Count 02/27/2024 4.61  4.14 - 5.80 x10E6/uL Final    Hemoglobin 02/27/2024 13.6  13.0 - 17.7 g/dL Final    HCT 02/27/2024 39.0  37.5 - 51.0 % Final    MCV 02/27/2024 85  79 - 97 fL Final    MCH 02/27/2024 29.5  26.6 - 33.0 pg Final    MCHC 02/27/2024 34.9  31.5 - 35.7 g/dL Final    RDW 02/27/2024 12.8  11.6 - 15.4 % Final    Platelet Count 02/27/2024 304  150 - 450 x10E3/uL Final    Neutrophils 02/27/2024 67  Not Estab. % Final    Lymphocytes 02/27/2024 19  Not Estab. % Final    Monocytes 02/27/2024 10  Not Estab. % Final    Eosinophils 02/27/2024 3  Not Estab. % Final    Basophils PCT 02/27/2024 1  Not Estab. % Final    Neutrophils (Absolute) 02/27/2024 6.0  1.4 - 7.0 x10E3/uL Final    Lymphocytes (Absolute) 02/27/2024 1.7  0.7 - 3.1 x10E3/uL Final    Monocytes (Absolute) 02/27/2024 0.9  0.1 - 0.9 x10E3/uL Final    Eosinophils (Absolute) 02/27/2024 0.3  0.0 - 0.4 x10E3/uL Final    Basophils ABS 02/27/2024 0.1  0.0 - 0.2 x10E3/uL Final    Immature Granulocytes 02/27/2024 0  Not Estab. % Final    Immature  Granulocytes (Absolute) 02/27/2024 0.0  0.0 - 0.1 x10E3/uL Final   Orders Only on 02/19/2024   Component Date Value Ref Range Status    White Blood Cell Count 02/19/2024 7.1  3.4 - 10.8 x10E3/uL Final    Red Blood Cell Count 02/19/2024 4.87  4.14 - 5.80 x10E6/uL Final    Hemoglobin 02/19/2024 14.3  13.0 - 17.7 g/dL Final    HCT 02/19/2024 42.2  37.5 - 51.0 % Final    MCV 02/19/2024 87  79 - 97 fL Final    MCH 02/19/2024 29.4  26.6 - 33.0 pg Final    MCHC 02/19/2024 33.9  31.5 - 35.7 g/dL Final    RDW 02/19/2024 12.9  11.6 - 15.4 % Final    Platelet Count 02/19/2024 302  150 - 450 x10E3/uL Final    Neutrophils 02/19/2024 60  Not Estab. % Final    Lymphocytes 02/19/2024 26  Not Estab. % Final    Monocytes 02/19/2024 10  Not Estab. % Final    Eosinophils 02/19/2024 3  Not Estab. % Final    Basophils PCT 02/19/2024 1  Not Estab. % Final    Neutrophils (Absolute) 02/19/2024 4.3  1.4 - 7.0 x10E3/uL Final    Lymphocytes (Absolute) 02/19/2024 1.8  0.7 - 3.1 x10E3/uL Final    Monocytes (Absolute) 02/19/2024 0.7  0.1 - 0.9 x10E3/uL Final    Eosinophils (Absolute) 02/19/2024 0.2  0.0 - 0.4 x10E3/uL Final    Basophils ABS 02/19/2024 0.1  0.0 - 0.2 x10E3/uL Final    Immature Granulocytes 02/19/2024 0  Not Estab. % Final    Immature Granulocytes (Absolute) 02/19/2024 0.0  0.0 - 0.1 x10E3/uL Final   Orders Only on 02/12/2024   Component Date Value Ref Range Status    White Blood Cell Count 02/12/2024 7.6  3.4 - 10.8 x10E3/uL Final    Red Blood Cell Count 02/12/2024 4.85  4.14 - 5.80 x10E6/uL Final    Hemoglobin 02/12/2024 14.0  13.0 - 17.7 g/dL Final    HCT 02/12/2024 42.4  37.5 - 51.0 % Final    MCV 02/12/2024 87  79 - 97 fL Final    MCH 02/12/2024 28.9  26.6 - 33.0 pg Final    MCHC 02/12/2024 33.0  31.5 - 35.7 g/dL Final    RDW 02/12/2024 13.0  11.6 - 15.4 % Final    Platelet Count 02/12/2024 329  150 - 450 x10E3/uL Final    Neutrophils 02/12/2024 63  Not Estab. % Final    Lymphocytes 02/12/2024 24  Not Estab. % Final     Monocytes 02/12/2024 9  Not Estab. % Final    Eosinophils 02/12/2024 3  Not Estab. % Final    Basophils PCT 02/12/2024 1  Not Estab. % Final    Neutrophils (Absolute) 02/12/2024 4.8  1.4 - 7.0 x10E3/uL Final    Lymphocytes (Absolute) 02/12/2024 1.8  0.7 - 3.1 x10E3/uL Final    Monocytes (Absolute) 02/12/2024 0.7  0.1 - 0.9 x10E3/uL Final    Eosinophils (Absolute) 02/12/2024 0.2  0.0 - 0.4 x10E3/uL Final    Basophils ABS 02/12/2024 0.1  0.0 - 0.2 x10E3/uL Final    Immature Granulocytes 02/12/2024 0  Not Estab. % Final    Immature Granulocytes (Absolute) 02/12/2024 0.0  0.0 - 0.1 x10E3/uL Final   Orders Only on 02/05/2024   Component Date Value Ref Range Status    White Blood Cell Count 02/05/2024 6.9  3.4 - 10.8 x10E3/uL Final    Red Blood Cell Count 02/05/2024 4.72  4.14 - 5.80 x10E6/uL Final    Hemoglobin 02/05/2024 13.8  13.0 - 17.7 g/dL Final    HCT 02/05/2024 40.3  37.5 - 51.0 % Final    MCV 02/05/2024 85  79 - 97 fL Final    MCH 02/05/2024 29.2  26.6 - 33.0 pg Final    MCHC 02/05/2024 34.2  31.5 - 35.7 g/dL Final    RDW 02/05/2024 13.1  11.6 - 15.4 % Final    Platelet Count 02/05/2024 303  150 - 450 x10E3/uL Final    Neutrophils 02/05/2024 60  Not Estab. % Final    Lymphocytes 02/05/2024 25  Not Estab. % Final    Monocytes 02/05/2024 10  Not Estab. % Final    Eosinophils 02/05/2024 4  Not Estab. % Final    Basophils PCT 02/05/2024 1  Not Estab. % Final    Neutrophils (Absolute) 02/05/2024 4.1  1.4 - 7.0 x10E3/uL Final    Lymphocytes (Absolute) 02/05/2024 1.7  0.7 - 3.1 x10E3/uL Final    Monocytes (Absolute) 02/05/2024 0.7  0.1 - 0.9 x10E3/uL Final    Eosinophils (Absolute) 02/05/2024 0.2  0.0 - 0.4 x10E3/uL Final    Basophils ABS 02/05/2024 0.1  0.0 - 0.2 x10E3/uL Final    Immature Granulocytes 02/05/2024 0  Not Estab. % Final    Immature Granulocytes (Absolute) 02/05/2024 0.0  0.0 - 0.1 x10E3/uL Final   Orders Only on 01/29/2024   Component Date Value Ref Range Status    White Blood Cell Count 01/29/2024 8.2   3.4 - 10.8 x10E3/uL Final    Red Blood Cell Count 01/29/2024 4.70  4.14 - 5.80 x10E6/uL Final    Hemoglobin 01/29/2024 13.6  13.0 - 17.7 g/dL Final    HCT 01/29/2024 40.7  37.5 - 51.0 % Final    MCV 01/29/2024 87  79 - 97 fL Final    MCH 01/29/2024 28.9  26.6 - 33.0 pg Final    MCHC 01/29/2024 33.4  31.5 - 35.7 g/dL Final    RDW 01/29/2024 13.1  11.6 - 15.4 % Final    Platelet Count 01/29/2024 290  150 - 450 x10E3/uL Final    Neutrophils 01/29/2024 67  Not Estab. % Final    Lymphocytes 01/29/2024 20  Not Estab. % Final    Monocytes 01/29/2024 8  Not Estab. % Final    Eosinophils 01/29/2024 3  Not Estab. % Final    Basophils PCT 01/29/2024 1  Not Estab. % Final    Neutrophils (Absolute) 01/29/2024 5.6  1.4 - 7.0 x10E3/uL Final    Lymphocytes (Absolute) 01/29/2024 1.6  0.7 - 3.1 x10E3/uL Final    Monocytes (Absolute) 01/29/2024 0.7  0.1 - 0.9 x10E3/uL Final    Eosinophils (Absolute) 01/29/2024 0.2  0.0 - 0.4 x10E3/uL Final    Basophils ABS 01/29/2024 0.1  0.0 - 0.2 x10E3/uL Final    Immature Granulocytes 01/29/2024 1  Not Estab. % Final    Immature Granulocytes (Absolute) 01/29/2024 0.0  0.0 - 0.1 x10E3/uL Final   Orders Only on 01/24/2024   Component Date Value Ref Range Status    Hemoglobin A1C 01/29/2024 5.3  4.8 - 5.6 % Final    Comment:          Prediabetes: 5.7 - 6.4           Diabetes: >6.4           Glycemic control for adults with diabetes: <7.0      Estimated Average Glucose 01/29/2024 105  mg/dL Final   Ancillary Orders on 01/19/2024   Component Date Value Ref Range Status    White Blood Cell Count 01/22/2024 7.1  3.4 - 10.8 x10E3/uL Final    Red Blood Cell Count 01/22/2024 4.59  4.14 - 5.80 x10E6/uL Final    Hemoglobin 01/22/2024 13.3  13.0 - 17.7 g/dL Final    HCT 01/22/2024 40.0  37.5 - 51.0 % Final    MCV 01/22/2024 87  79 - 97 fL Final    MCH 01/22/2024 29.0  26.6 - 33.0 pg Final    MCHC 01/22/2024 33.3  31.5 - 35.7 g/dL Final    RDW 01/22/2024 13.3  11.6 - 15.4 % Final    Platelet Count 01/22/2024 282   150 - 450 x10E3/uL Final    Neutrophils 01/22/2024 63  Not Estab. % Final    Lymphocytes 01/22/2024 23  Not Estab. % Final    Monocytes 01/22/2024 9  Not Estab. % Final    Eosinophils 01/22/2024 4  Not Estab. % Final    Basophils PCT 01/22/2024 1  Not Estab. % Final    Neutrophils (Absolute) 01/22/2024 4.5  1.4 - 7.0 x10E3/uL Final    Lymphocytes (Absolute) 01/22/2024 1.6  0.7 - 3.1 x10E3/uL Final    Monocytes (Absolute) 01/22/2024 0.6  0.1 - 0.9 x10E3/uL Final    Eosinophils (Absolute) 01/22/2024 0.3  0.0 - 0.4 x10E3/uL Final    Basophils ABS 01/22/2024 0.1  0.0 - 0.2 x10E3/uL Final    Immature Granulocytes 01/22/2024 0  Not Estab. % Final    Immature Granulocytes (Absolute) 01/22/2024 0.0  0.0 - 0.1 x10E3/uL Final   Orders Only on 01/15/2024   Component Date Value Ref Range Status    White Blood Cell Count 01/15/2024 8.0  3.4 - 10.8 x10E3/uL Final    Red Blood Cell Count 01/15/2024 4.68  4.14 - 5.80 x10E6/uL Final    Hemoglobin 01/15/2024 13.9  13.0 - 17.7 g/dL Final    HCT 01/15/2024 39.9  37.5 - 51.0 % Final    MCV 01/15/2024 85  79 - 97 fL Final    MCH 01/15/2024 29.7  26.6 - 33.0 pg Final    MCHC 01/15/2024 34.8  31.5 - 35.7 g/dL Final    RDW 01/15/2024 13.2  11.6 - 15.4 % Final    Platelet Count 01/15/2024 307  150 - 450 x10E3/uL Final    Neutrophils 01/15/2024 62  Not Estab. % Final    Lymphocytes 01/15/2024 24  Not Estab. % Final    Monocytes 01/15/2024 9  Not Estab. % Final    Eosinophils 01/15/2024 4  Not Estab. % Final    Basophils PCT 01/15/2024 1  Not Estab. % Final    Neutrophils (Absolute) 01/15/2024 4.9  1.4 - 7.0 x10E3/uL Final    Lymphocytes (Absolute) 01/15/2024 1.9  0.7 - 3.1 x10E3/uL Final    Monocytes (Absolute) 01/15/2024 0.8  0.1 - 0.9 x10E3/uL Final    Eosinophils (Absolute) 01/15/2024 0.3  0.0 - 0.4 x10E3/uL Final    Basophils ABS 01/15/2024 0.1  0.0 - 0.2 x10E3/uL Final    Immature Granulocytes 01/15/2024 0  Not Estab. % Final    Immature Granulocytes (Absolute) 01/15/2024 0.0  0.0 - 0.1  "x10E3/uL Final   There may be more visits with results that are not included.             ___________________________________    History Review: The following portions of the patient's history were reviewed and updated as appropriate: allergies, current medications, past family history, past medical history, past social history, past surgical history, and problem list.    Unchanged information from this writer's previous assessment is copied and italicized; information that has changed is bolded.    Past Psychiatric History:      Past psychiatric diagnoses:   Schizophrenia, schizoaffective d/o, anxiety, depression   Inpatient psychiatric admissions:   10 times total (4 were involuntary)  First - age 22 after stealing sisters klonopin  Most common reason: first few were \"no reason\"  2017 - got invega shot was a very influential moment  2 suicide attempts/SI: SI and wanting to jump off a roof (2021) a few months later after sleeping pills, tried to hang himself 2 years ago  Psychosis 3 times (most recent for that reason was 3 years)  Most recent was 2 years ago  Has stayed out of the hospital due to \"taking his medications, not having suicidal ideation\"  Denies history of ECT, denies having had ICM in past.   Prior outpatient psychiatric treatment:   Saw Chilo Ramsey for 3 years  Saw psychiatrist at [facility] for a year before that  Past/current psychotherapy:   Worked with a therapist at [facility] Masood, stopped seeing 1-2 years ago because he left the practice  History of suicidal attempts/gestures:   2: took sleeping pills (August 2021), tried to hang self two years  History of non-suicidal self-injurious behavior:   None  History of violence/aggressive behaviors:   None  Psychotropic medication trials:   Invega, (discomfort) risperdal, zyprexa, thorazine (all unknown reasons for discontinuation)  Abilify (bad reaction)  Invega SCHAEFER x1 in 2019 (\"hurt his head\")  Ativan ('caused psychosis')  Loxapine - feeling too tired on " "dosages above 20mg  Risperdal - above 2mg causes nausea and \"hurting\" in his head  Clozapine - current medication  Melatonin - current medication  Substance abuse inpatient/outpatient rehabilitation:   Rehab in 2017 - marijuana use  Eating disorder history:   No  Other services: used to have a  but reports he does not anymore because they ran out of things to do together      Substance Abuse History:     States he was smoking a lot of marijuana for 5 years, nearly every day, stopped smoking 2019 July after he \"freaked out\" and thought \"there were people in his basement and living in his house and torturing him. \"     Denies any other recreational drug use and otherwise denies history of alcohol, illict substance, or tobacco abuse., Patient denies previous legal actions or arrests related to substance intoxication including prior DWIs/DUIs., Patient does not exhibit objective evidence of substance withdrawal during today's examination nor do they appear under the influence of any psychoactive substance.       Family Psychiatric History:      Family History   History reviewed. No pertinent family history.         Psychiatric Family History: Per patient he is adopted,  Per mother is not adopted. Maternal grandfather - schizophrenia  Tex's half sister has bipolar disorder     Social History:     Developmental: Was in special education, denies IEP  Per care everywhere, mild developmental delay, early intervention at age 2.5. Concern for autism, diagnosed with ADHD.  Has 2 sister (50 and 34 years old), states he was adopted but his mother reports he is not adopted  Education: high school diploma/GED  Marital history: single  Children: none  Living arrangement, social support: Family is supportive, mom is supportive. Lives with mother, on a waiting list for housing, on disability for schizoaffective disorder.   Dad lives in [place]   Occupational History: Permanent disability. 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 , Patient denies history of arrests or violence with a deadly weapon.    history: None     Traumatic History:      Abuse: Denies  Other Traumatic Events: Bullying, and would not disclose further.  ___________________________________      Visit Time    Visit Start Time: 3:00  Visit Stop Time: 3:40  Total Visit Duration:  40 minutes    Lauren Stockton MD   03/13/24    "

## 2024-03-13 ENCOUNTER — OFFICE VISIT (OUTPATIENT)
Dept: PSYCHIATRY | Facility: CLINIC | Age: 29
End: 2024-03-13
Payer: COMMERCIAL

## 2024-03-13 DIAGNOSIS — Z79.899 LONG TERM CURRENT USE OF CLOZAPINE: ICD-10-CM

## 2024-03-13 DIAGNOSIS — F20.9 SCHIZOPHRENIA, UNSPECIFIED TYPE (HCC): Primary | ICD-10-CM

## 2024-03-13 DIAGNOSIS — R68.89 SUSPECTED AUTISM DISORDER: ICD-10-CM

## 2024-03-13 DIAGNOSIS — Z79.899 LONG TERM CURRENT USE OF ANTIPSYCHOTIC MEDICATION: ICD-10-CM

## 2024-03-13 PROCEDURE — 99214 OFFICE O/P EST MOD 30 MIN: CPT | Performed by: STUDENT IN AN ORGANIZED HEALTH CARE EDUCATION/TRAINING PROGRAM

## 2024-03-13 RX ORDER — CLOZAPINE 100 MG/1
TABLET ORAL
Qty: 30 TABLET | Refills: 1 | Status: SHIPPED | OUTPATIENT
Start: 2024-03-13

## 2024-03-13 RX ORDER — RISPERIDONE 2 MG/1
2 TABLET ORAL
Qty: 30 TABLET | Refills: 1 | Status: SHIPPED | OUTPATIENT
Start: 2024-03-13

## 2024-03-13 NOTE — PATIENT INSTRUCTIONS
Tex reports he has been taking 100mg of the clozapine and has had benefits, so we will go back down to that dosage  We will continue other medications for now.    Continue to consider nutritionist support, let me know if you are interested    Will be reaching out to our behavioral health navigator about peer specialist support    We discussed psychological testing / neuropsychological testing for autism/aspergers evaluation. I have provided a referral slip.  In [facility] there is an Office for Intellectual Disabilities and Autism Services. The 's number is [phone], you can try calling them to see if they have recommendations for locations to get this testing completed.

## 2024-03-15 ENCOUNTER — TELEPHONE (OUTPATIENT)
Dept: PSYCHIATRY | Facility: CLINIC | Age: 29
End: 2024-03-15

## 2024-03-15 NOTE — TELEPHONE ENCOUNTER
Called patient in regards to place on the wait list. Krystalr wanted to offer an appt with Horace. Writer RUST to call intake dept

## 2024-03-18 LAB
BASOPHILS # BLD AUTO: 0.1 X10E3/UL (ref 0–0.2)
BASOPHILS NFR BLD AUTO: 1 %
EOSINOPHIL # BLD AUTO: 0.1 X10E3/UL (ref 0–0.4)
EOSINOPHIL NFR BLD AUTO: 2 %
ERYTHROCYTE [DISTWIDTH] IN BLOOD BY AUTOMATED COUNT: 12.4 % (ref 11.6–15.4)
HCT VFR BLD AUTO: 41.3 % (ref 37.5–51)
HGB BLD-MCNC: 13.9 G/DL (ref 13–17.7)
IMM GRANULOCYTES # BLD: 0 X10E3/UL (ref 0–0.1)
IMM GRANULOCYTES NFR BLD: 0 %
LYMPHOCYTES # BLD AUTO: 1.8 X10E3/UL (ref 0.7–3.1)
LYMPHOCYTES NFR BLD AUTO: 25 %
MCH RBC QN AUTO: 28.7 PG (ref 26.6–33)
MCHC RBC AUTO-ENTMCNC: 33.7 G/DL (ref 31.5–35.7)
MCV RBC AUTO: 85 FL (ref 79–97)
MONOCYTES # BLD AUTO: 0.7 X10E3/UL (ref 0.1–0.9)
MONOCYTES NFR BLD AUTO: 9 %
NEUTROPHILS # BLD AUTO: 4.4 X10E3/UL (ref 1.4–7)
NEUTROPHILS NFR BLD AUTO: 63 %
PLATELET # BLD AUTO: 308 X10E3/UL (ref 150–450)
RBC # BLD AUTO: 4.85 X10E6/UL (ref 4.14–5.8)
WBC # BLD AUTO: 7.1 X10E3/UL (ref 3.4–10.8)

## 2024-03-25 LAB
BASOPHILS # BLD AUTO: 0.1 X10E3/UL (ref 0–0.2)
BASOPHILS NFR BLD AUTO: 1 %
EOSINOPHIL # BLD AUTO: 0.1 X10E3/UL (ref 0–0.4)
EOSINOPHIL NFR BLD AUTO: 1 %
ERYTHROCYTE [DISTWIDTH] IN BLOOD BY AUTOMATED COUNT: 12.3 % (ref 11.6–15.4)
HCT VFR BLD AUTO: 43.7 % (ref 37.5–51)
HGB BLD-MCNC: 14.4 G/DL (ref 13–17.7)
IMM GRANULOCYTES # BLD: 0 X10E3/UL (ref 0–0.1)
IMM GRANULOCYTES NFR BLD: 0 %
LYMPHOCYTES # BLD AUTO: 2 X10E3/UL (ref 0.7–3.1)
LYMPHOCYTES NFR BLD AUTO: 28 %
MCH RBC QN AUTO: 28.5 PG (ref 26.6–33)
MCHC RBC AUTO-ENTMCNC: 33 G/DL (ref 31.5–35.7)
MCV RBC AUTO: 86 FL (ref 79–97)
MONOCYTES # BLD AUTO: 0.7 X10E3/UL (ref 0.1–0.9)
MONOCYTES NFR BLD AUTO: 10 %
NEUTROPHILS # BLD AUTO: 4.2 X10E3/UL (ref 1.4–7)
NEUTROPHILS NFR BLD AUTO: 60 %
PLATELET # BLD AUTO: 284 X10E3/UL (ref 150–450)
RBC # BLD AUTO: 5.06 X10E6/UL (ref 4.14–5.8)
WBC # BLD AUTO: 6.9 X10E3/UL (ref 3.4–10.8)

## 2024-03-29 DIAGNOSIS — K59.03 DRUG-INDUCED CONSTIPATION: ICD-10-CM

## 2024-03-29 RX ORDER — SENNOSIDES 8.6 MG
8.6 TABLET ORAL
Qty: 30 TABLET | Refills: 1 | Status: SHIPPED | OUTPATIENT
Start: 2024-03-29

## 2024-04-08 LAB
BASOPHILS # BLD AUTO: 0.1 X10E3/UL (ref 0–0.2)
BASOPHILS NFR BLD AUTO: 1 %
EOSINOPHIL # BLD AUTO: 0 X10E3/UL (ref 0–0.4)
EOSINOPHIL NFR BLD AUTO: 1 %
ERYTHROCYTE [DISTWIDTH] IN BLOOD BY AUTOMATED COUNT: 12.6 % (ref 11.6–15.4)
HCT VFR BLD AUTO: 42.4 % (ref 37.5–51)
HGB BLD-MCNC: 14.8 G/DL (ref 13–17.7)
IMM GRANULOCYTES # BLD: 0 X10E3/UL (ref 0–0.1)
IMM GRANULOCYTES NFR BLD: 0 %
LYMPHOCYTES # BLD AUTO: 1.8 X10E3/UL (ref 0.7–3.1)
LYMPHOCYTES NFR BLD AUTO: 26 %
MCH RBC QN AUTO: 29.2 PG (ref 26.6–33)
MCHC RBC AUTO-ENTMCNC: 34.9 G/DL (ref 31.5–35.7)
MCV RBC AUTO: 84 FL (ref 79–97)
MONOCYTES # BLD AUTO: 0.7 X10E3/UL (ref 0.1–0.9)
MONOCYTES NFR BLD AUTO: 10 %
NEUTROPHILS # BLD AUTO: 4.3 X10E3/UL (ref 1.4–7)
NEUTROPHILS NFR BLD AUTO: 62 %
PLATELET # BLD AUTO: 283 X10E3/UL (ref 150–450)
RBC # BLD AUTO: 5.06 X10E6/UL (ref 4.14–5.8)
WBC # BLD AUTO: 6.9 X10E3/UL (ref 3.4–10.8)

## 2024-04-15 LAB
BASOPHILS # BLD AUTO: 0.1 X10E3/UL (ref 0–0.2)
BASOPHILS NFR BLD AUTO: 1 %
EOSINOPHIL # BLD AUTO: 0.1 X10E3/UL (ref 0–0.4)
EOSINOPHIL NFR BLD AUTO: 1 %
ERYTHROCYTE [DISTWIDTH] IN BLOOD BY AUTOMATED COUNT: 12.6 % (ref 11.6–15.4)
HCT VFR BLD AUTO: 43.3 % (ref 37.5–51)
HGB BLD-MCNC: 14.5 G/DL (ref 13–17.7)
IMM GRANULOCYTES # BLD: 0 X10E3/UL (ref 0–0.1)
IMM GRANULOCYTES NFR BLD: 0 %
LYMPHOCYTES # BLD AUTO: 2.2 X10E3/UL (ref 0.7–3.1)
LYMPHOCYTES NFR BLD AUTO: 29 %
MCH RBC QN AUTO: 29.1 PG (ref 26.6–33)
MCHC RBC AUTO-ENTMCNC: 33.5 G/DL (ref 31.5–35.7)
MCV RBC AUTO: 87 FL (ref 79–97)
MONOCYTES # BLD AUTO: 0.7 X10E3/UL (ref 0.1–0.9)
MONOCYTES NFR BLD AUTO: 9 %
NEUTROPHILS # BLD AUTO: 4.6 X10E3/UL (ref 1.4–7)
NEUTROPHILS NFR BLD AUTO: 60 %
PLATELET # BLD AUTO: 284 X10E3/UL (ref 150–450)
RBC # BLD AUTO: 4.99 X10E6/UL (ref 4.14–5.8)
WBC # BLD AUTO: 7.6 X10E3/UL (ref 3.4–10.8)

## 2024-04-16 ENCOUNTER — TELEPHONE (OUTPATIENT)
Dept: PSYCHIATRY | Facility: CLINIC | Age: 29
End: 2024-04-16

## 2024-04-16 NOTE — TELEPHONE ENCOUNTER
Called and spoke with patients mother Nela regarding her concerns. She reports that Tex had been talking to himself Friday morning, however this has since resolved and was the only episode of this. She does report at times he whispers to himself, he is always saying his head feels numb. She is wondering if he can split the clozapine to take 50mg at bedtime and 50mg in the morning, Tex is taking 100mg in the morning. Discussed he can do this, and he can also try taking it at bedtime which would be recommended. She continues to report he has gained weight on the medication, discussed with her that this writer has recommended consideration of pharmacologic interventions, increased physical activity, meeting with a nutritionist, etc, as well as preference for antipsychotic monotherapy which would likely assist with side effects, however Tex is very firm in his decision to continue with just his current medications as they are. She is going to discuss with him as well. Discussed that after next appointment we will be able to decrease frequency of blood draws to every other week.

## 2024-04-16 NOTE — TELEPHONE ENCOUNTER
"Mom LVM Friday afternoon, message not received by writer until today due to being out on medical leave. Mom would like to speak with Dr. Stockton regarding client, states, \"he is not doing well.\" Mom requested possibly scheduling an appt sometime in April, however, there is no availability prior to client's next scheduled appt on 5/1.  Please contact Mom, Nela, at your soonest convenience.   "

## 2024-04-18 ENCOUNTER — OFFICE VISIT (OUTPATIENT)
Dept: BEHAVIORAL/MENTAL HEALTH CLINIC | Facility: CLINIC | Age: 29
End: 2024-04-18
Payer: COMMERCIAL

## 2024-04-18 DIAGNOSIS — F25.0 SCHIZOAFFECTIVE DISORDER, BIPOLAR TYPE (HCC): Primary | ICD-10-CM

## 2024-04-18 PROCEDURE — 90791 PSYCH DIAGNOSTIC EVALUATION: CPT | Performed by: COUNSELOR

## 2024-04-18 NOTE — BH CRISIS PLAN
Client Name: Tex Hill       Client YOB: 1995    Rod[facility] Safety Plan      Creation Date: 4/18/24 Update Date: 4/18/25   Created By: SAQIB Smith Last Updated By: SAQIB Smith      Step 1: Warning Signs:   Warning Signs   discomfort   feeling suicidal            Step 2: Internal Coping Strategies:   Internal Coping Strategies   sleep            Step 3: People and social settings that provide distraction:   Name Contact Information   mother in cell    Places   my house           Step 4: People whom I can ask for help during a crisis:      Name Contact Information    mother in cell      Step 5: Professionals or agencies I can contact during a crisis:      Clinican/Agency Name Phone Emergency Contact    Javy Fernandez, Ph.D., [facility] [phone]       Local Emergency Department Emergency Department Phone Emergency Department Address    [address]        Crisis Phone Numbers:   Suicide Prevention Lifeline: Call or Text  340 Crisis Text Line: Text HOME to 280-674   Please note: Some [facility] do not have a separate number for Child/Adolescent specific crisis. If your county is not listed under Child/Adolescent, please call the adult number for your county      Adult Crisis Numbers: Child/Adolescent Crisis Numbers   [facility]: [phone] [facility]: [phone]   [facility]: [phone] [facility]: [phone]   [facility]: [phone] [place]: [phone]   [facility]: [phone] [facility]: [phone]   [facility]: [phone]   [facility]: [phone]   [facility]: [phone]   [facility]: [phone] (daytime) [phone] (after hours, weekends, holidays)      Step 6: Making the environment safer (plan for lethal means safety):   Patient did not identify any lethal methods: Yes     Optional: What is most important to me and worth living for?       [facility] Safety Plan. Sandie Velasco and Evaristo Hand. Used with permission of the authors.

## 2024-04-18 NOTE — PSYCH
Behavioral Health Psychotherapy Assessment    Date of Initial Psychotherapy Assessment: 04/18/24  Referral Source: Dr. Stockton  Has a release of information been signed for the referral source? Yes    Preferred Name: Tex Hill  Preferred Pronouns: He/ervin  YOB: 1995 Age: 29 y.o.  Sex assigned at birth: male   Gender Identity: Male  Race:   Preferred Language: English    Emergency Contact:  Full Name: Nela  Relationship to Client: Mother  Contact information: [phone]    Primary Care Physician:  No primary care provider on file.  No primary provider on file.  None  Has a release of information been signed? NA    Physical Health History:  Past surgical procedures: N/A  Do you have a history of any of the following: N/A  Do you have any mobility issues? No    Relevant Family History:  None    Presenting Problem (What brings you in?)  Referred by Psychiatrist     Mental Health Advance Directive:  Do you currently have a Mental Health Advance Directive?no    Diagnosis:   Diagnosis ICD-10-CM Associated Orders   1. Schizoaffective disorder, bipolar type (HCC)  F25.0           Initial Assessment:     Current Mental Status:    Appearance: appropriate and casual      Behavior/Manner: cooperative      Affect/Mood:  Labile    Speech:  Pressured    Sleep:  Normal    Oriented to: oriented to self, oriented to place and oriented to time       Clinical Symptoms    Psychosis: yes      Were you under the influence of drugs or alcohol: Yes      Have you ever been assaultive to others or the environment: No      Have you ever been self-injurious: No      Counseling History:  Previous Counseling or Treatment  (Mental Health or Drug & Alcohol): Yes    Previous Counseling Details:  Private Clinician    [facility] during 2020  Have you previously taken psychiatric medications: Yes    Previous Medications Attempted:  Invega    Suicide Risk Assessment  Have you ever had a suicide attempt: No    Have you had  incidents of suicidal ideation: No    Are you currently experiencing suicidal thoughts: No      Substance Abuse/Addiction Assessment:  Alcohol: No    Heroin: No    Fentanyl: No    Opiates: No    Cocaine: No    Amphetamines: No    Hallucinogens: No    Club Drugs: No    Benzodiazepines: Yes    Age of First Use:  22  Age of regular use:  22  Frequency:  Other  Amount:  4 bars per incident  Method:  Tablet/capsule  Other Rx Meds: No    Marijuana: Yes    Age of First Use:  19  Age of regular use:  19  Frequency:  Daily  Amount:  Few grams per day  Method:  Smoke/pipe  Last Use:  5 yrs ago  Tobacco/Nicotine: No    Have you experienced blackouts as a result of substance use: No    Have you had any periods of abstinence: Yes    Additional Abstinence information:  Since age 24 to present  Have you experienced symptoms of withdrawal: No    Have you ever overdosed on any substances?: No    Are you currently using any Medication Assisted Treatment for Substance Use: No      Disordered Eating History:  Do you have a history of disordered eating: No      Social Determinants of Health:    SDOH:  Stress    Legal History:    Have you ever been arrested  or had a DUI: No      Have you been incarcerated: No      Are you currently on parole/probation: No      Any current Children and Youth involvement: No      Any pending legal charges: No      Relationship History:    Current marital status: single      Natural Supports:  Mother    Relationship History:  None    Employment History    Are you currently employed: No      Currently seeking employment: No      Longest period of employment:  Various jobs    Future work goals:  N/A    Sources of income/financial support:  Social Security Disability (SSDI)     History:      Status: no history of  duty  Educational History:     Have you ever had an IEP or 504-plan: No      Do you need assistance with reading or writing: No      Recommended Treatment:     Psychotherapy:   Individual sessions    Frequency:  2 times    Session frequency:  Monthly    Visit start and stop times:    04/18/24

## 2024-04-23 LAB
BASOPHILS # BLD AUTO: 0.1 X10E3/UL (ref 0–0.2)
BASOPHILS NFR BLD AUTO: 1 %
EOSINOPHIL # BLD AUTO: 0 X10E3/UL (ref 0–0.4)
EOSINOPHIL NFR BLD AUTO: 0 %
ERYTHROCYTE [DISTWIDTH] IN BLOOD BY AUTOMATED COUNT: 12.6 % (ref 11.6–15.4)
HCT VFR BLD AUTO: 43.5 % (ref 37.5–51)
HGB BLD-MCNC: 14.5 G/DL (ref 13–17.7)
IMM GRANULOCYTES # BLD: 0 X10E3/UL (ref 0–0.1)
IMM GRANULOCYTES NFR BLD: 0 %
LYMPHOCYTES # BLD AUTO: 1.7 X10E3/UL (ref 0.7–3.1)
LYMPHOCYTES NFR BLD AUTO: 26 %
MCH RBC QN AUTO: 29.4 PG (ref 26.6–33)
MCHC RBC AUTO-ENTMCNC: 33.3 G/DL (ref 31.5–35.7)
MCV RBC AUTO: 88 FL (ref 79–97)
MONOCYTES # BLD AUTO: 0.6 X10E3/UL (ref 0.1–0.9)
MONOCYTES NFR BLD AUTO: 9 %
NEUTROPHILS # BLD AUTO: 4 X10E3/UL (ref 1.4–7)
NEUTROPHILS NFR BLD AUTO: 64 %
PLATELET # BLD AUTO: 291 X10E3/UL (ref 150–450)
RBC # BLD AUTO: 4.94 X10E6/UL (ref 4.14–5.8)
WBC # BLD AUTO: 6.3 X10E3/UL (ref 3.4–10.8)

## 2024-04-24 DIAGNOSIS — E55.9 VITAMIN D DEFICIENCY: ICD-10-CM

## 2024-04-24 RX ORDER — OMEGA-3S/DHA/EPA/FISH OIL/D3 300MG-1000
400 CAPSULE ORAL EVERY MORNING
Qty: 30 TABLET | Refills: 3 | Status: SHIPPED | OUTPATIENT
Start: 2024-04-24

## 2024-04-29 NOTE — PSYCH
MEDICATION MANAGEMENT NOTE      [facility]: [facility]   Mental Health Outpatient Services   [address] [place] [id number] [phone]    Name and Date of Birth:  Tex Hill 29 y.o. 1995 MRN: [id number]    Date of Visit: May 1, 2024    Reason for Visit: Follow-up visit regarding medication management     _____________________________    Assessment/Plan   Tex Hill is a 29 y.o. male, Single, domiciled with mother, on permanent disability, with past medical history of Vitamin D deficiency, prior psychiatric diagnoses of schizoaffective disorder; multiple hospitalizations and two suicide attempts (most recently 2021).  Tex was personally seen and evaluated today at the [facility] outpatient clinic for follow-up regarding medication management.     On assessment, Tex Hill reports that he has been having trouble with his head hurting which he fully attributes to clozapine. He states he has never had this before, although he has reported these symptoms in the past but states these are different symptoms now.  He denies feeling depressed or having suicidal thoughts. Endorses hearing the voice of his neighbor. We discussed the head pain symptoms and they may be consistent with tension headache or migraine, however Tex was resistant to this as a possibility. Encouraged him to try taking ibuprofen or tylenol for pain in the future and see if headache is responsive. However he is requesting to lower clozapine, otherwise he would likely stop the medication. Discussed with him that he should to try to take it at bedtime, and we can lower it to 75mg, however if he has recurrence of symptoms he needs to call to be placed back on higher dosage. Discussed his reservations about obtaining MRI, he is resistant to this, but expressed he will consider this. Encouraged him to obtain neuropsychological testing. He continues to resist  nutritional or metabolic interventions. Patient is in agreement with the treatment plan as detailed below, and agrees to call the office with any concerns or side effects between appointments.     DSM-5 Diagnoses/Visit Diagnoses:     1. Schizophrenia, unspecified type (HCC)    2. Long term current use of clozapine    3. Long term current use of antipsychotic medication    4. Homozygous MTHFR mutation [id number]    5. Drug-induced constipation      Treatment Recommendations/Precautions:  Continue psychopharmacological management as follows:  Continue risperdal 2mg HS for psychosis  Decrease Clozapine 75mg HS (pt taking in AM), for psychosis, due to patient request  Transition to every other week CBC with differential per [facility] program   Clozapine level = 70 (low) on 12/11 on 100mg dosage  Continue vitamin D supplementation for deficiency  AIMS = 0 5/1/24  Continue daily sennakot for clozapine induced constipation  Melatonin 1mg HS PRN for sleep-wake cycle regulation  Discussed he may not need melatonin if he takes clozapine and risperdal together, he may be tired enough  Labs most recently obtained March 2024, reviewed.   Recently began individual psychotherapy with Javy Fernandez  Referred for peer specialist, and psychological/neuropsychological testing for autism evaluation  Referred for MRI and encouraged this to be completed for psychosis workup and head pain/pressure symptoms  Previously referred to case management for peer specialist, on waiting list  Follow up in 4 weeks for medication management  Follow up with PCP for medical issues and ongoing care  Aware of 24 hour and weekend coverage for urgent situations accessed by calling [facility] Outpatient main practice number    Individual psychotherapy provided: No     Treatment Plan:     Completed and signed during the session: Not applicable - Treatment Plan not due at this session    Medications Risks/Benefits:      Risks, Benefits And  "Possible Side Effects Of Medications:    Risks, benefits, and possible side effects of medications explained to Tex and he verbalizes understanding and agreement for treatment.     Controlled Medication Discussion:     Not applicable    Medical Decision Making / Counseling / Coordination of Care:  The following interventions are recommended: return in 1 month for follow up.  Although patient's acute lethality risk is LOW, long-term/chronic lethality risk is mildly elevated given the risk factors listed above. However, at the current moment, Tex is future-oriented, forward-thinking, and demonstrates ability to act in a self-preserving manner as evidenced by volitionally seeking psychiatric evaluation and treatment today. To mitigate future risk, patient should adhere to treatment recommendations, avoid alcohol/illicit substance use, utilize community-based resources and familiar support, and prioritize mental health treatment. The diagnosis and treatment plan were reviewed with the patient. Risks, benefits, and alternatives to treatment were discussed. The importance of medication and treatment compliance was reviewed with the patient.     _____________________________________________    History of Present Illness     Chief Complaint: \"My head is hurting, its from the medication\"    SUBJECTIVE:    Tex Hill is a 29 y.o. male, possessing a past psychiatric history significant for schizophrenia, who was personally seen and evaluated at the [facility] outpatient clinic for follow-up regarding medication management.  At their last visit, the plan was to decrease clozapine per patient preference. Between appointments, Tex's mother called reporting his head was hurting and he had on a few instances been talking to himself.    Tex states that since their previous psychiatric appointment with this writer, he is dealing with his head hurting. When discussing this in more detail he reports a " sensation of 'numbness' (not tingling), pulsatile pressure, at base of his skull, which slows/fogs his thoughts. He does report it is painful and uncomfortable, but taking a shower makes it slightly better. It occurs shortly after taking clozapine in the morning and is not present when he awakens. He feels a heaviness in his limbs during the episode but no weakness or paresthesias. Denies gait abnormalities. He does endorse dizziness at times. He denies feeling his heart racing, denies sweating, or fevers. He has never tried any OTC pain relievers for this. Discussed to try this in the future and discussed the possibility of headaches vs migraines, however Tex insists this is the medication causing this. He has been adherent with his medications, but he does express a desire at times to stop his medication on his own, and is insisting the dosage be decreased. He is willing to try taking the medication at night. He is still hearing the voice of his neighbor bullying him, reliving those stressful experiences, feels the thoughts are somewhat intrusive and cannot make them stop. Reporting difficulty with focus and motivation. Denies thoughts to harm himself.     Presently, patient denies suicidal/homicidal ideation in addition to thoughts of self-injury.  At conclusion of evaluation, patient is amenable to the recommendations of this writer including: continue psychotropic medications as prescribed, continue therapy.  Also, patient is amenable to calling/contacting the outpatient office including this writer if any acute adverse effects of their medication regimen arise in addition to any comments or concerns pertaining to their psychiatric management.  Patient is amenable to calling/contacting crisis and/or attending to the nearest emergency department if their clinical condition deteriorates to assure their safety and stability, stating that they are able to appropriately confide in their mother regarding their  psychiatric state.    Current Rating Scores:     None completed today.    Psychiatric Review Of Systems:  Unchanged information from this writer's previous assessment is copied and italicized; information that has changed is bolded.    Appetite: denies  Adverse eating: no  Weight changes: leveled out     Insomnia/sleeplessness: sleeping  Fatigue/anergy: denies  Anhedonia/lack of interest: stable  Attention/concentration: struggling  Psychomotor agitation/retardation: no  Somatic symptoms: no  Anxiety/panic attack: denies  Mylene/hypomania: no  Hopelessness/helplessness/worthlessness: no  Self-injurious behavior/high-risk behavior: no  Suicidal ideation: no  Homicidal ideation: no  Auditory hallucinations: denies  Visual hallucinations: no  Other perceptual disturbances: no  Delusional thinking: no  Obsessive/compulsive symptoms: no    Review Of Systems:      Constitutional negative   ENT negative   Cardiovascular negative   Respiratory negative   Gastrointestinal negative   Genitourinary negative   Musculoskeletal negative   Integumentary negative   Neurological as noted in HPI   Endocrine negative   Other Symptoms none, all other systems are negative     Objective    OBJECTIVE:     Visit Vitals  Smoking Status Never      Wt Readings from Last 6 Encounters:   No data found for Wt        History reviewed. No pertinent past medical history.   History reviewed. No pertinent surgical history.    Meds/Allergies    Allergies   Allergen Reactions    Gantrisin [Sulfisoxazole] Hallucinations     Took as a child and had hallucinations per mother     Current Outpatient Medications   Medication Instructions    b complex vitamins capsule 1 capsule, Oral, Daily    cholecalciferol (VITAMIN D3) 400 Units, Oral, Every morning    clozapine (CLOZARIL) 75 mg, Oral, Daily at bedtime    melatonin 1 mg, Oral, Daily at bedtime PRN    risperiDONE (RISPERDAL) 2 mg, Oral, Daily at bedtime    senna (SENOKOT) 8.6 mg, Oral, Daily at bedtime            Mental Status Exam:    Appearance age appropriate, casually dressed, wearing at hat, seated comfortably, limited eye contact   Behavior cooperative, calm   Speech normal rate, normal volume, normal pitch   Mood euthymic   Affect normal range and intensity, appropriate   Thought Processes organized, goal directed   Associations Somewhat concrete   Thought Content no overt delusions   Perceptual Disturbances: Reports hearing voice of neighbor at times and Does not appear to be responding to internal stimuli   Abnormal Thoughts  Risk Potential Denies suicidal or homicidal ideation, plan, or intent   Orientation oriented to person, place, time/date, and situation   Memory recent and remote memory grossly intact   Consciousness alert and awake   Attention Span Concentration Span attention span and concentration are age appropriate   Intellect appears to be of average intelligence   Insight fair   Judgement fair   Muscle Strength and  Gait normal muscle strength and normal muscle tone, normal gait and normal balance   Motor Activity no abnormal movements   Language no difficulty naming common objects, no difficulty repeating a phrase, no difficulty writing a sentence   Fund of Knowledge adequate knowledge of current events  adequate fund of knowledge regarding past history  adequate fund of knowledge regarding vocabulary      Laboratory Results: I have personally reviewed all pertinent laboratory/tests results    Orders Only on 04/22/2024   Component Date Value Ref Range Status    White Blood Cell Count 04/22/2024 6.3  3.4 - 10.8 x10E3/uL Final    Red Blood Cell Count 04/22/2024 4.94  4.14 - 5.80 x10E6/uL Final    Hemoglobin 04/22/2024 14.5  13.0 - 17.7 g/dL Final    HCT 04/22/2024 43.5  37.5 - 51.0 % Final    MCV 04/22/2024 88  79 - 97 fL Final    MCH 04/22/2024 29.4  26.6 - 33.0 pg Final    MCHC 04/22/2024 33.3  31.5 - 35.7 g/dL Final    RDW 04/22/2024 12.6  11.6 - 15.4 % Final    Platelet Count 04/22/2024 291   150 - 450 x10E3/uL Final    Neutrophils 04/22/2024 64  Not Estab. % Final    Lymphocytes 04/22/2024 26  Not Estab. % Final    Monocytes 04/22/2024 9  Not Estab. % Final    Eosinophils 04/22/2024 0  Not Estab. % Final    Basophils PCT 04/22/2024 1  Not Estab. % Final    Neutrophils (Absolute) 04/22/2024 4.0  1.4 - 7.0 x10E3/uL Final    Lymphocytes (Absolute) 04/22/2024 1.7  0.7 - 3.1 x10E3/uL Final    Monocytes (Absolute) 04/22/2024 0.6  0.1 - 0.9 x10E3/uL Final    Eosinophils (Absolute) 04/22/2024 0.0  0.0 - 0.4 x10E3/uL Final    Basophils ABS 04/22/2024 0.1  0.0 - 0.2 x10E3/uL Final    Immature Granulocytes 04/22/2024 0  Not Estab. % Final    Immature Granulocytes (Absolute) 04/22/2024 0.0  0.0 - 0.1 x10E3/uL Final   Orders Only on 04/15/2024   Component Date Value Ref Range Status    White Blood Cell Count 04/15/2024 7.6  3.4 - 10.8 x10E3/uL Final    Red Blood Cell Count 04/15/2024 4.99  4.14 - 5.80 x10E6/uL Final    Hemoglobin 04/15/2024 14.5  13.0 - 17.7 g/dL Final    HCT 04/15/2024 43.3  37.5 - 51.0 % Final    MCV 04/15/2024 87  79 - 97 fL Final    MCH 04/15/2024 29.1  26.6 - 33.0 pg Final    MCHC 04/15/2024 33.5  31.5 - 35.7 g/dL Final    RDW 04/15/2024 12.6  11.6 - 15.4 % Final    Platelet Count 04/15/2024 284  150 - 450 x10E3/uL Final    Neutrophils 04/15/2024 60  Not Estab. % Final    Lymphocytes 04/15/2024 29  Not Estab. % Final    Monocytes 04/15/2024 9  Not Estab. % Final    Eosinophils 04/15/2024 1  Not Estab. % Final    Basophils PCT 04/15/2024 1  Not Estab. % Final    Neutrophils (Absolute) 04/15/2024 4.6  1.4 - 7.0 x10E3/uL Final    Lymphocytes (Absolute) 04/15/2024 2.2  0.7 - 3.1 x10E3/uL Final    Monocytes (Absolute) 04/15/2024 0.7  0.1 - 0.9 x10E3/uL Final    Eosinophils (Absolute) 04/15/2024 0.1  0.0 - 0.4 x10E3/uL Final    Basophils ABS 04/15/2024 0.1  0.0 - 0.2 x10E3/uL Final    Immature Granulocytes 04/15/2024 0  Not Estab. % Final    Immature Granulocytes (Absolute) 04/15/2024 0.0  0.0 - 0.1  x10E3/uL Final   Ancillary Orders on 04/12/2024   Component Date Value Ref Range Status    White Blood Cell Count 04/29/2024 6.3  3.4 - 10.8 x10E3/uL Final    Red Blood Cell Count 04/29/2024 4.99  4.14 - 5.80 x10E6/uL Final    Hemoglobin 04/29/2024 14.8  13.0 - 17.7 g/dL Final    HCT 04/29/2024 42.6  37.5 - 51.0 % Final    MCV 04/29/2024 85  79 - 97 fL Final    MCH 04/29/2024 29.7  26.6 - 33.0 pg Final    MCHC 04/29/2024 34.7  31.5 - 35.7 g/dL Final    RDW 04/29/2024 12.7  11.6 - 15.4 % Final    Platelet Count 04/29/2024 297  150 - 450 x10E3/uL Final    Neutrophils 04/29/2024 62  Not Estab. % Final    Lymphocytes 04/29/2024 27  Not Estab. % Final    Monocytes 04/29/2024 10  Not Estab. % Final    Eosinophils 04/29/2024 0  Not Estab. % Final    Basophils PCT 04/29/2024 1  Not Estab. % Final    Neutrophils (Absolute) 04/29/2024 3.8  1.4 - 7.0 x10E3/uL Final    Lymphocytes (Absolute) 04/29/2024 1.7  0.7 - 3.1 x10E3/uL Final    Monocytes (Absolute) 04/29/2024 0.6  0.1 - 0.9 x10E3/uL Final    Eosinophils (Absolute) 04/29/2024 0.0  0.0 - 0.4 x10E3/uL Final    Basophils ABS 04/29/2024 0.1  0.0 - 0.2 x10E3/uL Final    Immature Granulocytes 04/29/2024 0  Not Estab. % Final    Immature Granulocytes (Absolute) 04/29/2024 0.0  0.0 - 0.1 x10E3/uL Final   Orders Only on 04/08/2024   Component Date Value Ref Range Status    White Blood Cell Count 04/08/2024 6.9  3.4 - 10.8 x10E3/uL Final    Red Blood Cell Count 04/08/2024 5.06  4.14 - 5.80 x10E6/uL Final    Hemoglobin 04/08/2024 14.8  13.0 - 17.7 g/dL Final    HCT 04/08/2024 42.4  37.5 - 51.0 % Final    MCV 04/08/2024 84  79 - 97 fL Final    MCH 04/08/2024 29.2  26.6 - 33.0 pg Final    MCHC 04/08/2024 34.9  31.5 - 35.7 g/dL Final    RDW 04/08/2024 12.6  11.6 - 15.4 % Final    Platelet Count 04/08/2024 283  150 - 450 x10E3/uL Final    Neutrophils 04/08/2024 62  Not Estab. % Final    Lymphocytes 04/08/2024 26  Not Estab. % Final    Monocytes 04/08/2024 10  Not Estab. % Final     Eosinophils 04/08/2024 1  Not Estab. % Final    Basophils PCT 04/08/2024 1  Not Estab. % Final    Neutrophils (Absolute) 04/08/2024 4.3  1.4 - 7.0 x10E3/uL Final    Lymphocytes (Absolute) 04/08/2024 1.8  0.7 - 3.1 x10E3/uL Final    Monocytes (Absolute) 04/08/2024 0.7  0.1 - 0.9 x10E3/uL Final    Eosinophils (Absolute) 04/08/2024 0.0  0.0 - 0.4 x10E3/uL Final    Basophils ABS 04/08/2024 0.1  0.0 - 0.2 x10E3/uL Final    Immature Granulocytes 04/08/2024 0  Not Estab. % Final    Immature Granulocytes (Absolute) 04/08/2024 0.0  0.0 - 0.1 x10E3/uL Final   Ancillary Orders on 03/29/2024   Component Date Value Ref Range Status    White Blood Cell Count 04/01/2024 7.1  3.4 - 10.8 x10E3/uL Final    Red Blood Cell Count 04/01/2024 4.96  4.14 - 5.80 x10E6/uL Final    Hemoglobin 04/01/2024 14.3  13.0 - 17.7 g/dL Final    HCT 04/01/2024 42.6  37.5 - 51.0 % Final    MCV 04/01/2024 86  79 - 97 fL Final    MCH 04/01/2024 28.8  26.6 - 33.0 pg Final    MCHC 04/01/2024 33.6  31.5 - 35.7 g/dL Final    RDW 04/01/2024 12.8  11.6 - 15.4 % Final    Platelet Count 04/01/2024 292  150 - 450 x10E3/uL Final    Neutrophils 04/01/2024 59  Not Estab. % Final    Lymphocytes 04/01/2024 29  Not Estab. % Final    Monocytes 04/01/2024 10  Not Estab. % Final    Eosinophils 04/01/2024 1  Not Estab. % Final    Basophils PCT 04/01/2024 1  Not Estab. % Final    Neutrophils (Absolute) 04/01/2024 4.2  1.4 - 7.0 x10E3/uL Final    Lymphocytes (Absolute) 04/01/2024 2.1  0.7 - 3.1 x10E3/uL Final    Monocytes (Absolute) 04/01/2024 0.7  0.1 - 0.9 x10E3/uL Final    Eosinophils (Absolute) 04/01/2024 0.1  0.0 - 0.4 x10E3/uL Final    Basophils ABS 04/01/2024 0.1  0.0 - 0.2 x10E3/uL Final    Immature Granulocytes 04/01/2024 0  Not Estab. % Final    Immature Granulocytes (Absolute) 04/01/2024 0.0  0.0 - 0.1 x10E3/uL Final   Orders Only on 03/25/2024   Component Date Value Ref Range Status    White Blood Cell Count 03/25/2024 6.9  3.4 - 10.8 x10E3/uL Final    Red Blood  Cell Count 03/25/2024 5.06  4.14 - 5.80 x10E6/uL Final    Hemoglobin 03/25/2024 14.4  13.0 - 17.7 g/dL Final    HCT 03/25/2024 43.7  37.5 - 51.0 % Final    MCV 03/25/2024 86  79 - 97 fL Final    MCH 03/25/2024 28.5  26.6 - 33.0 pg Final    MCHC 03/25/2024 33.0  31.5 - 35.7 g/dL Final    RDW 03/25/2024 12.3  11.6 - 15.4 % Final    Platelet Count 03/25/2024 284  150 - 450 x10E3/uL Final    Neutrophils 03/25/2024 60  Not Estab. % Final    Lymphocytes 03/25/2024 28  Not Estab. % Final    Monocytes 03/25/2024 10  Not Estab. % Final    Eosinophils 03/25/2024 1  Not Estab. % Final    Basophils PCT 03/25/2024 1  Not Estab. % Final    Neutrophils (Absolute) 03/25/2024 4.2  1.4 - 7.0 x10E3/uL Final    Lymphocytes (Absolute) 03/25/2024 2.0  0.7 - 3.1 x10E3/uL Final    Monocytes (Absolute) 03/25/2024 0.7  0.1 - 0.9 x10E3/uL Final    Eosinophils (Absolute) 03/25/2024 0.1  0.0 - 0.4 x10E3/uL Final    Basophils ABS 03/25/2024 0.1  0.0 - 0.2 x10E3/uL Final    Immature Granulocytes 03/25/2024 0  Not Estab. % Final    Immature Granulocytes (Absolute) 03/25/2024 0.0  0.0 - 0.1 x10E3/uL Final   Orders Only on 03/18/2024   Component Date Value Ref Range Status    White Blood Cell Count 03/18/2024 7.1  3.4 - 10.8 x10E3/uL Final    Red Blood Cell Count 03/18/2024 4.85  4.14 - 5.80 x10E6/uL Final    Hemoglobin 03/18/2024 13.9  13.0 - 17.7 g/dL Final    HCT 03/18/2024 41.3  37.5 - 51.0 % Final    MCV 03/18/2024 85  79 - 97 fL Final    MCH 03/18/2024 28.7  26.6 - 33.0 pg Final    MCHC 03/18/2024 33.7  31.5 - 35.7 g/dL Final    RDW 03/18/2024 12.4  11.6 - 15.4 % Final    Platelet Count 03/18/2024 308  150 - 450 x10E3/uL Final    Neutrophils 03/18/2024 63  Not Estab. % Final    Lymphocytes 03/18/2024 25  Not Estab. % Final    Monocytes 03/18/2024 9  Not Estab. % Final    Eosinophils 03/18/2024 2  Not Estab. % Final    Basophils PCT 03/18/2024 1  Not Estab. % Final    Neutrophils (Absolute) 03/18/2024 4.4  1.4 - 7.0 x10E3/uL Final     Lymphocytes (Absolute) 03/18/2024 1.8  0.7 - 3.1 x10E3/uL Final    Monocytes (Absolute) 03/18/2024 0.7  0.1 - 0.9 x10E3/uL Final    Eosinophils (Absolute) 03/18/2024 0.1  0.0 - 0.4 x10E3/uL Final    Basophils ABS 03/18/2024 0.1  0.0 - 0.2 x10E3/uL Final    Immature Granulocytes 03/18/2024 0  Not Estab. % Final    Immature Granulocytes (Absolute) 03/18/2024 0.0  0.0 - 0.1 x10E3/uL Final   Ancillary Orders on 03/08/2024   Component Date Value Ref Range Status    White Blood Cell Count 03/11/2024 8.1  3.4 - 10.8 x10E3/uL Final    Red Blood Cell Count 03/11/2024 4.95  4.14 - 5.80 x10E6/uL Final    Hemoglobin 03/11/2024 14.4  13.0 - 17.7 g/dL Final    HCT 03/11/2024 42.4  37.5 - 51.0 % Final    MCV 03/11/2024 86  79 - 97 fL Final    MCH 03/11/2024 29.1  26.6 - 33.0 pg Final    MCHC 03/11/2024 34.0  31.5 - 35.7 g/dL Final    RDW 03/11/2024 12.9  11.6 - 15.4 % Final    Platelet Count 03/11/2024 287  150 - 450 x10E3/uL Final    Neutrophils 03/11/2024 64  Not Estab. % Final    Lymphocytes 03/11/2024 23  Not Estab. % Final    Monocytes 03/11/2024 9  Not Estab. % Final    Eosinophils 03/11/2024 3  Not Estab. % Final    Basophils PCT 03/11/2024 1  Not Estab. % Final    Neutrophils (Absolute) 03/11/2024 5.2  1.4 - 7.0 x10E3/uL Final    Lymphocytes (Absolute) 03/11/2024 1.9  0.7 - 3.1 x10E3/uL Final    Monocytes (Absolute) 03/11/2024 0.7  0.1 - 0.9 x10E3/uL Final    Eosinophils (Absolute) 03/11/2024 0.2  0.0 - 0.4 x10E3/uL Final    Basophils ABS 03/11/2024 0.1  0.0 - 0.2 x10E3/uL Final    Immature Granulocytes 03/11/2024 0  Not Estab. % Final    Immature Granulocytes (Absolute) 03/11/2024 0.0  0.0 - 0.1 x10E3/uL Final   Orders Only on 03/04/2024   Component Date Value Ref Range Status    White Blood Cell Count 03/04/2024 6.8  3.4 - 10.8 x10E3/uL Final    Red Blood Cell Count 03/04/2024 4.80  4.14 - 5.80 x10E6/uL Final    Hemoglobin 03/04/2024 14.2  13.0 - 17.7 g/dL Final    HCT 03/04/2024 41.8  37.5 - 51.0 % Final    MCV  03/04/2024 87  79 - 97 fL Final    MCH 03/04/2024 29.6  26.6 - 33.0 pg Final    MCHC 03/04/2024 34.0  31.5 - 35.7 g/dL Final    RDW 03/04/2024 12.8  11.6 - 15.4 % Final    Platelet Count 03/04/2024 311  150 - 450 x10E3/uL Final    Neutrophils 03/04/2024 60  Not Estab. % Final    Lymphocytes 03/04/2024 25  Not Estab. % Final    Monocytes 03/04/2024 10  Not Estab. % Final    Eosinophils 03/04/2024 4  Not Estab. % Final    Basophils PCT 03/04/2024 1  Not Estab. % Final    Neutrophils (Absolute) 03/04/2024 4.1  1.4 - 7.0 x10E3/uL Final    Lymphocytes (Absolute) 03/04/2024 1.7  0.7 - 3.1 x10E3/uL Final    Monocytes (Absolute) 03/04/2024 0.7  0.1 - 0.9 x10E3/uL Final    Eosinophils (Absolute) 03/04/2024 0.2  0.0 - 0.4 x10E3/uL Final    Basophils ABS 03/04/2024 0.1  0.0 - 0.2 x10E3/uL Final    Immature Granulocytes 03/04/2024 0  Not Estab. % Final    Immature Granulocytes (Absolute) 03/04/2024 0.0  0.0 - 0.1 x10E3/uL Final   Orders Only on 02/27/2024   Component Date Value Ref Range Status    White Blood Cell Count 02/27/2024 8.9  3.4 - 10.8 x10E3/uL Final    Red Blood Cell Count 02/27/2024 4.61  4.14 - 5.80 x10E6/uL Final    Hemoglobin 02/27/2024 13.6  13.0 - 17.7 g/dL Final    HCT 02/27/2024 39.0  37.5 - 51.0 % Final    MCV 02/27/2024 85  79 - 97 fL Final    MCH 02/27/2024 29.5  26.6 - 33.0 pg Final    MCHC 02/27/2024 34.9  31.5 - 35.7 g/dL Final    RDW 02/27/2024 12.8  11.6 - 15.4 % Final    Platelet Count 02/27/2024 304  150 - 450 x10E3/uL Final    Neutrophils 02/27/2024 67  Not Estab. % Final    Lymphocytes 02/27/2024 19  Not Estab. % Final    Monocytes 02/27/2024 10  Not Estab. % Final    Eosinophils 02/27/2024 3  Not Estab. % Final    Basophils PCT 02/27/2024 1  Not Estab. % Final    Neutrophils (Absolute) 02/27/2024 6.0  1.4 - 7.0 x10E3/uL Final    Lymphocytes (Absolute) 02/27/2024 1.7  0.7 - 3.1 x10E3/uL Final    Monocytes (Absolute) 02/27/2024 0.9  0.1 - 0.9 x10E3/uL Final    Eosinophils (Absolute) 02/27/2024 0.3  " 0.0 - 0.4 x10E3/uL Final    Basophils ABS 02/27/2024 0.1  0.0 - 0.2 x10E3/uL Final    Immature Granulocytes 02/27/2024 0  Not Estab. % Final    Immature Granulocytes (Absolute) 02/27/2024 0.0  0.0 - 0.1 x10E3/uL Final   There may be more visits with results that are not included.             ___________________________________    History Review: The following portions of the patient's history were reviewed and updated as appropriate: allergies, current medications, past family history, past medical history, past social history, past surgical history, and problem list.    Unchanged information from this writer's previous assessment is copied and italicized; information that has changed is bolded.    Past Psychiatric History:      Past psychiatric diagnoses:   Schizophrenia, schizoaffective d/o, anxiety, depression   Inpatient psychiatric admissions:   10 times total (4 were involuntary)  First - age 22 after stealing sisters klonopin  Most common reason: first few were \"no reason\"  2017 - got invega shot was a very influential moment  2 suicide attempts/SI: SI and wanting to jump off a roof (2021) a few months later after sleeping pills, tried to hang himself 2 years ago  Psychosis 3 times (most recent for that reason was 3 years)  Most recent was 2 years ago  Has stayed out of the hospital due to \"taking his medications, not having suicidal ideation\"  Denies history of ECT, denies having had ICM in past.   Prior outpatient psychiatric treatment:   Saw Chilo Ramsey for 3 years  Saw psychiatrist at [facility] for a year before that  Past/current psychotherapy:   Worked with a therapist at [facility], stopped seeing 1-2 years ago because he left the practice  History of suicidal attempts/gestures:   2: took sleeping pills (August 2021), tried to hang self two years  History of non-suicidal self-injurious behavior:   None  History of violence/aggressive behaviors:   None  Psychotropic medication trials:   Invega, " "(discomfort) risperdal, zyprexa, thorazine (all unknown reasons for discontinuation)  Abilify (bad reaction)  Invega SCHAEFER x1 in 2019 (\"hurt his head\")  Ativan ('caused psychosis')  Loxapine - feeling too tired on dosages above 20mg  Risperdal - above 2mg causes nausea and \"hurting\" in his head  Clozapine - current medication  Melatonin - current medication  Substance abuse inpatient/outpatient rehabilitation:   Rehab in 2017 - marijuana use  Eating disorder history:   No  Other services: used to have a  but reports he does not anymore because they ran out of things to do together      Substance Abuse History:     States he was smoking a lot of marijuana for 5 years, nearly every day, stopped smoking 2019 July after he \"freaked out\" and thought \"there were people in his basement and living in his house and torturing him. \"     Denies any other recreational drug use and otherwise denies history of alcohol, illict substance, or tobacco abuse., Patient denies previous legal actions or arrests related to substance intoxication including prior DWIs/DUIs., Patient does not exhibit objective evidence of substance withdrawal during today's examination nor do they appear under the influence of any psychoactive substance.       Family Psychiatric History:      Family History   History reviewed. No pertinent family history.         Psychiatric Family History: Per patient he is adopted,  Per mother is not adopted. Maternal grandfather - schizophrenia  Tex's half sister has bipolar disorder     Social History:     Developmental: Was in special education, denies IEP  Per care everywhere, mild developmental delay, early intervention at age 2.5. Concern for autism, diagnosed with ADHD.  Has 2 sister (50 and 34 years old), states he was adopted but his mother reports he is not adopted  Education: high school diploma/GED  Marital history: single  Children: none  Living arrangement, social support: Family is supportive, " "mom is supportive. Lives with mother, on a waiting list for housing, on disability for schizoaffective disorder.   Dad lives in [place]   Occupational History: Permanent disability. 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 , Patient denies history of arrests or violence with a deadly weapon.    history: None     Traumatic History:      Abuse: Denies  Other Traumatic Events: Bullying, and would not disclose further.  ___________________________________      Visit Time    Visit Start Time: 2:50  Visit Stop Time: 3:15  Total Visit Duration:  25 minutes    Lauren Stockton MD   05/01/24    "

## 2024-05-01 ENCOUNTER — OFFICE VISIT (OUTPATIENT)
Dept: PSYCHIATRY | Facility: CLINIC | Age: 29
End: 2024-05-01

## 2024-05-01 DIAGNOSIS — Z79.899 LONG TERM CURRENT USE OF CLOZAPINE: ICD-10-CM

## 2024-05-01 DIAGNOSIS — Z79.899 LONG TERM CURRENT USE OF ANTIPSYCHOTIC MEDICATION: ICD-10-CM

## 2024-05-01 DIAGNOSIS — K59.03 DRUG-INDUCED CONSTIPATION: ICD-10-CM

## 2024-05-01 DIAGNOSIS — Z15.89 HOMOZYGOUS MTHFR MUTATION C677T: ICD-10-CM

## 2024-05-01 DIAGNOSIS — F20.9 SCHIZOPHRENIA, UNSPECIFIED TYPE (HCC): Primary | ICD-10-CM

## 2024-05-01 PROCEDURE — NC001 PR NO CHARGE: Performed by: PSYCHIATRY & NEUROLOGY

## 2024-05-01 RX ORDER — CLOZAPINE 50 MG/1
75 TABLET ORAL
Qty: 45 TABLET | Refills: 1 | Status: SHIPPED | OUTPATIENT
Start: 2024-05-01

## 2024-05-01 RX ORDER — SENNOSIDES 8.6 MG
8.6 TABLET ORAL
Qty: 30 TABLET | Refills: 2 | Status: SHIPPED | OUTPATIENT
Start: 2024-05-01

## 2024-05-01 RX ORDER — VITAMIN B COMPLEX
1 CAPSULE ORAL DAILY
Qty: 30 CAPSULE | Refills: 3 | Status: SHIPPED | OUTPATIENT
Start: 2024-05-01

## 2024-05-01 RX ORDER — RISPERIDONE 2 MG/1
2 TABLET ORAL
Qty: 30 TABLET | Refills: 1 | Status: SHIPPED | OUTPATIENT
Start: 2024-05-01

## 2024-05-01 NOTE — PATIENT INSTRUCTIONS
Tex requested we lower his medication to clozapine 75mg. PLEASE start taking this at bedtime to reduce tiredness  You can start getting blood work every other week now  If symptoms of psychosis worsen or recur please call and let me know and we can go back up on the medication  Please obtain MRI  Please obtain neuropsychological testing

## 2024-05-02 ENCOUNTER — SOCIAL WORK (OUTPATIENT)
Dept: BEHAVIORAL/MENTAL HEALTH CLINIC | Facility: CLINIC | Age: 29
End: 2024-05-02
Payer: COMMERCIAL

## 2024-05-02 DIAGNOSIS — F25.0 SCHIZOAFFECTIVE DISORDER, BIPOLAR TYPE (HCC): Primary | ICD-10-CM

## 2024-05-02 PROCEDURE — 90834 PSYTX W PT 45 MINUTES: CPT | Performed by: COUNSELOR

## 2024-05-02 NOTE — PSYCH
Behavioral Health Psychotherapy Progress Note    Psychotherapy Provided: Individual Psychotherapy     1. Schizoaffective disorder, bipolar type (HCC)            Goals addressed in session: Goal 1     DATA: Met with Tex who reports that he was having problems with being tired on his one medication. He saw his Psychiatrist (Dr Stockton) yesterday 5/01/2024 where she lower the Clozaril. He reports feeling some difference but still tired so in can not do any activities that he enjoys until he starts to feel less sedated. He is aware this may take a few days to adjust. He also spoke with regards to having an overall feeling of numbness around his genital area that he attributed to when he was prescribed a injectable (he thinks it was Invega). We spoke about this along with him identifying stress from his past that he has trouble erasing the thoughts. He was not open to any reading material on Grounding to assist with helping his de-stress, so I attempted to have him explore REBT techniques of challenging his thought process in order to change the scrip and to stop looking into the rear view mirror of his past but rather look through the windshield in order to move forward.  He is willing to write /jot down times when he is feeling stress in order for use to review and explore themes based on his activity of that day or days/weeks prior.   During this session, this clinician used the following therapeutic modalities: Client-centered Therapy and Cognitive Behavioral Therapy    Substance Abuse was not addressed during this session. If the client is diagnosed with a co-occurring substance use disorder, please indicate any changes in the frequency or amount of use: Stage of change for addressing substance use diagnoses: No substance use/Not applicable    ASSESSMENT:  Tex Hill presents with a Dysthymic mood.     his affect is Normal range and intensity, which is congruent, with his mood and the content of the session.  "The client has made progress on their goals.    Tex Hill presents with a none risk of suicide, none risk of self-harm, and none risk of harm to others.    For any risk assessment that surpasses a \"low\" rating, a safety plan must be developed.    A safety plan was indicated: no  If yes, describe in detail N/A    PLAN: Between sessions, Tex Hill will jot times and day when stress/ thoughts occurs and share in next session. At the next session, the therapist will use Client-centered Therapy and Cognitive Behavioral Therapy to address Self-care.    Behavioral Health Treatment Plan and Discharge Planning: Tex Hill is aware of and agrees to continue to work on their treatment plan. They have identified and are working toward their discharge goals. yes    Visit start and stop times:    05/02/24  Start Time: 1243  Stop Time: 1330  Total Visit Time: 47 minutes  "

## 2024-05-13 LAB
BASOPHILS # BLD AUTO: 0.1 X10E3/UL (ref 0–0.2)
BASOPHILS NFR BLD AUTO: 1 %
EOSINOPHIL # BLD AUTO: 0 X10E3/UL (ref 0–0.4)
EOSINOPHIL NFR BLD AUTO: 0 %
ERYTHROCYTE [DISTWIDTH] IN BLOOD BY AUTOMATED COUNT: 12.9 % (ref 11.6–15.4)
HCT VFR BLD AUTO: 43.1 % (ref 37.5–51)
HGB BLD-MCNC: 14.3 G/DL (ref 13–17.7)
IMM GRANULOCYTES # BLD: 0 X10E3/UL (ref 0–0.1)
IMM GRANULOCYTES NFR BLD: 0 %
LYMPHOCYTES # BLD AUTO: 1.9 X10E3/UL (ref 0.7–3.1)
LYMPHOCYTES NFR BLD AUTO: 26 %
MCH RBC QN AUTO: 28.4 PG (ref 26.6–33)
MCHC RBC AUTO-ENTMCNC: 33.2 G/DL (ref 31.5–35.7)
MCV RBC AUTO: 86 FL (ref 79–97)
MONOCYTES # BLD AUTO: 0.7 X10E3/UL (ref 0.1–0.9)
MONOCYTES NFR BLD AUTO: 9 %
NEUTROPHILS # BLD AUTO: 4.5 X10E3/UL (ref 1.4–7)
NEUTROPHILS NFR BLD AUTO: 64 %
PLATELET # BLD AUTO: 284 X10E3/UL (ref 150–450)
RBC # BLD AUTO: 5.04 X10E6/UL (ref 4.14–5.8)
WBC # BLD AUTO: 7.1 X10E3/UL (ref 3.4–10.8)

## 2024-05-16 ENCOUNTER — DOCUMENTATION (OUTPATIENT)
Dept: BEHAVIORAL/MENTAL HEALTH CLINIC | Facility: CLINIC | Age: 29
End: 2024-05-16

## 2024-05-16 ENCOUNTER — SOCIAL WORK (OUTPATIENT)
Dept: BEHAVIORAL/MENTAL HEALTH CLINIC | Facility: CLINIC | Age: 29
End: 2024-05-16
Payer: COMMERCIAL

## 2024-05-16 ENCOUNTER — TELEPHONE (OUTPATIENT)
Dept: PSYCHIATRY | Facility: CLINIC | Age: 29
End: 2024-05-16

## 2024-05-16 DIAGNOSIS — F25.0 SCHIZOAFFECTIVE DISORDER, BIPOLAR TYPE (HCC): Primary | ICD-10-CM

## 2024-05-16 PROCEDURE — 90834 PSYTX W PT 45 MINUTES: CPT | Performed by: COUNSELOR

## 2024-05-16 NOTE — PSYCH
"Behavioral Health Psychotherapy Progress Note    Psychotherapy Provided: Individual Psychotherapy     1. Schizoaffective disorder, bipolar type (HCC)            Goals addressed in session: Goal 1     DATA: Met with Tex and used the session time to discuss him having a plan if he wants to move out of his mothers home and live by himself. He reports that he is looking but that he would be okay with just renting a room because of the amount of income he gets would not meet the expectation of most landlords. He continues to talk about his medication caused sexual dysfunction side effects but is also aware that there is a great benefit to him taking the meds. He also reflected back on a time when he had his wisdom teeth removed and was prescribed Vicodin and that he began to like it too much to where he projected that had that behavior continued, he'd be an addict now. Discussed a transfer with Tex to another therapist since this writer will be transferring to [facility]. He prefers a female therapist.    During this session, this clinician used the following therapeutic modalities: Client-centered Therapy    Substance Abuse was addressed during this session. If the client is diagnosed with a co-occurring substance use disorder, please indicate any changes in the frequency or amount of use:  Stage of change for addressing substance use diagnoses: Maintenance    ASSESSMENT:  Tex Hill presents with a Euthymic/ normal mood.     his affect is Normal range and intensity, which is congruent, with his mood and the content of the session. The client has made progress on their goals.     Tex Hill presents with a none risk of suicide, none risk of self-harm, and none risk of harm to others.    For any risk assessment that surpasses a \"low\" rating, a safety plan must be developed.    A safety plan was indicated: no  If yes, describe in detail N/A    PLAN: Between sessions, Tex Hill will continue to " explore what is needed for living on his own. At the next session, the therapist will use Client-centered Therapy to address possibly involving case management with assistance of moving.    Behavioral Health Treatment Plan and Discharge Planning: Tex Hill is aware of and agrees to continue to work on their treatment plan. They have identified and are working toward their discharge goals. yes    Visit start and stop times:    05/16/24  Start Time: 1112  Stop Time: 1150  Total Visit Time: 38 minutes

## 2024-05-16 NOTE — PROGRESS NOTES
TRANSFER SUMMARY    [facility]    Patient Name Tex Hill     Date of Birth: 29 y.o. 1995      MRN: [id number]    Admission Date:  4/18/2024    Date of Transfer: May 16, 2024    Admission Diagnosis:     Schizophrenia    Current Diagnosis:     Schizophrenia    Reason for Admission: Tex presented for treatment due to depression, anxiety symptoms, unstable mood, and psychotic symptoms. Primary complaints included DEPRESSIVE SYMPTOMS: unremarkable - euthymic mood, ANXIETY SYMPTOMS: feeling nervous, and MOOD INSTABILITY SYMPTOMS: unremarkable.     Progress in Treatment: Tex was seen for Psychiatric Evaluation, Medication Management, and Individual Couseling. During the course of treatment he compliant with attendance and session .    Episodes of Higher Level of Care: No    Transfer request Initiated by: Psychiatrist: Therapist:  Javy Fernandez    Reason for Transfer Request:  Therapist transferring to [facility]    Does this individual need a clinician with specialized training/expertise?: No    Is this client working with any other [facility] Providers/Therapists? Psychiatrist: Dr. Lauren Stockton  Therapist: None    Other pertinent issues: None    Are there any specific individuals who would be a “best fit” or who have already agreed to accept this transfer request?      Psychiatrist: Dr. Lauren Stockton    Therapist:  Prefers a female  Rationale: Patient prefers female clinician    Attempts to maintain the current therapeutic relationship: No    Transfer request routed to Clinical Coordinator for input and/or approval.     Comments from other involved providers and/or clinical coordinator : None    Javy Fernandez, 05/16/24

## 2024-05-16 NOTE — TELEPHONE ENCOUNTER
Spoke with patient in regards to transferring from [facility]. Patient has been scheduled with Eleni on 5/30 @ 11am.

## 2024-05-28 NOTE — PSYCH
MEDICATION MANAGEMENT NOTE      [facility]: [facility] Emergency Department   Mental Health Outpatient Services   [address] [place] [id number]   [phone]    Name and Date of Birth:  Tex Hill 29 y.o. 1995 MRN: [id number]    Date of Visit: May 29, 2024    Reason for Visit: Follow-up visit regarding medication management     _____________________________    Assessment & Plan   Tex Hill is a 29 y.o. male, Single, domiciled with mother, on permanent disability, with past medical history of Vitamin D deficiency, prior psychiatric diagnoses of schizoaffective disorder; multiple hospitalizations and two suicide attempts (most recently 2021).  Tex was personally seen and evaluated today at the [facility] outpatient clinic for follow-up regarding medication management.      On assessment, Tex Hill reports things are going well. He has been adherent with his medication and has switched clozapine to HS dosing. He has found significant relief in sedation and head pain with this adjustment. He feels the clozapine is effective at this low dosage. He reports recent weight loss and appetite decreasing with dose decrease. He has been taking his other medications as prescribed, denies side effects such as constipation, abnormal movements. He denies feeling down, depressed, or anxious, and denies thoughts to harm himself. He denies any recent hallucinations or perceptual disturbances. He continues to decline reducing risperdal and increasing clozapine in favor of medication monotherapy, despite education. He continues to decline MRI. Patient is in agreement with the treatment plan as detailed below, and agrees to call the office with any concerns or side effects between appointments.     DSM-5 Diagnoses/Visit Diagnoses:     1. Schizophrenia, unspecified type (HCC)      Treatment Recommendations/Precautions:  Continue psychopharmacological management as  follows:  Continue risperdal 2mg HS for psychosis  Clozapine 75mg HS (pt taking in AM), for psychosis   CBC with differential every other week per [facility] program   Clozapine level = 70 (low) on 12/11 on 100mg dosage  AIMS = 0 on 5/1/24  Continue vitamin D supplementation for deficiency  Continue daily sennakot for clozapine induced constipation  Melatonin 1mg HS PRN for sleep-wake cycle regulation  Labs most recently obtained March 2024, reviewed.   Continue individual psychotherapy, transitioning therapists   Referred for peer specialist, and psychological/neuropsychological testing for autism evaluation  Referred for MRI and encouraged this to be completed for psychosis workup and head pain/pressure symptoms  Previously referred to case management for peer specialist, on waiting list  Follow up in 4-6 weeks for medication management  Follow up with PCP for medical issues and ongoing care  Aware of 24 hour and weekend coverage for urgent situations accessed by calling [facility] Outpatient main practice number    Individual psychotherapy provided: No     Treatment Plan:     Completed and signed during the session: Not applicable - Treatment Plan not due at this session    Medications Risks/Benefits:      Risks, Benefits And Possible Side Effects Of Medications:    Risks, benefits, and possible side effects of medications explained to Tex and he verbalizes understanding and agreement for treatment.     Controlled Medication Discussion:     Not applicable    Medical Decision Making / Counseling / Coordination of Care:  The following interventions are recommended: return in 1 month for follow up.  Although patient's acute lethality risk is LOW, long-term/chronic lethality risk is mildly elevated given the risk factors listed above. However, at the current moment, Tex is future-oriented, forward-thinking, and demonstrates ability to act in a self-preserving manner as evidenced by  "volitionally seeking psychiatric evaluation and treatment today. To mitigate future risk, patient should adhere to treatment recommendations, avoid alcohol/illicit substance use, utilize community-based resources and familiar support, and prioritize mental health treatment. The diagnosis and treatment plan were reviewed with the patient. Risks, benefits, and alternatives to treatment were discussed. The importance of medication and treatment compliance was reviewed with the patient.     _____________________________________________    History of Present Illness     Chief Complaint: \"I'm good\"    SUBJECTIVE:    Tex Hill is a 29 y.o. male, possessing a past psychiatric history significant for schizophrenia, who was personally seen and evaluated at the [facility] outpatient clinic for follow-up regarding medication management.  At their last visit, the plan was to decrease clozapine per patient preference.    Tex states that since their previous psychiatric appointment with this writer, he feels he has been doing well overall. He reports he has been pacing sometimes, walks outside, not talking to himself, and is hearing the voice of his bully less often. He is sleeping well, waking up a couple times but able to fall back asleep. He feels taking clozapine at night has been better for his daily energy levels, and denies sedation. He states his head has not been bothering him much recently. He denies thoughts to harm himself or others,denies hallucinations, and denies feeling down, depressed, or hopeless.    Presently, patient denies suicidal/homicidal ideation in addition to thoughts of self-injury.  At conclusion of evaluation, patient is amenable to the recommendations of this writer including: continue psychotropic medications as prescribed.  Also, patient is amenable to calling/contacting the outpatient office including this writer if any acute adverse effects of their medication regimen " arise in addition to any comments or concerns pertaining to their psychiatric management.  Patient is amenable to calling/contacting crisis and/or attending to the nearest emergency department if their clinical condition deteriorates to assure their safety and stability, stating that they are able to appropriately confide in their mother regarding their psychiatric state.    Current Rating Scores:     None completed today.    Psychiatric Review Of Systems:  Unchanged information from this writer's previous assessment is copied and italicized; information that has changed is bolded.    Appetite: decreased recently  Adverse eating: no  Weight changes: lost ten pounds in the last month    Insomnia/sleeplessness: sleeping well   Fatigue/anergy: denies  Anhedonia/lack of interest: stable - started playing The Last of Us   Attention/concentration: denies   Psychomotor agitation/retardation: no  Somatic symptoms: no  Anxiety/panic attack: denies  Mylene/hypomania: no  Hopelessness/helplessness/worthlessness: no  Self-injurious behavior/high-risk behavior: no  Suicidal ideation: no  Homicidal ideation: no  Auditory hallucinations: denies  Visual hallucinations: no  Other perceptual disturbances: no  Delusional thinking: no  Obsessive/compulsive symptoms: no    Review Of Systems:      Constitutional negative   ENT negative   Cardiovascular negative   Respiratory negative   Gastrointestinal negative   Genitourinary negative   Musculoskeletal negative   Integumentary negative   Neurological negative   Endocrine negative   Other Symptoms none, all other systems are negative     Objective    OBJECTIVE:     Visit Vitals  Smoking Status Never      Wt Readings from Last 6 Encounters:   No data found for Wt        History reviewed. No pertinent past medical history.   History reviewed. No pertinent surgical history.    Meds/Allergies    Allergies   Allergen Reactions    Gantrisin [Sulfisoxazole] Hallucinations     Took as a child and  had hallucinations per mother     Current Outpatient Medications   Medication Instructions    b complex vitamins capsule 1 capsule, Oral, Daily    cholecalciferol (VITAMIN D3) 400 Units, Oral, Every morning    clozapine (CLOZARIL) 75 mg, Oral, Daily at bedtime    melatonin 1 mg, Oral, Daily at bedtime PRN    risperiDONE (RISPERDAL) 2 mg, Oral, Daily at bedtime    senna (SENOKOT) 8.6 mg, Oral, Daily at bedtime           Mental Status Exam:    Appearance age appropriate, casually dressed, seated comfortably, limited eye contact   Behavior cooperative, calm   Speech normal rate, normal volume, normal pitch   Mood euthymic   Affect blunted   Thought Processes organized, goal directed   Associations concrete associations   Thought Content no overt delusions   Perceptual Disturbances: Denies auditory or visual hallucinations and Does not appear to be responding to internal stimuli   Abnormal Thoughts  Risk Potential Denies suicidal or homicidal ideation, plan, or intent   Orientation oriented to person, place, time/date, and situation   Memory recent and remote memory grossly intact   Consciousness alert and awake   Attention Span Concentration Span attention span and concentration are age appropriate   Intellect appears to be of average intelligence   Insight fair   Judgement fair   Muscle Strength and  Gait normal muscle strength and normal muscle tone, normal gait and normal balance   Motor Activity no abnormal movements   Language no difficulty naming common objects, no difficulty repeating a phrase, no difficulty writing a sentence   Fund of Knowledge adequate knowledge of current events  adequate fund of knowledge regarding past history  adequate fund of knowledge regarding vocabulary      Laboratory Results: I have personally reviewed all pertinent laboratory/tests results    Orders Only on 04/22/2024   Component Date Value Ref Range Status    White Blood Cell Count 04/22/2024 6.3  3.4 - 10.8 x10E3/uL Final    Red  Blood Cell Count 04/22/2024 4.94  4.14 - 5.80 x10E6/uL Final    Hemoglobin 04/22/2024 14.5  13.0 - 17.7 g/dL Final    HCT 04/22/2024 43.5  37.5 - 51.0 % Final    MCV 04/22/2024 88  79 - 97 fL Final    MCH 04/22/2024 29.4  26.6 - 33.0 pg Final    MCHC 04/22/2024 33.3  31.5 - 35.7 g/dL Final    RDW 04/22/2024 12.6  11.6 - 15.4 % Final    Platelet Count 04/22/2024 291  150 - 450 x10E3/uL Final    Neutrophils 04/22/2024 64  Not Estab. % Final    Lymphocytes 04/22/2024 26  Not Estab. % Final    Monocytes 04/22/2024 9  Not Estab. % Final    Eosinophils 04/22/2024 0  Not Estab. % Final    Basophils PCT 04/22/2024 1  Not Estab. % Final    Neutrophils (Absolute) 04/22/2024 4.0  1.4 - 7.0 x10E3/uL Final    Lymphocytes (Absolute) 04/22/2024 1.7  0.7 - 3.1 x10E3/uL Final    Monocytes (Absolute) 04/22/2024 0.6  0.1 - 0.9 x10E3/uL Final    Eosinophils (Absolute) 04/22/2024 0.0  0.0 - 0.4 x10E3/uL Final    Basophils ABS 04/22/2024 0.1  0.0 - 0.2 x10E3/uL Final    Immature Granulocytes 04/22/2024 0  Not Estab. % Final    Immature Granulocytes (Absolute) 04/22/2024 0.0  0.0 - 0.1 x10E3/uL Final   Orders Only on 04/15/2024   Component Date Value Ref Range Status    White Blood Cell Count 04/15/2024 7.6  3.4 - 10.8 x10E3/uL Final    Red Blood Cell Count 04/15/2024 4.99  4.14 - 5.80 x10E6/uL Final    Hemoglobin 04/15/2024 14.5  13.0 - 17.7 g/dL Final    HCT 04/15/2024 43.3  37.5 - 51.0 % Final    MCV 04/15/2024 87  79 - 97 fL Final    MCH 04/15/2024 29.1  26.6 - 33.0 pg Final    MCHC 04/15/2024 33.5  31.5 - 35.7 g/dL Final    RDW 04/15/2024 12.6  11.6 - 15.4 % Final    Platelet Count 04/15/2024 284  150 - 450 x10E3/uL Final    Neutrophils 04/15/2024 60  Not Estab. % Final    Lymphocytes 04/15/2024 29  Not Estab. % Final    Monocytes 04/15/2024 9  Not Estab. % Final    Eosinophils 04/15/2024 1  Not Estab. % Final    Basophils PCT 04/15/2024 1  Not Estab. % Final    Neutrophils (Absolute) 04/15/2024 4.6  1.4 - 7.0 x10E3/uL Final     Lymphocytes (Absolute) 04/15/2024 2.2  0.7 - 3.1 x10E3/uL Final    Monocytes (Absolute) 04/15/2024 0.7  0.1 - 0.9 x10E3/uL Final    Eosinophils (Absolute) 04/15/2024 0.1  0.0 - 0.4 x10E3/uL Final    Basophils ABS 04/15/2024 0.1  0.0 - 0.2 x10E3/uL Final    Immature Granulocytes 04/15/2024 0  Not Estab. % Final    Immature Granulocytes (Absolute) 04/15/2024 0.0  0.0 - 0.1 x10E3/uL Final   Ancillary Orders on 04/12/2024   Component Date Value Ref Range Status    White Blood Cell Count 04/29/2024 6.3  3.4 - 10.8 x10E3/uL Final    Red Blood Cell Count 04/29/2024 4.99  4.14 - 5.80 x10E6/uL Final    Hemoglobin 04/29/2024 14.8  13.0 - 17.7 g/dL Final    HCT 04/29/2024 42.6  37.5 - 51.0 % Final    MCV 04/29/2024 85  79 - 97 fL Final    MCH 04/29/2024 29.7  26.6 - 33.0 pg Final    MCHC 04/29/2024 34.7  31.5 - 35.7 g/dL Final    RDW 04/29/2024 12.7  11.6 - 15.4 % Final    Platelet Count 04/29/2024 297  150 - 450 x10E3/uL Final    Neutrophils 04/29/2024 62  Not Estab. % Final    Lymphocytes 04/29/2024 27  Not Estab. % Final    Monocytes 04/29/2024 10  Not Estab. % Final    Eosinophils 04/29/2024 0  Not Estab. % Final    Basophils PCT 04/29/2024 1  Not Estab. % Final    Neutrophils (Absolute) 04/29/2024 3.8  1.4 - 7.0 x10E3/uL Final    Lymphocytes (Absolute) 04/29/2024 1.7  0.7 - 3.1 x10E3/uL Final    Monocytes (Absolute) 04/29/2024 0.6  0.1 - 0.9 x10E3/uL Final    Eosinophils (Absolute) 04/29/2024 0.0  0.0 - 0.4 x10E3/uL Final    Basophils ABS 04/29/2024 0.1  0.0 - 0.2 x10E3/uL Final    Immature Granulocytes 04/29/2024 0  Not Estab. % Final    Immature Granulocytes (Absolute) 04/29/2024 0.0  0.0 - 0.1 x10E3/uL Final   Orders Only on 04/08/2024   Component Date Value Ref Range Status    White Blood Cell Count 04/08/2024 6.9  3.4 - 10.8 x10E3/uL Final    Red Blood Cell Count 04/08/2024 5.06  4.14 - 5.80 x10E6/uL Final    Hemoglobin 04/08/2024 14.8  13.0 - 17.7 g/dL Final    HCT 04/08/2024 42.4  37.5 - 51.0 % Final    MCV  04/08/2024 84  79 - 97 fL Final    MCH 04/08/2024 29.2  26.6 - 33.0 pg Final    MCHC 04/08/2024 34.9  31.5 - 35.7 g/dL Final    RDW 04/08/2024 12.6  11.6 - 15.4 % Final    Platelet Count 04/08/2024 283  150 - 450 x10E3/uL Final    Neutrophils 04/08/2024 62  Not Estab. % Final    Lymphocytes 04/08/2024 26  Not Estab. % Final    Monocytes 04/08/2024 10  Not Estab. % Final    Eosinophils 04/08/2024 1  Not Estab. % Final    Basophils PCT 04/08/2024 1  Not Estab. % Final    Neutrophils (Absolute) 04/08/2024 4.3  1.4 - 7.0 x10E3/uL Final    Lymphocytes (Absolute) 04/08/2024 1.8  0.7 - 3.1 x10E3/uL Final    Monocytes (Absolute) 04/08/2024 0.7  0.1 - 0.9 x10E3/uL Final    Eosinophils (Absolute) 04/08/2024 0.0  0.0 - 0.4 x10E3/uL Final    Basophils ABS 04/08/2024 0.1  0.0 - 0.2 x10E3/uL Final    Immature Granulocytes 04/08/2024 0  Not Estab. % Final    Immature Granulocytes (Absolute) 04/08/2024 0.0  0.0 - 0.1 x10E3/uL Final   Ancillary Orders on 03/29/2024   Component Date Value Ref Range Status    White Blood Cell Count 04/01/2024 7.1  3.4 - 10.8 x10E3/uL Final    Red Blood Cell Count 04/01/2024 4.96  4.14 - 5.80 x10E6/uL Final    Hemoglobin 04/01/2024 14.3  13.0 - 17.7 g/dL Final    HCT 04/01/2024 42.6  37.5 - 51.0 % Final    MCV 04/01/2024 86  79 - 97 fL Final    MCH 04/01/2024 28.8  26.6 - 33.0 pg Final    MCHC 04/01/2024 33.6  31.5 - 35.7 g/dL Final    RDW 04/01/2024 12.8  11.6 - 15.4 % Final    Platelet Count 04/01/2024 292  150 - 450 x10E3/uL Final    Neutrophils 04/01/2024 59  Not Estab. % Final    Lymphocytes 04/01/2024 29  Not Estab. % Final    Monocytes 04/01/2024 10  Not Estab. % Final    Eosinophils 04/01/2024 1  Not Estab. % Final    Basophils PCT 04/01/2024 1  Not Estab. % Final    Neutrophils (Absolute) 04/01/2024 4.2  1.4 - 7.0 x10E3/uL Final    Lymphocytes (Absolute) 04/01/2024 2.1  0.7 - 3.1 x10E3/uL Final    Monocytes (Absolute) 04/01/2024 0.7  0.1 - 0.9 x10E3/uL Final    Eosinophils (Absolute)  04/01/2024 0.1  0.0 - 0.4 x10E3/uL Final    Basophils ABS 04/01/2024 0.1  0.0 - 0.2 x10E3/uL Final    Immature Granulocytes 04/01/2024 0  Not Estab. % Final    Immature Granulocytes (Absolute) 04/01/2024 0.0  0.0 - 0.1 x10E3/uL Final   Orders Only on 03/25/2024   Component Date Value Ref Range Status    White Blood Cell Count 03/25/2024 6.9  3.4 - 10.8 x10E3/uL Final    Red Blood Cell Count 03/25/2024 5.06  4.14 - 5.80 x10E6/uL Final    Hemoglobin 03/25/2024 14.4  13.0 - 17.7 g/dL Final    HCT 03/25/2024 43.7  37.5 - 51.0 % Final    MCV 03/25/2024 86  79 - 97 fL Final    MCH 03/25/2024 28.5  26.6 - 33.0 pg Final    MCHC 03/25/2024 33.0  31.5 - 35.7 g/dL Final    RDW 03/25/2024 12.3  11.6 - 15.4 % Final    Platelet Count 03/25/2024 284  150 - 450 x10E3/uL Final    Neutrophils 03/25/2024 60  Not Estab. % Final    Lymphocytes 03/25/2024 28  Not Estab. % Final    Monocytes 03/25/2024 10  Not Estab. % Final    Eosinophils 03/25/2024 1  Not Estab. % Final    Basophils PCT 03/25/2024 1  Not Estab. % Final    Neutrophils (Absolute) 03/25/2024 4.2  1.4 - 7.0 x10E3/uL Final    Lymphocytes (Absolute) 03/25/2024 2.0  0.7 - 3.1 x10E3/uL Final    Monocytes (Absolute) 03/25/2024 0.7  0.1 - 0.9 x10E3/uL Final    Eosinophils (Absolute) 03/25/2024 0.1  0.0 - 0.4 x10E3/uL Final    Basophils ABS 03/25/2024 0.1  0.0 - 0.2 x10E3/uL Final    Immature Granulocytes 03/25/2024 0  Not Estab. % Final    Immature Granulocytes (Absolute) 03/25/2024 0.0  0.0 - 0.1 x10E3/uL Final   Orders Only on 03/18/2024   Component Date Value Ref Range Status    White Blood Cell Count 03/18/2024 7.1  3.4 - 10.8 x10E3/uL Final    Red Blood Cell Count 03/18/2024 4.85  4.14 - 5.80 x10E6/uL Final    Hemoglobin 03/18/2024 13.9  13.0 - 17.7 g/dL Final    HCT 03/18/2024 41.3  37.5 - 51.0 % Final    MCV 03/18/2024 85  79 - 97 fL Final    MCH 03/18/2024 28.7  26.6 - 33.0 pg Final    MCHC 03/18/2024 33.7  31.5 - 35.7 g/dL Final    RDW 03/18/2024 12.4  11.6 - 15.4 %  Final    Platelet Count 03/18/2024 308  150 - 450 x10E3/uL Final    Neutrophils 03/18/2024 63  Not Estab. % Final    Lymphocytes 03/18/2024 25  Not Estab. % Final    Monocytes 03/18/2024 9  Not Estab. % Final    Eosinophils 03/18/2024 2  Not Estab. % Final    Basophils PCT 03/18/2024 1  Not Estab. % Final    Neutrophils (Absolute) 03/18/2024 4.4  1.4 - 7.0 x10E3/uL Final    Lymphocytes (Absolute) 03/18/2024 1.8  0.7 - 3.1 x10E3/uL Final    Monocytes (Absolute) 03/18/2024 0.7  0.1 - 0.9 x10E3/uL Final    Eosinophils (Absolute) 03/18/2024 0.1  0.0 - 0.4 x10E3/uL Final    Basophils ABS 03/18/2024 0.1  0.0 - 0.2 x10E3/uL Final    Immature Granulocytes 03/18/2024 0  Not Estab. % Final    Immature Granulocytes (Absolute) 03/18/2024 0.0  0.0 - 0.1 x10E3/uL Final   Ancillary Orders on 03/08/2024   Component Date Value Ref Range Status    White Blood Cell Count 03/11/2024 8.1  3.4 - 10.8 x10E3/uL Final    Red Blood Cell Count 03/11/2024 4.95  4.14 - 5.80 x10E6/uL Final    Hemoglobin 03/11/2024 14.4  13.0 - 17.7 g/dL Final    HCT 03/11/2024 42.4  37.5 - 51.0 % Final    MCV 03/11/2024 86  79 - 97 fL Final    MCH 03/11/2024 29.1  26.6 - 33.0 pg Final    MCHC 03/11/2024 34.0  31.5 - 35.7 g/dL Final    RDW 03/11/2024 12.9  11.6 - 15.4 % Final    Platelet Count 03/11/2024 287  150 - 450 x10E3/uL Final    Neutrophils 03/11/2024 64  Not Estab. % Final    Lymphocytes 03/11/2024 23  Not Estab. % Final    Monocytes 03/11/2024 9  Not Estab. % Final    Eosinophils 03/11/2024 3  Not Estab. % Final    Basophils PCT 03/11/2024 1  Not Estab. % Final    Neutrophils (Absolute) 03/11/2024 5.2  1.4 - 7.0 x10E3/uL Final    Lymphocytes (Absolute) 03/11/2024 1.9  0.7 - 3.1 x10E3/uL Final    Monocytes (Absolute) 03/11/2024 0.7  0.1 - 0.9 x10E3/uL Final    Eosinophils (Absolute) 03/11/2024 0.2  0.0 - 0.4 x10E3/uL Final    Basophils ABS 03/11/2024 0.1  0.0 - 0.2 x10E3/uL Final    Immature Granulocytes 03/11/2024 0  Not Estab. % Final    Immature  Granulocytes (Absolute) 03/11/2024 0.0  0.0 - 0.1 x10E3/uL Final   Orders Only on 03/04/2024   Component Date Value Ref Range Status    White Blood Cell Count 03/04/2024 6.8  3.4 - 10.8 x10E3/uL Final    Red Blood Cell Count 03/04/2024 4.80  4.14 - 5.80 x10E6/uL Final    Hemoglobin 03/04/2024 14.2  13.0 - 17.7 g/dL Final    HCT 03/04/2024 41.8  37.5 - 51.0 % Final    MCV 03/04/2024 87  79 - 97 fL Final    MCH 03/04/2024 29.6  26.6 - 33.0 pg Final    MCHC 03/04/2024 34.0  31.5 - 35.7 g/dL Final    RDW 03/04/2024 12.8  11.6 - 15.4 % Final    Platelet Count 03/04/2024 311  150 - 450 x10E3/uL Final    Neutrophils 03/04/2024 60  Not Estab. % Final    Lymphocytes 03/04/2024 25  Not Estab. % Final    Monocytes 03/04/2024 10  Not Estab. % Final    Eosinophils 03/04/2024 4  Not Estab. % Final    Basophils PCT 03/04/2024 1  Not Estab. % Final    Neutrophils (Absolute) 03/04/2024 4.1  1.4 - 7.0 x10E3/uL Final    Lymphocytes (Absolute) 03/04/2024 1.7  0.7 - 3.1 x10E3/uL Final    Monocytes (Absolute) 03/04/2024 0.7  0.1 - 0.9 x10E3/uL Final    Eosinophils (Absolute) 03/04/2024 0.2  0.0 - 0.4 x10E3/uL Final    Basophils ABS 03/04/2024 0.1  0.0 - 0.2 x10E3/uL Final    Immature Granulocytes 03/04/2024 0  Not Estab. % Final    Immature Granulocytes (Absolute) 03/04/2024 0.0  0.0 - 0.1 x10E3/uL Final   Orders Only on 02/27/2024   Component Date Value Ref Range Status    White Blood Cell Count 02/27/2024 8.9  3.4 - 10.8 x10E3/uL Final    Red Blood Cell Count 02/27/2024 4.61  4.14 - 5.80 x10E6/uL Final    Hemoglobin 02/27/2024 13.6  13.0 - 17.7 g/dL Final    HCT 02/27/2024 39.0  37.5 - 51.0 % Final    MCV 02/27/2024 85  79 - 97 fL Final    MCH 02/27/2024 29.5  26.6 - 33.0 pg Final    MCHC 02/27/2024 34.9  31.5 - 35.7 g/dL Final    RDW 02/27/2024 12.8  11.6 - 15.4 % Final    Platelet Count 02/27/2024 304  150 - 450 x10E3/uL Final    Neutrophils 02/27/2024 67  Not Estab. % Final    Lymphocytes 02/27/2024 19  Not Estab. % Final     "Monocytes 02/27/2024 10  Not Estab. % Final    Eosinophils 02/27/2024 3  Not Estab. % Final    Basophils PCT 02/27/2024 1  Not Estab. % Final    Neutrophils (Absolute) 02/27/2024 6.0  1.4 - 7.0 x10E3/uL Final    Lymphocytes (Absolute) 02/27/2024 1.7  0.7 - 3.1 x10E3/uL Final    Monocytes (Absolute) 02/27/2024 0.9  0.1 - 0.9 x10E3/uL Final    Eosinophils (Absolute) 02/27/2024 0.3  0.0 - 0.4 x10E3/uL Final    Basophils ABS 02/27/2024 0.1  0.0 - 0.2 x10E3/uL Final    Immature Granulocytes 02/27/2024 0  Not Estab. % Final    Immature Granulocytes (Absolute) 02/27/2024 0.0  0.0 - 0.1 x10E3/uL Final   There may be more visits with results that are not included.             ___________________________________    History Review: The following portions of the patient's history were reviewed and updated as appropriate: allergies, current medications, past family history, past medical history, past social history, past surgical history, and problem list.    Unchanged information from this writer's previous assessment is copied and italicized; information that has changed is bolded.       Past Psychiatric History:      Past psychiatric diagnoses:   Schizophrenia, schizoaffective d/o, anxiety, depression   Inpatient psychiatric admissions:   10 times total (4 were involuntary)  First - age 22 after stealing sisters klonopin  Most common reason: first few were \"no reason\"  2017 - got invega shot was a very influential moment  2 suicide attempts/SI: SI and wanting to jump off a roof (2021) a few months later after sleeping pills, tried to hang himself 2 years ago  Psychosis 3 times (most recent for that reason was 3 years)  Most recent was 2 years ago  Has stayed out of the hospital due to \"taking his medications, not having suicidal ideation\"  Denies history of ECT, denies having had ICM in past.   Prior outpatient psychiatric treatment:   Saw Chilo Ramsey for 3 years  Saw psychiatrist at [facility] for a year before " "that  Past/current psychotherapy:   Worked with a therapist at [facility], stopped seeing 1-2 years ago because he left the practice  History of suicidal attempts/gestures:   2: took sleeping pills (August 2021), tried to hang self two years  History of non-suicidal self-injurious behavior:   None  History of violence/aggressive behaviors:   None  Psychotropic medication trials:   Invega, (discomfort) risperdal, zyprexa, thorazine (all unknown reasons for discontinuation)  Abilify (bad reaction)  Invega SCHAEFER x1 in 2019 (\"hurt his head\")  Ativan ('caused psychosis')  Loxapine - feeling too tired on dosages above 20mg  Risperdal - above 2mg causes nausea and \"hurting\" in his head  Clozapine - current medication  Melatonin - current medication  Substance abuse inpatient/outpatient rehabilitation:   Rehab in 2017 - marijuana use  Eating disorder history:   No  Other services: used to have a  but reports he does not anymore because they ran out of things to do together      Substance Abuse History:     States he was smoking a lot of marijuana for 5 years, nearly every day, stopped smoking 2019 July after he \"freaked out\" and thought \"there were people in his basement and living in his house and torturing him. \"     Denies any other recreational drug use and otherwise denies history of alcohol, illict substance, or tobacco abuse., Patient denies previous legal actions or arrests related to substance intoxication including prior DWIs/DUIs., Patient does not exhibit objective evidence of substance withdrawal during today's examination nor do they appear under the influence of any psychoactive substance.       Family Psychiatric History:      Family History   History reviewed. No pertinent family history.         Psychiatric Family History: Per patient he is adopted,  Per mother is not adopted. Maternal grandfather - schizophrenia  Tex's half sister has bipolar disorder     Social History:     Developmental: " "Was in special education, denies IEP  Per care everywhere, mild developmental delay, early intervention at age 2.5. Concern for autism, diagnosed with ADHD.  Has 2 sister (50 and 34 years old), states he was adopted but his mother reports he is not adopted  Education: high school diploma/GED  Marital history: single  Children: none  Living arrangement, social support: Family is supportive, mom is supportive. Lives with mother, on a waiting list for housing, on disability for schizoaffective disorder.   Dad lives in [place]   Occupational History: Permanent disability. 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 , Patient denies history of arrests or violence with a deadly weapon.    history: None     Traumatic History:      Abuse: Denies  Other Traumatic Events: Bullying, and would not disclose further.  ___________________________________      Visit Time    Visit Start Time: 3:00  Visit Stop Time: 3:20  Total Visit Duration:  20 minutes    Lauren Stockton MD   05/29/24    "

## 2024-05-29 ENCOUNTER — OFFICE VISIT (OUTPATIENT)
Dept: PSYCHIATRY | Facility: CLINIC | Age: 29
End: 2024-05-29
Payer: COMMERCIAL

## 2024-05-29 DIAGNOSIS — F20.9 SCHIZOPHRENIA, UNSPECIFIED TYPE (HCC): Primary | ICD-10-CM

## 2024-05-29 PROCEDURE — 99214 OFFICE O/P EST MOD 30 MIN: CPT | Performed by: STUDENT IN AN ORGANIZED HEALTH CARE EDUCATION/TRAINING PROGRAM

## 2024-05-29 PROCEDURE — G2211 COMPLEX E/M VISIT ADD ON: HCPCS | Performed by: STUDENT IN AN ORGANIZED HEALTH CARE EDUCATION/TRAINING PROGRAM

## 2024-05-29 NOTE — PATIENT INSTRUCTIONS
Please call the office nursing staff for medication issues including refills, problems getting medications, bothersome side effects, etc at [phone].     Please return for a follow up appointment as discussed and arrive approximately 15 minutes prior to your appointment time. If you are running late or are unable to attend your appointment, please call our office at [phone]    If you have thoughts of harming yourself or are otherwise in psychological crisis, do not hesitate to contact your County Crisis hotline, or 911 or go to the nearest emergency room.  [facility] Crisis: [phone]  [facility] Crisis: [phone]  [facility] Crisis: [phone]  [facility] Crisis: [phone]  [facility] Crisis: [phone]  [facility] Crisis: [phone]  [facility] Crisis: [phone]  National Suicide Prevention Hotline: 1-333.487.6985 or call 244

## 2024-05-30 ENCOUNTER — OFFICE VISIT (OUTPATIENT)
Dept: BEHAVIORAL/MENTAL HEALTH CLINIC | Facility: CLINIC | Age: 29
End: 2024-05-30
Payer: COMMERCIAL

## 2024-05-30 DIAGNOSIS — Z13.228 SCREENING FOR ENDOCRINE, NUTRITIONAL, METABOLIC AND IMMUNITY DISORDER: ICD-10-CM

## 2024-05-30 DIAGNOSIS — F12.11 CANNABIS ABUSE, IN REMISSION: ICD-10-CM

## 2024-05-30 DIAGNOSIS — Z13.0 SCREENING FOR ENDOCRINE, NUTRITIONAL, METABOLIC AND IMMUNITY DISORDER: ICD-10-CM

## 2024-05-30 DIAGNOSIS — Z13.29 SCREENING FOR ENDOCRINE, NUTRITIONAL, METABOLIC AND IMMUNITY DISORDER: ICD-10-CM

## 2024-05-30 DIAGNOSIS — F25.0 SCHIZOAFFECTIVE DISORDER, BIPOLAR TYPE (HCC): Primary | ICD-10-CM

## 2024-05-30 DIAGNOSIS — Z79.899 LONG TERM CURRENT USE OF ANTIPSYCHOTIC MEDICATION: Primary | ICD-10-CM

## 2024-05-30 DIAGNOSIS — Z13.21 SCREENING FOR ENDOCRINE, NUTRITIONAL, METABOLIC AND IMMUNITY DISORDER: ICD-10-CM

## 2024-05-30 PROCEDURE — 90837 PSYTX W PT 60 MINUTES: CPT | Performed by: COUNSELOR

## 2024-05-31 NOTE — PSYCH
"Behavioral Health Psychotherapy Progress Note    Psychotherapy Provided: Individual Psychotherapy     1. Schizoaffective disorder, bipolar type (HCC)        2. Cannabis abuse, in remission            Goals addressed in session: Goal 1     DATA: Clt is a transition of care from Horace. His thoughts were tangential and he reported that he has abused cannabis and prescription drugs in the past but has been sober. Sudhakar has insight into drugs causing psychotic symptoms. Clinician is building rapport with sudhakar. He enjoys going for walks.   During this session, this clinician used the following therapeutic modalities: Client-centered Therapy    Substance Abuse was addressed during this session. If the client is diagnosed with a co-occurring substance use disorder, please indicate any changes in the frequency or amount of use: none. Stage of change for addressing substance use diagnoses: Maintenance    ASSESSMENT:  Tex Hill presents with a Depressed mood.     his affect is Normal range and intensity, which is congruent, with his mood and the content of the session. The client has not made progress on their goals.     Tex Hill presents with a minimal risk of suicide, minimal risk of self-harm, and minimal risk of harm to others.    For any risk assessment that surpasses a \"low\" rating, a safety plan must be developed.    A safety plan was indicated: no  If yes, describe in detail N/A    PLAN: Between sessions, Tex Hill will use supports and coping skills. At the next session, the therapist will use Client-centered Therapy to address stress and cravings.    Behavioral Health Treatment Plan and Discharge Planning: Tex Hill is aware of and agrees to continue to work on their treatment plan. They have identified and are working toward their discharge goals. yes    Visit start and stop times:    05/31/24  Start Time: 1110  Stop Time: 1205  Total Visit Time: 55 minutes  "

## 2024-06-11 LAB
CHOLEST SERPL-MCNC: 203 MG/DL (ref 100–199)
EST. AVERAGE GLUCOSE BLD GHB EST-MCNC: 111 MG/DL
HBA1C MFR BLD: 5.5 % (ref 4.8–5.6)
HDLC SERPL-MCNC: 33 MG/DL
LDL CALC COMMENT: ABNORMAL
LDLC SERPL CALC-MCNC: 131 MG/DL (ref 0–99)
LDLC/HDLC SERPL: 4 RATIO (ref 0–3.6)
SL AMB VLDL CHOLESTEROL CALC: 39 MG/DL (ref 5–40)
TRIGL SERPL-MCNC: 215 MG/DL (ref 0–149)

## 2024-06-13 ENCOUNTER — SOCIAL WORK (OUTPATIENT)
Dept: BEHAVIORAL/MENTAL HEALTH CLINIC | Facility: CLINIC | Age: 29
End: 2024-06-13
Payer: COMMERCIAL

## 2024-06-13 DIAGNOSIS — F25.0 SCHIZOAFFECTIVE DISORDER, BIPOLAR TYPE (HCC): Primary | ICD-10-CM

## 2024-06-13 PROCEDURE — 90834 PSYTX W PT 45 MINUTES: CPT | Performed by: COUNSELOR

## 2024-06-13 NOTE — PSYCH
"Behavioral Health Psychotherapy Progress Note    Psychotherapy Provided: Individual Psychotherapy     1. Schizoaffective disorder, bipolar type (HCC)            Goals addressed in session: Goal 1     DATA: Clt and clinician completed treatment plan together. Clt identified that he understands that thisngs he believed in the past were just in his mind. He identified having a verbal argument with his neighbor as a trigger to his schizophrenic symptoms. Clt said his parents are supportive. Sudhakar has cravings to smoke cannabis but does not due to very negative consequences including paranoia, hallucinations and ending up in the hospital. He reported thoughts that his cousin is a famous rapper and that people are jealous of him and wanted to kill him. We discussed ways to feel safe and keep himself safe. Clinician is building rapport with clt and provided validation and support.   During this session, this clinician used the following therapeutic modalities: Client-centered Therapy    Substance Abuse was addressed during this session. If the client is diagnosed with a co-occurring substance use disorder, please indicate any changes in the frequency or amount of use: none. Stage of change for addressing substance use diagnoses: Maintenance    ASSESSMENT:  Tex Hill presents with a Dysthymic mood.     his affect is Normal range and intensity, which is congruent, with his mood and the content of the session. The client has made progress on their goals.     Tex Hill presents with a minimal risk of suicide, minimal risk of self-harm, and minimal risk of harm to others.    For any risk assessment that surpasses a \"low\" rating, a safety plan must be developed.    A safety plan was indicated: no  If yes, describe in detail N/A    PLAN: Between sessions, Tex Hill will use his supports and coping skills and become familiar with cognitive distortions. At the next session, the therapist will use Cognitive " Behavioral Therapy to address anxiety.    Behavioral Health Treatment Plan and Discharge Planning: Tex Hill is aware of and agrees to continue to work on their treatment plan. They have identified and are working toward their discharge goals. yes    Visit start and stop times:    06/13/24  Start Time: 1610  Stop Time: 1700

## 2024-06-13 NOTE — BH TREATMENT PLAN
"Outpatient Behavioral Health Psychotherapy Treatment Plan    Texnicolás Hill  1995     Date of Initial Psychotherapy Assessment: 05/02/24   Date of Current Treatment Plan: 06/13/24  Treatment Plan Target Date: 12/09/24  Treatment Plan Expiration Date: 12/09/24    Diagnosis:   1. Schizoaffective disorder, bipolar type (HCC)            Area(s) of Need: To not be upset over the past; Stress management    Long Term Goal 1 (in the client's own words): \"To feel better.\"    Stage of Change: Action    Target Date for completion: 12/09/24     Anticipated therapeutic modalities: CBT and DBT     People identified to complete this goal: Clt and therapist      Objective 1: (identify the means of measuring success in meeting the objective): Tex to report feeling less numb and go for walks for 30min-60min 3x per day.       Objective 2: (identify the means of measuring success in meeting the objective): Tex  to identify and use coping skills for stress and process trauma in sessions with support.       I am currently under the care of a [facility] psychiatric provider: yes    My [facility] psychiatric provider is: Lauren Stockton    I am currently taking psychiatric medications: Yes, as prescribed    I feel that I will be ready for discharge from mental health care when I reach the following (measurable goal/objective): \"I do not want to leave.\"    For children and adults who have a legal guardian:   Has there been any change to custody orders and/or guardianship status? NA. If yes, attach updated documentation.    I have created my Crisis Plan and have been offered a copy of this plan    Behavioral Health Treatment Plan [facility]: Diagnosis and Treatment Plan explained to Tex Hill acknowledges an understanding of their diagnosis. Tex Hill agrees to this treatment plan.    I have been offered a copy of this Treatment Plan. yes        "

## 2024-06-17 ENCOUNTER — TELEPHONE (OUTPATIENT)
Dept: PSYCHIATRY | Facility: CLINIC | Age: 29
End: 2024-06-17

## 2024-06-17 NOTE — TELEPHONE ENCOUNTER
Left voicemail informing patient and/or parent/guardian of the Psych Encounter form needing to be signed as a requirement from the insurance company for billing purposes. Patient can access form via [employer] and sign electronically.     Please make patient aware this form must be signed for each visit as a requirement to continue future visits with provider.

## 2024-06-25 ENCOUNTER — TELEPHONE (OUTPATIENT)
Dept: PSYCHIATRY | Facility: CLINIC | Age: 29
End: 2024-06-25

## 2024-06-25 DIAGNOSIS — F20.9 SCHIZOPHRENIA, UNSPECIFIED TYPE (HCC): ICD-10-CM

## 2024-06-25 NOTE — PSYCH
MEDICATION MANAGEMENT NOTE      [facility]: [facility]   Mental Health Outpatient Services   [address] [place] [id number]   [phone]    Name and Date of Birth:  Tex Hill 29 y.o. 1995 MRN: [id number]    Date of Visit: June 26, 2024    Reason for Visit: Follow-up visit regarding medication management     _____________________________    Assessment & Plan   Tex Hill is a 29 y.o. male, Single,  domiciled with mother, on permanent disability, with past medical history of Vitamin D deficiency, prior psychiatric diagnoses of schizoaffective disorder; multiple hospitalizations and two suicide attempts (most recently 2021).  Tex was personally seen and evaluated today at the [facility] outpatient clinic for follow-up regarding medication management.       On assessment, Tex Hill reports things have been going well overall in terms of his mood, and anxiety levels.  He denies any recent auditory hallucinations, denies thoughts to harm himself or others.  He has been trying to be more active by taking walks, has been interested in playing videogames, playing multiplayer video games.  He reports adequate sleep, appetite, energy levels, though does report sometimes struggling with lower energy.  He has been adherent with his medication, still is resistant to the idea of switching over from dual antipsychotic therapy to monotherapy, and is resistant to further increases of clozapine.  However, he continues to do well and is reacting appropriately to medication.  He denies any side effects with the exception of weight gain and high cholesterol shown on labs this month.  We discussed that it would be beneficial for him to meet with a nutritionist to discuss dietary changes to address high cholesterol and triglycerides, as well as start fish oil supplementation which can be beneficial for the above, as well as brain health.  Also  encouraged him to discuss with his primary care provider who may also want to start him on medication to address this side effect.  We discussed that with these sorts of side effects we have to have a risk versus benefit analysis; Tex feels strongly that the medication benefit is worth the risk of these side effects, and vocalized this today. Therefore we will continue at the current dosages. He is in agreement with the treatment plan as detailed below, and agrees to call the office with any concerns or side effects between appointments.     DSM-5 Diagnoses/Visit Diagnoses:     1. Schizophrenia, unspecified type (HCC)    2. Drug-induced constipation    3. Disrupted sleep-wake cycle    4. Weight gain due to medication    5. High cholesterol        Treatment Recommendations/Precautions:  Continue psychopharmacological management as follows:  Continue risperdal 2mg HS for psychosis  Clozapine 75mg HS, for psychosis  CBC with differential every other week per REMS program   Clozapine level = 70 (low) on 12/11 on 100mg dosage  AIMS = 0 on 5/1/24  Continue vitamin D supplementation for deficiency  Initiate fish oil supplementation for brain health and for high triglycerides  Continue daily sennakot for clozapine induced constipation  Melatonin 1mg HS PRN for sleep-wake cycle regulation  Labs most recently obtained June 2024, reviewed.   Discussed results of lipid panel and Ha1c today  Referral for nutrition services due to medication induced weight gain and hyperlipidemia  Continue individual psychotherapy, transitioning therapists   Referred for psychological/neuropsychological testing for autism evaluation  Referred for MRI and encouraged this to be completed for psychosis workup and head pain/pressure symptoms  Previously referred to case management for peer specialist, on waiting list  Follow up in 4 weeks for medication management  Follow up with PCP for medical issues and ongoing care  Aware of 24 hour and weekend  "coverage for urgent situations accessed by calling [facility] Mental Health Outpatient main practice number    Individual psychotherapy provided: No     Treatment Plan:     Completed and signed during the session: Not applicable - Treatment Plan not due at this session    Medications Risks/Benefits:      Risks, Benefits And Possible Side Effects Of Medications:    Risks, benefits, and possible side effects of medications explained to Tex and he verbalizes understanding and agreement for treatment.     Controlled Medication Discussion:     Not applicable    Medical Decision Making / Counseling / Coordination of Care:  The following interventions are recommended: return in 1 month for follow up.  Although patient's acute lethality risk is LOW, long-term/chronic lethality risk is mildly elevated given the risk factors listed above. However, at the current moment, Tex is future-oriented, forward-thinking, and demonstrates ability to act in a self-preserving manner as evidenced by volitionally seeking psychiatric evaluation and treatment today. To mitigate future risk, patient should adhere to treatment recommendations, avoid alcohol/illicit substance use, utilize community-based resources and familiar support, and prioritize mental health treatment. The diagnosis and treatment plan were reviewed with the patient. Risks, benefits, and alternatives to treatment were discussed. The importance of medication and treatment compliance was reviewed with the patient.     _____________________________________________    History of Present Illness     Chief Complaint: \"I'm good\"    SUBJECTIVE:    Tex Hill is a 29 y.o. male, possessing a past psychiatric history significant for schizophrenia, who was personally seen and evaluated at the [facility] outpatient clinic for follow-up regarding medication management.  At their last visit, the plan was to continue current medicaiton.    Tex " states that since their previous psychiatric appointment with this writer, he reports things are going well overall. Planning to go to the pool this summer. Mood has been good, no anxiety. He denies any recent auditory hallucinations and denies talking to himself. He has been pacing at home, has been trying to take walks but it is too hot out. He is adherent with medication and denies side effects including constipation, chest pain, fever, headache, fatigue, abnormal movements, stiffness, tremor. He reports appropriate sleep and appetite. He noticed a slight weight decrease recently. He denies thoughts to harm himself or others.    Presently, patient denies suicidal/homicidal ideation in addition to thoughts of self-injury.  At conclusion of evaluation, patient is amenable to the recommendations of this writer including: continue psychotropic medications as prescribed, continue therapy.  Also, patient is amenable to calling/contacting the outpatient office including this writer if any acute adverse effects of their medication regimen arise in addition to any comments or concerns pertaining to their psychiatric management.  Patient is amenable to calling/contacting crisis and/or attending to the nearest emergency department if their clinical condition deteriorates to assure their safety and stability, stating that they are able to appropriately confide in their mother regarding their psychiatric state.    Current Rating Scores:     None completed today.    Psychiatric Review Of Systems:  Unchanged information from this writer's previous assessment is copied and italicized; information that has changed is bolded.    Appetite: decreased recently  Adverse eating: no  Weight changes: reports slight weight loss  Insomnia/sleeplessness: sleeping well   Fatigue/anergy: denies  Anhedonia/lack of interest: stable - started playing The Last of Us, and [place] legends  Attention/concentration: denies   Psychomotor  agitation/retardation: no  Somatic symptoms: no  Anxiety/panic attack: denies  Mylene/hypomania: no  Hopelessness/helplessness/worthlessness: no  Self-injurious behavior/high-risk behavior: no  Suicidal ideation: no  Homicidal ideation: no  Auditory hallucinations: denies  Visual hallucinations: no  Other perceptual disturbances: no  Delusional thinking: no  Obsessive/compulsive symptoms: no    Review Of Systems:      Constitutional negative   ENT negative   Cardiovascular negative   Respiratory negative   Gastrointestinal negative   Genitourinary negative   Musculoskeletal negative   Integumentary negative   Neurological negative   Endocrine negative   Other Symptoms none, all other systems are negative     Objective    OBJECTIVE:     Visit Vitals  Smoking Status Never      Wt Readings from Last 6 Encounters:   No data found for Wt        History reviewed. No pertinent past medical history.   History reviewed. No pertinent surgical history.    Meds/Allergies    Allergies   Allergen Reactions    Gantrisin [Sulfisoxazole] Hallucinations     Took as a child and had hallucinations per mother     Current Outpatient Medications   Medication Instructions    b complex vitamins capsule 1 capsule, Oral, Daily    cholecalciferol (VITAMIN D3) 400 Units, Oral, Every morning    clozapine (CLOZARIL) 75 mg, Oral, Daily at bedtime    fish oil 1,000 mg, Oral, Daily    melatonin 1 mg, Oral, Daily at bedtime PRN    risperiDONE (RISPERDAL) 2 mg, Oral, Daily at bedtime    senna (SENOKOT) 8.6 mg, Oral, Daily at bedtime           Mental Status Exam:    Appearance age appropriate, casually dressed, seated comfortably, averted gaze   Behavior cooperative, calm   Speech normal rate, normal volume, normal pitch   Mood euthymic   Affect blunted   Thought Processes organized, goal directed   Associations intact associations   Thought Content no overt delusions   Perceptual Disturbances: Denies auditory or visual hallucinations and Does not  appear to be responding to internal stimuli   Abnormal Thoughts  Risk Potential Denies suicidal or homicidal ideation, plan, or intent   Orientation oriented to person, place, time/date, and situation   Memory recent and remote memory grossly intact   Consciousness alert and awake   Attention Span Concentration Span attention span and concentration are age appropriate   Intellect appears to be of average intelligence   Insight fair   Judgement fair   Muscle Strength and  Gait normal muscle strength and normal muscle tone, normal gait and normal balance   Motor Activity no abnormal movements   Language no difficulty naming common objects, no difficulty repeating a phrase, no difficulty writing a sentence   Fund of Knowledge adequate knowledge of current events  adequate fund of knowledge regarding past history  adequate fund of knowledge regarding vocabulary      Laboratory Results: I have personally reviewed all pertinent laboratory/tests results    Ancillary Orders on 06/21/2024   Component Date Value Ref Range Status    White Blood Cell Count 06/24/2024 6.5  3.4 - 10.8 x10E3/uL Final    Red Blood Cell Count 06/24/2024 4.84  4.14 - 5.80 x10E6/uL Final    Hemoglobin 06/24/2024 14.0  13.0 - 17.7 g/dL Final    HCT 06/24/2024 40.9  37.5 - 51.0 % Final    MCV 06/24/2024 85  79 - 97 fL Final    MCH 06/24/2024 28.9  26.6 - 33.0 pg Final    MCHC 06/24/2024 34.2  31.5 - 35.7 g/dL Final    RDW 06/24/2024 13.1  11.6 - 15.4 % Final    Platelet Count 06/24/2024 270  150 - 450 x10E3/uL Final    Neutrophils 06/24/2024 59  Not Estab. % Final    Lymphocytes 06/24/2024 29  Not Estab. % Final    Monocytes 06/24/2024 10  Not Estab. % Final    Eosinophils 06/24/2024 0  Not Estab. % Final    Basophils PCT 06/24/2024 1  Not Estab. % Final    Neutrophils (Absolute) 06/24/2024 3.9  1.4 - 7.0 x10E3/uL Final    Lymphocytes (Absolute) 06/24/2024 1.9  0.7 - 3.1 x10E3/uL Final    Monocytes (Absolute) 06/24/2024 0.6  0.1 - 0.9 x10E3/uL Final     Eosinophils (Absolute) 06/24/2024 0.0  0.0 - 0.4 x10E3/uL Final    Basophils ABS 06/24/2024 0.1  0.0 - 0.2 x10E3/uL Final    Immature Granulocytes 06/24/2024 1  Not Estab. % Final    Immature Granulocytes (Absolute) 06/24/2024 0.0  0.0 - 0.1 x10E3/uL Final   Ancillary Orders on 06/07/2024   Component Date Value Ref Range Status    White Blood Cell Count 06/11/2024 8.0  3.4 - 10.8 x10E3/uL Final    Red Blood Cell Count 06/11/2024 4.89  4.14 - 5.80 x10E6/uL Final    Hemoglobin 06/11/2024 13.9  13.0 - 17.7 g/dL Final    HCT 06/11/2024 40.8  37.5 - 51.0 % Final    MCV 06/11/2024 83  79 - 97 fL Final    MCH 06/11/2024 28.4  26.6 - 33.0 pg Final    MCHC 06/11/2024 34.1  31.5 - 35.7 g/dL Final    RDW 06/11/2024 13.0  11.6 - 15.4 % Final    Platelet Count 06/11/2024 291  150 - 450 x10E3/uL Final    Neutrophils 06/11/2024 66  Not Estab. % Final    Lymphocytes 06/11/2024 23  Not Estab. % Final    Monocytes 06/11/2024 10  Not Estab. % Final    Eosinophils 06/11/2024 0  Not Estab. % Final    Basophils PCT 06/11/2024 1  Not Estab. % Final    Neutrophils (Absolute) 06/11/2024 5.3  1.4 - 7.0 x10E3/uL Final    Lymphocytes (Absolute) 06/11/2024 1.8  0.7 - 3.1 x10E3/uL Final    Monocytes (Absolute) 06/11/2024 0.8  0.1 - 0.9 x10E3/uL Final    Eosinophils (Absolute) 06/11/2024 0.0  0.0 - 0.4 x10E3/uL Final    Basophils ABS 06/11/2024 0.1  0.0 - 0.2 x10E3/uL Final    Immature Granulocytes 06/11/2024 0  Not Estab. % Final    Immature Granulocytes (Absolute) 06/11/2024 0.0  0.0 - 0.1 x10E3/uL Final   Orders Only on 05/30/2024   Component Date Value Ref Range Status    Cholesterol, Total 06/10/2024 203 (H)  100 - 199 mg/dL Final    Triglycerides 06/10/2024 215 (H)  0 - 149 mg/dL Final    HDL 06/10/2024 33 (L)  >39 mg/dL Final    VLDL Cholesterol Calculated 06/10/2024 39  5 - 40 mg/dL Final    LDL Calculated 06/10/2024 131 (H)  0 - 99 mg/dL Final    LDL CALC COMMENT 06/10/2024 CANCELED   Final-Edited    Comment: Test not performed    Result  canceled by the ancillary.      LDl/HDL Ratio 06/10/2024 4.0 (H)  0.0 - 3.6 ratio Final    Comment:                                     LDL/HDL Ratio                                              Men  Women                                1/2 Avg.Risk  1.0    1.5                                    Avg.Risk  3.6    3.2                                 2X Avg.Risk  6.2    5.0                                 3X Avg.Risk  8.0    6.1      Hemoglobin A1C 06/10/2024 5.5  4.8 - 5.6 % Final    Comment:          Prediabetes: 5.7 - 6.4           Diabetes: >6.4           Glycemic control for adults with diabetes: <7.0      Estimated Average Glucose 06/10/2024 111  mg/dL Final   Orders Only on 04/22/2024   Component Date Value Ref Range Status    White Blood Cell Count 04/22/2024 6.3  3.4 - 10.8 x10E3/uL Final    Red Blood Cell Count 04/22/2024 4.94  4.14 - 5.80 x10E6/uL Final    Hemoglobin 04/22/2024 14.5  13.0 - 17.7 g/dL Final    HCT 04/22/2024 43.5  37.5 - 51.0 % Final    MCV 04/22/2024 88  79 - 97 fL Final    MCH 04/22/2024 29.4  26.6 - 33.0 pg Final    MCHC 04/22/2024 33.3  31.5 - 35.7 g/dL Final    RDW 04/22/2024 12.6  11.6 - 15.4 % Final    Platelet Count 04/22/2024 291  150 - 450 x10E3/uL Final    Neutrophils 04/22/2024 64  Not Estab. % Final    Lymphocytes 04/22/2024 26  Not Estab. % Final    Monocytes 04/22/2024 9  Not Estab. % Final    Eosinophils 04/22/2024 0  Not Estab. % Final    Basophils PCT 04/22/2024 1  Not Estab. % Final    Neutrophils (Absolute) 04/22/2024 4.0  1.4 - 7.0 x10E3/uL Final    Lymphocytes (Absolute) 04/22/2024 1.7  0.7 - 3.1 x10E3/uL Final    Monocytes (Absolute) 04/22/2024 0.6  0.1 - 0.9 x10E3/uL Final    Eosinophils (Absolute) 04/22/2024 0.0  0.0 - 0.4 x10E3/uL Final    Basophils ABS 04/22/2024 0.1  0.0 - 0.2 x10E3/uL Final    Immature Granulocytes 04/22/2024 0  Not Estab. % Final    Immature Granulocytes (Absolute) 04/22/2024 0.0  0.0 - 0.1 x10E3/uL Final   Orders Only on 04/15/2024   Component  Date Value Ref Range Status    White Blood Cell Count 04/15/2024 7.6  3.4 - 10.8 x10E3/uL Final    Red Blood Cell Count 04/15/2024 4.99  4.14 - 5.80 x10E6/uL Final    Hemoglobin 04/15/2024 14.5  13.0 - 17.7 g/dL Final    HCT 04/15/2024 43.3  37.5 - 51.0 % Final    MCV 04/15/2024 87  79 - 97 fL Final    MCH 04/15/2024 29.1  26.6 - 33.0 pg Final    MCHC 04/15/2024 33.5  31.5 - 35.7 g/dL Final    RDW 04/15/2024 12.6  11.6 - 15.4 % Final    Platelet Count 04/15/2024 284  150 - 450 x10E3/uL Final    Neutrophils 04/15/2024 60  Not Estab. % Final    Lymphocytes 04/15/2024 29  Not Estab. % Final    Monocytes 04/15/2024 9  Not Estab. % Final    Eosinophils 04/15/2024 1  Not Estab. % Final    Basophils PCT 04/15/2024 1  Not Estab. % Final    Neutrophils (Absolute) 04/15/2024 4.6  1.4 - 7.0 x10E3/uL Final    Lymphocytes (Absolute) 04/15/2024 2.2  0.7 - 3.1 x10E3/uL Final    Monocytes (Absolute) 04/15/2024 0.7  0.1 - 0.9 x10E3/uL Final    Eosinophils (Absolute) 04/15/2024 0.1  0.0 - 0.4 x10E3/uL Final    Basophils ABS 04/15/2024 0.1  0.0 - 0.2 x10E3/uL Final    Immature Granulocytes 04/15/2024 0  Not Estab. % Final    Immature Granulocytes (Absolute) 04/15/2024 0.0  0.0 - 0.1 x10E3/uL Final   Ancillary Orders on 04/12/2024   Component Date Value Ref Range Status    White Blood Cell Count 04/29/2024 6.3  3.4 - 10.8 x10E3/uL Final    Red Blood Cell Count 04/29/2024 4.99  4.14 - 5.80 x10E6/uL Final    Hemoglobin 04/29/2024 14.8  13.0 - 17.7 g/dL Final    HCT 04/29/2024 42.6  37.5 - 51.0 % Final    MCV 04/29/2024 85  79 - 97 fL Final    MCH 04/29/2024 29.7  26.6 - 33.0 pg Final    MCHC 04/29/2024 34.7  31.5 - 35.7 g/dL Final    RDW 04/29/2024 12.7  11.6 - 15.4 % Final    Platelet Count 04/29/2024 297  150 - 450 x10E3/uL Final    Neutrophils 04/29/2024 62  Not Estab. % Final    Lymphocytes 04/29/2024 27  Not Estab. % Final    Monocytes 04/29/2024 10  Not Estab. % Final    Eosinophils 04/29/2024 0  Not Estab. % Final    Basophils PCT  04/29/2024 1  Not Estab. % Final    Neutrophils (Absolute) 04/29/2024 3.8  1.4 - 7.0 x10E3/uL Final    Lymphocytes (Absolute) 04/29/2024 1.7  0.7 - 3.1 x10E3/uL Final    Monocytes (Absolute) 04/29/2024 0.6  0.1 - 0.9 x10E3/uL Final    Eosinophils (Absolute) 04/29/2024 0.0  0.0 - 0.4 x10E3/uL Final    Basophils ABS 04/29/2024 0.1  0.0 - 0.2 x10E3/uL Final    Immature Granulocytes 04/29/2024 0  Not Estab. % Final    Immature Granulocytes (Absolute) 04/29/2024 0.0  0.0 - 0.1 x10E3/uL Final   Orders Only on 04/08/2024   Component Date Value Ref Range Status    White Blood Cell Count 04/08/2024 6.9  3.4 - 10.8 x10E3/uL Final    Red Blood Cell Count 04/08/2024 5.06  4.14 - 5.80 x10E6/uL Final    Hemoglobin 04/08/2024 14.8  13.0 - 17.7 g/dL Final    HCT 04/08/2024 42.4  37.5 - 51.0 % Final    MCV 04/08/2024 84  79 - 97 fL Final    MCH 04/08/2024 29.2  26.6 - 33.0 pg Final    MCHC 04/08/2024 34.9  31.5 - 35.7 g/dL Final    RDW 04/08/2024 12.6  11.6 - 15.4 % Final    Platelet Count 04/08/2024 283  150 - 450 x10E3/uL Final    Neutrophils 04/08/2024 62  Not Estab. % Final    Lymphocytes 04/08/2024 26  Not Estab. % Final    Monocytes 04/08/2024 10  Not Estab. % Final    Eosinophils 04/08/2024 1  Not Estab. % Final    Basophils PCT 04/08/2024 1  Not Estab. % Final    Neutrophils (Absolute) 04/08/2024 4.3  1.4 - 7.0 x10E3/uL Final    Lymphocytes (Absolute) 04/08/2024 1.8  0.7 - 3.1 x10E3/uL Final    Monocytes (Absolute) 04/08/2024 0.7  0.1 - 0.9 x10E3/uL Final    Eosinophils (Absolute) 04/08/2024 0.0  0.0 - 0.4 x10E3/uL Final    Basophils ABS 04/08/2024 0.1  0.0 - 0.2 x10E3/uL Final    Immature Granulocytes 04/08/2024 0  Not Estab. % Final    Immature Granulocytes (Absolute) 04/08/2024 0.0  0.0 - 0.1 x10E3/uL Final   Ancillary Orders on 03/29/2024   Component Date Value Ref Range Status    White Blood Cell Count 04/01/2024 7.1  3.4 - 10.8 x10E3/uL Final    Red Blood Cell Count 04/01/2024 4.96  4.14 - 5.80 x10E6/uL Final     Hemoglobin 04/01/2024 14.3  13.0 - 17.7 g/dL Final    HCT 04/01/2024 42.6  37.5 - 51.0 % Final    MCV 04/01/2024 86  79 - 97 fL Final    MCH 04/01/2024 28.8  26.6 - 33.0 pg Final    MCHC 04/01/2024 33.6  31.5 - 35.7 g/dL Final    RDW 04/01/2024 12.8  11.6 - 15.4 % Final    Platelet Count 04/01/2024 292  150 - 450 x10E3/uL Final    Neutrophils 04/01/2024 59  Not Estab. % Final    Lymphocytes 04/01/2024 29  Not Estab. % Final    Monocytes 04/01/2024 10  Not Estab. % Final    Eosinophils 04/01/2024 1  Not Estab. % Final    Basophils PCT 04/01/2024 1  Not Estab. % Final    Neutrophils (Absolute) 04/01/2024 4.2  1.4 - 7.0 x10E3/uL Final    Lymphocytes (Absolute) 04/01/2024 2.1  0.7 - 3.1 x10E3/uL Final    Monocytes (Absolute) 04/01/2024 0.7  0.1 - 0.9 x10E3/uL Final    Eosinophils (Absolute) 04/01/2024 0.1  0.0 - 0.4 x10E3/uL Final    Basophils ABS 04/01/2024 0.1  0.0 - 0.2 x10E3/uL Final    Immature Granulocytes 04/01/2024 0  Not Estab. % Final    Immature Granulocytes (Absolute) 04/01/2024 0.0  0.0 - 0.1 x10E3/uL Final   Orders Only on 03/25/2024   Component Date Value Ref Range Status    White Blood Cell Count 03/25/2024 6.9  3.4 - 10.8 x10E3/uL Final    Red Blood Cell Count 03/25/2024 5.06  4.14 - 5.80 x10E6/uL Final    Hemoglobin 03/25/2024 14.4  13.0 - 17.7 g/dL Final    HCT 03/25/2024 43.7  37.5 - 51.0 % Final    MCV 03/25/2024 86  79 - 97 fL Final    MCH 03/25/2024 28.5  26.6 - 33.0 pg Final    MCHC 03/25/2024 33.0  31.5 - 35.7 g/dL Final    RDW 03/25/2024 12.3  11.6 - 15.4 % Final    Platelet Count 03/25/2024 284  150 - 450 x10E3/uL Final    Neutrophils 03/25/2024 60  Not Estab. % Final    Lymphocytes 03/25/2024 28  Not Estab. % Final    Monocytes 03/25/2024 10  Not Estab. % Final    Eosinophils 03/25/2024 1  Not Estab. % Final    Basophils PCT 03/25/2024 1  Not Estab. % Final    Neutrophils (Absolute) 03/25/2024 4.2  1.4 - 7.0 x10E3/uL Final    Lymphocytes (Absolute) 03/25/2024 2.0  0.7 - 3.1 x10E3/uL Final     Monocytes (Absolute) 03/25/2024 0.7  0.1 - 0.9 x10E3/uL Final    Eosinophils (Absolute) 03/25/2024 0.1  0.0 - 0.4 x10E3/uL Final    Basophils ABS 03/25/2024 0.1  0.0 - 0.2 x10E3/uL Final    Immature Granulocytes 03/25/2024 0  Not Estab. % Final    Immature Granulocytes (Absolute) 03/25/2024 0.0  0.0 - 0.1 x10E3/uL Final   Orders Only on 03/18/2024   Component Date Value Ref Range Status    White Blood Cell Count 03/18/2024 7.1  3.4 - 10.8 x10E3/uL Final    Red Blood Cell Count 03/18/2024 4.85  4.14 - 5.80 x10E6/uL Final    Hemoglobin 03/18/2024 13.9  13.0 - 17.7 g/dL Final    HCT 03/18/2024 41.3  37.5 - 51.0 % Final    MCV 03/18/2024 85  79 - 97 fL Final    MCH 03/18/2024 28.7  26.6 - 33.0 pg Final    MCHC 03/18/2024 33.7  31.5 - 35.7 g/dL Final    RDW 03/18/2024 12.4  11.6 - 15.4 % Final    Platelet Count 03/18/2024 308  150 - 450 x10E3/uL Final    Neutrophils 03/18/2024 63  Not Estab. % Final    Lymphocytes 03/18/2024 25  Not Estab. % Final    Monocytes 03/18/2024 9  Not Estab. % Final    Eosinophils 03/18/2024 2  Not Estab. % Final    Basophils PCT 03/18/2024 1  Not Estab. % Final    Neutrophils (Absolute) 03/18/2024 4.4  1.4 - 7.0 x10E3/uL Final    Lymphocytes (Absolute) 03/18/2024 1.8  0.7 - 3.1 x10E3/uL Final    Monocytes (Absolute) 03/18/2024 0.7  0.1 - 0.9 x10E3/uL Final    Eosinophils (Absolute) 03/18/2024 0.1  0.0 - 0.4 x10E3/uL Final    Basophils ABS 03/18/2024 0.1  0.0 - 0.2 x10E3/uL Final    Immature Granulocytes 03/18/2024 0  Not Estab. % Final    Immature Granulocytes (Absolute) 03/18/2024 0.0  0.0 - 0.1 x10E3/uL Final   There may be more visits with results that are not included.             ___________________________________    History Review: The following portions of the patient's history were reviewed and updated as appropriate: allergies, current medications, past family history, past medical history, past social history, past surgical history, and problem list.    Unchanged information from  "this writer's previous assessment is copied and italicized; information that has changed is bolded.  Past Psychiatric History:      Past psychiatric diagnoses:   Schizophrenia, schizoaffective d/o, anxiety, depression   Inpatient psychiatric admissions:   10 times total (4 were involuntary)  First - age 22 after stealing sisters klonopin  Most common reason: first few were \"no reason\"  2017 - got invega shot was a very influential moment  2 suicide attempts/SI: SI and wanting to jump off a roof (2021) a few months later after sleeping pills, tried to hang himself 2 years ago  Psychosis 3 times (most recent for that reason was 3 years)  Most recent was 2 years ago  Has stayed out of the hospital due to \"taking his medications, not having suicidal ideation\"  Denies history of ECT, denies having had ICM in past.   Prior outpatient psychiatric treatment:   Saw Chilo Ramsey for 3 years  Saw psychiatrist at [facility] for a year before that  Past/current psychotherapy:   Worked with a therapist at [facility] Masood, stopped seeing 1-2 years ago because he left the practice  History of suicidal attempts/gestures:   2: took sleeping pills (August 2021), tried to hang self two years  History of non-suicidal self-injurious behavior:   None  History of violence/aggressive behaviors:   None  Psychotropic medication trials:   Invega, (discomfort) risperdal, zyprexa, thorazine (all unknown reasons for discontinuation)  Abilify (bad reaction)  Invega SCHAEFER x1 in 2019 (\"hurt his head\")  Ativan ('caused psychosis')  Loxapine - feeling too tired on dosages above 20mg  Risperdal - above 2mg causes nausea and \"hurting\" in his head  Clozapine - current medication  Melatonin - current medication  Substance abuse inpatient/outpatient rehabilitation:   Rehab in 2017 - marijuana use  Eating disorder history:   No  Other services: used to have a  but reports he does not anymore because they ran out of things to do together      Substance " "Abuse History:     States he was smoking a lot of marijuana for 5 years, nearly every day, stopped smoking 2019 July after he \"freaked out\" and thought \"there were people in his basement and living in his house and torturing him. \"     Denies any other recreational drug use and otherwise denies history of alcohol, illict substance, or tobacco abuse., Patient denies previous legal actions or arrests related to substance intoxication including prior DWIs/DUIs., Patient does not exhibit objective evidence of substance withdrawal during today's examination nor do they appear under the influence of any psychoactive substance.       Family Psychiatric History:      Family History   History reviewed. No pertinent family history.         Psychiatric Family History: Per patient he is adopted,  Per mother is not adopted. Maternal grandfather - schizophrenia  Tex's half sister has bipolar disorder     Social History:     Developmental: Was in special education, denies IEP  Per care everywhere, mild developmental delay, early intervention at age 2.5. Concern for autism, diagnosed with ADHD.  Has 2 sister (50 and 34 years old), states he was adopted but his mother reports he is not adopted  Education: high school diploma/GED  Marital history: single  Children: none  Living arrangement, social support: Family is supportive, mom is supportive. Lives with mother, on a waiting list for housing, on disability for schizoaffective disorder.   Dad lives in [place]   Occupational History: Permanent disability. 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 , Patient denies history of arrests or violence with a deadly weapon.    history: None     Traumatic History:      Abuse: Denies  Other Traumatic Events: Bullying, and would " not disclose further.  ___________________________________      Visit Time    Visit Start Time: 2:50  Visit Stop Time: 3:15  Total Visit Duration:  25 minutes    Lauren Stockton MD   06/26/24

## 2024-06-26 ENCOUNTER — OFFICE VISIT (OUTPATIENT)
Dept: PSYCHIATRY | Facility: CLINIC | Age: 29
End: 2024-06-26

## 2024-06-26 DIAGNOSIS — F20.9 SCHIZOPHRENIA, UNSPECIFIED TYPE (HCC): Primary | ICD-10-CM

## 2024-06-26 DIAGNOSIS — T50.905A WEIGHT GAIN DUE TO MEDICATION: ICD-10-CM

## 2024-06-26 DIAGNOSIS — K59.03 DRUG-INDUCED CONSTIPATION: ICD-10-CM

## 2024-06-26 DIAGNOSIS — R63.5 WEIGHT GAIN DUE TO MEDICATION: ICD-10-CM

## 2024-06-26 DIAGNOSIS — G47.20 DISRUPTED SLEEP-WAKE CYCLE: ICD-10-CM

## 2024-06-26 DIAGNOSIS — E78.00 HIGH CHOLESTEROL: ICD-10-CM

## 2024-06-26 DIAGNOSIS — F51.8 DISRUPTED SLEEP-WAKE CYCLE: ICD-10-CM

## 2024-06-26 PROBLEM — F25.0 SCHIZOAFFECTIVE DISORDER, BIPOLAR TYPE (HCC): Status: RESOLVED | Noted: 2023-01-17 | Resolved: 2024-06-26

## 2024-06-26 RX ORDER — SENNOSIDES 8.6 MG
8.6 TABLET ORAL
Qty: 30 TABLET | Refills: 2 | Status: SHIPPED | OUTPATIENT
Start: 2024-06-26

## 2024-06-26 RX ORDER — CHLORAL HYDRATE 500 MG
1000 CAPSULE ORAL DAILY
Qty: 30 CAPSULE | Refills: 2 | Status: SHIPPED | OUTPATIENT
Start: 2024-06-26

## 2024-06-26 RX ORDER — UREA 10 %
1 LOTION (ML) TOPICAL
Qty: 30 TABLET | Refills: 2 | Status: SHIPPED | OUTPATIENT
Start: 2024-06-26

## 2024-06-26 RX ORDER — CLOZAPINE 50 MG/1
75 TABLET ORAL
Qty: 45 TABLET | Refills: 2 | Status: SHIPPED | OUTPATIENT
Start: 2024-06-26

## 2024-06-26 RX ORDER — CLOZAPINE 50 MG/1
TABLET ORAL
Qty: 21 TABLET | OUTPATIENT
Start: 2024-06-26

## 2024-06-26 RX ORDER — RISPERIDONE 2 MG/1
2 TABLET ORAL
Qty: 30 TABLET | Refills: 2 | Status: SHIPPED | OUTPATIENT
Start: 2024-06-26

## 2024-06-26 NOTE — PATIENT INSTRUCTIONS
[facility] [place] [address]  Phone: [phone]  Fax: [phone]     Please call the office nursing staff for medication issues including refills, problems getting medications, bothersome side effects, etc at [phone].     Please return for a follow up appointment as discussed and arrive approximately 15 minutes prior to your appointment time. If you are running late or are unable to attend your appointment, please call our office at [phone]    If you have thoughts of harming yourself or are otherwise in psychological crisis, do not hesitate to contact your County Crisis hotline, or 851 or go to the nearest emergency room.  [facility] Crisis: [phone]  [facility] Crisis: [phone]  [facility] Crisis: [phone]  [facility] Crisis: [phone]  [facility] Crisis: [phone]  [facility] Crisis: [phone]  [facility] Crisis: [phone]  National Suicide Prevention Hotline: 1-503.721.4052 or call 525

## 2024-06-26 NOTE — TELEPHONE ENCOUNTER
Client's mom, Nela, came into the office to inquire about refill request made yesterday for Clozaril. Mom visited the pharmacy initially, then pharmacy sent her to speak with Dr. Stockton's MR (writer). Mom states client is out of med, but,  also mentions client has an appt this afternoon at 3PM. Writer suggests to mom to make sure that client communicates his need for refills to Dr. Stockton, along with the fact that he is out of med. Mom is worried med will not be ready for  before the pharmacy ([facility]) closes at 5PM.

## 2024-07-05 ENCOUNTER — SOCIAL WORK (OUTPATIENT)
Dept: BEHAVIORAL/MENTAL HEALTH CLINIC | Facility: CLINIC | Age: 29
End: 2024-07-05
Payer: COMMERCIAL

## 2024-07-05 DIAGNOSIS — F20.9 SCHIZOPHRENIA, UNSPECIFIED TYPE (HCC): Primary | ICD-10-CM

## 2024-07-05 DIAGNOSIS — F12.11 CANNABIS ABUSE, IN REMISSION: ICD-10-CM

## 2024-07-05 PROCEDURE — 90837 PSYTX W PT 60 MINUTES: CPT | Performed by: COUNSELOR

## 2024-07-08 NOTE — PSYCH
"Behavioral Health Psychotherapy Progress Note    Psychotherapy Provided: Individual Psychotherapy     1. Schizophrenia, unspecified type (HCC)        2. Cannabis abuse, in remission            Goals addressed in session: Goal 1     DATA: Sudhakar processed his feelings around side effects form taking medication in shot form including impotency. He shared that masturbation led to depressed mood in the past. He identified being outdoors and exercise as coping skills. Clt identified some things as delusions but still believes certain people have attempted or desired to kill him in the past. Clinician provided validation and support. Sudhakar reported having dreams about drug use and the problems drug use have caused him in the past. He reported that he stole his sister's prescription (benzodiazepines) but returned it. Aquilest said that cannabis causes psychotic symptoms for him and he does not want to have another episode. He is able to remain clean by reminding himself of consequences from drug use.   During this session, this clinician used the following therapeutic modalities: Client-centered Therapy    Substance Abuse was addressed during this session. If the client is diagnosed with a co-occurring substance use disorder, please indicate any changes in the frequency or amount of use: none. Stage of change for addressing substance use diagnoses: Maintenance    ASSESSMENT:  Tex Hill presents with a Dysthymic mood.     his affect is Normal range and intensity, which is congruent, with his mood and the content of the session. The client has made progress on their goals.     Tex Hill presents with a minimal risk of suicide, minimal risk of self-harm, and minimal risk of harm to others.    For any risk assessment that surpasses a \"low\" rating, a safety plan must be developed.    A safety plan was indicated: no  If yes, describe in detail N/A    PLAN: Between sessions, Tex Hill will use his supports and coping " skills. At the next session, the therapist will use Dialectical Behavior Therapy to address anxiety.    Behavioral Health Treatment Plan and Discharge Planning: Tex Hill is aware of and agrees to continue to work on their treatment plan. They have identified and are working toward their discharge goals. yes    Visit start and stop times:    07/08/24  Start Time: 1505  Stop Time: 1600  Total Visit Time: 55 minutes

## 2024-07-18 ENCOUNTER — SOCIAL WORK (OUTPATIENT)
Dept: BEHAVIORAL/MENTAL HEALTH CLINIC | Facility: CLINIC | Age: 29
End: 2024-07-18
Payer: COMMERCIAL

## 2024-07-18 DIAGNOSIS — F12.11 CANNABIS ABUSE, IN REMISSION: ICD-10-CM

## 2024-07-18 DIAGNOSIS — F20.9 SCHIZOPHRENIA, UNSPECIFIED TYPE (HCC): Primary | ICD-10-CM

## 2024-07-18 PROCEDURE — 90837 PSYTX W PT 60 MINUTES: CPT | Performed by: COUNSELOR

## 2024-07-18 NOTE — BH CRISIS PLAN
Client Name: eTx Hill       Client YOB: 1995    Rod[facility]azan Safety Plan      Creation Date: 7/18/24 Update Date: 7/18/25   Created By: SAQIB Valladares Last Updated By: SAQIB Valladares      Step 1: Warning Signs:   Warning Signs   discomfort   feeling suicidal            Step 2: Internal Coping Strategies:   Internal Coping Strategies   sleep   journal   say something mean to get it out not at someone   pace            Step 3: People and social settings that provide distraction:   Name Contact Information   mother [phone]    Places   my house           Step 4: People whom I can ask for help during a crisis:      Name Contact Information    mother in cell      Step 5: Professionals or agencies I can contact during a crisis:      Clinican/Agency Name Phone Emergency Contact    [facility]is-MARIA DEL CARMEN Pinedo [phone]      Local Emergency Department Emergency Department Phone Emergency Department Address    [address] [place]        Crisis Phone Numbers:   Suicide Prevention Lifeline: Call or Text  338 Crisis Text Line: Text HOME to 880-576   Please note: Some [facility] do not have a separate number for Child/Adolescent specific crisis. If your county is not listed under Child/Adolescent, please call the adult number for your county      Adult Crisis Numbers: Child/Adolescent Crisis Numbers   [facility]: [phone] [facility]: [phone]   [facility]: [phone] [facility]: [phone]   [facility]: [phone] [place]: [phone]   [facility]: [phone] [place]: [phone]   [place] [facility]: [phone]   [facility]: [phone]   [facility]: [phone]   [facility]: [phone] (daytime) [phone] (after hours, weekends, holidays)      Step 6: Making the environment safer (plan for lethal means safety):   Patient did  not identify any lethal methods: Yes     Optional: What is most important to me and worth living for?      Bhanu Safety Plan. Sandie Velasco and Evaristo Hand. Used with permission of the authors.

## 2024-07-18 NOTE — PSYCH
"Behavioral Health Psychotherapy Progress Note    Psychotherapy Provided: Individual Psychotherapy     1. Schizophrenia, unspecified type (HCC)        2. Cannabis abuse, in remission            Goals addressed in session: Goal 1     DATA: Sudhakar reported that he is aware that certain conversations he thoughts were over the phone with his neighbor did not happen. Sudhakar continues to talk about rap singer who is his cousin who was upset clt refused to meet him in the past. He said that he cannot be sexually aroused after having the shot. Sudhakar shared his feelings around family dynamics and friendships. He identified that his parents are supportive. Sudhakar shared that he used to have SI, \"every minute\" but this is no longer the case. We reviewed his safety plan and clinician gave him a copy. Sudhakar processed trauma around many people wanting to kill him out of jealousy in the past. We discussed ways to fel safe in his environment.  During this session, this clinician used the following therapeutic modalities: Client-centered Therapy    Substance Abuse was addressed during this session. If the client is diagnosed with a co-occurring substance use disorder, please indicate any changes in the frequency or amount of use: none. Stage of change for addressing substance use diagnoses: Maintenance    ASSESSMENT:  Tex Hill presents with a Dysthymic mood.     his affect is Blunted, which is congruent, with his mood and the content of the session. The client has not made progress on their goals.     Tex Hill presents with a minimal risk of suicide, minimal risk of self-harm, and minimal risk of harm to others.    For any risk assessment that surpasses a \"low\" rating, a safety plan must be developed.    A safety plan was indicated: no  If yes, describe in detail N/A    PLAN: Between sessions, Tex Hill will use his supports and coping skills and safety plan as needed. At the next session, the therapist will use " Client-centered Therapy to address stress.    Behavioral Health Treatment Plan and Discharge Planning: Tex Hill is aware of and agrees to continue to work on their treatment plan. They have identified and are working toward their discharge goals. yes    Visit start and stop times:    07/18/24

## 2024-07-22 NOTE — PSYCH
MEDICATION MANAGEMENT NOTE      [facility]: [facility]   Mental Health Outpatient Services   [address] [place] [id number]   [phone]    Name and Date of Birth:  Tex Hill 29 y.o. 1995 MRN: [id number]    Date of Visit: July 24, 2024    Reason for Visit: Follow-up visit regarding medication management     _____________________________    Assessment & Plan   Tex Hill is a 29 y.o. male,  Single,  domiciled with mother, on permanent disability, with past medical history of Vitamin D deficiency, prior psychiatric diagnoses of schizoaffective disorder; multiple hospitalizations and two suicide attempts (most recently 2021).  Tex was personally seen and evaluated today at the [facility] outpatient clinic for follow-up regarding medication management.        On assessment, Tex Hill reports that things have been relatively stable overall since last appointment, but he still continues to struggle with fatigue.  He continues to attribute this to history of Invega injection, provided psychoeducation.  He reports adequate sleep, appetite.  He has been caring for himself.  He denies significant episodes of talking to himself, though does state he at times will talk to himself for a couple of seconds.  He denies hearing the voice of his bully.  He denies thoughts to harm himself or others.  He does report possible intrusive/obsessive thoughts, which we will plan to explore at future appointments, and gave him YBOCS to complete to further assess these possible symptoms.  At this time he still declines completing MRI.  He has been adherent with his medication and denies any significant side effects.  We will plan to follow-up in approximately 1 month.  Encouraged him to seek out social interaction with peers.  Patient is in agreement with the treatment plan as detailed below, and agrees to call the office with any concerns or side  effects between appointments.     DSM-5 Diagnoses/Visit Diagnoses:     1. Schizophrenia, unspecified type (HCC)    2. High cholesterol    3. Screening for endocrine, nutritional, metabolic and immunity disorder    4. Vitamin D deficiency, unspecified    5. Anhedonia      Treatment Recommendations/Precautions:  Continue psychopharmacological management as follows:  Continue risperdal 2mg HS for psychosis  Clozapine 75mg HS, for psychosis  CBC with differential every other week per REMS program   Clozapine level = 70 (low) on 12/11 on 100mg dosage  AIMS = 0 on 5/1/24  Continue vitamin D supplementation for deficiency  Start fish oil supplementation for brain health and for high triglycerides (has not yet been started as capsules are too large, ordering smaller capsules)  Continue daily sennakot for clozapine induced constipation  Melatonin 1mg HS PRN for sleep-wake cycle regulation  Labs most recently obtained every other week, reviewed.   Ordering Vitamin B12 level for anhedonia/low energy, vitamin D level for deficiency history and low energy, repeat CMP, TSH due to low energy  Follow up in 3 weeks for medication management  Providing Y-BOCS to be completed prior to next appointment to evaluate for obsessive/intrusive thoughts  Referred previously for nutrition services due to medication induced weight gain and hyperlipidemia  Continue individual psychotherapy with Eleni Bowers  [place] base with behavioral health navigator about his historical involvement in CM   Referred previously for psychological/neuropsychological testing for autism evaluation  Referred for MRI and encouraged this to be completed for psychosis workup and head pain/pressure symptoms - pt declining this, still discussing with him at each visit to encourage him to complete this  Follow up with PCP for medical issues and ongoing care  Aware of 24 hour and weekend coverage for urgent situations accessed by calling [facility]  "Mental Health Outpatient main practice number    Individual psychotherapy provided: No     Treatment Plan:     Completed and signed during the session: Not applicable - Treatment Plan not due at this session    Medications Risks/Benefits:      Risks, Benefits And Possible Side Effects Of Medications:    Risks, benefits, and possible side effects of medications explained to Tex and he verbalizes understanding and agreement for treatment.     Controlled Medication Discussion:     Not applicable    Medical Decision Making / Counseling / Coordination of Care:  The following interventions are recommended: return in 1 month for follow up.  Although patient's acute lethality risk is LOW, long-term/chronic lethality risk is mildly elevated given the risk factors listed above. However, at the current moment, Tex is future-oriented, forward-thinking, and demonstrates ability to act in a self-preserving manner as evidenced by volitionally seeking psychiatric evaluation and treatment today. To mitigate future risk, patient should adhere to treatment recommendations, avoid alcohol/illicit substance use, utilize community-based resources and familiar support, and prioritize mental health treatment. The diagnosis and treatment plan were reviewed with the patient. Risks, benefits, and alternatives to treatment were discussed. The importance of medication and treatment compliance was reviewed with the patient.     _____________________________________________    History of Present Illness     Chief Complaint: \"I'm okay, I'm too tired to do anything\"    SUBJECTIVE:    Tex Hill is a 29 y.o. male, possessing a past psychiatric history significant for schizophrenia, who was personally seen and evaluated at the [facility] outpatient clinic  for follow-up regarding medication management.  At their last visit, the plan was to add fish oil supplementation due to hypertriglyceridemia and brain health, and continue " "other medications, as well as refer to nutrition specialist.    Tex states that since their previous psychiatric appointment with this writer, he has been relatively stable. He continues to report very low energy and anhedonia, which he continues to cite as caused by the one invega injection he got years ago. He is resistant to discussion of alternative causes, but is agreeable to blood work for this. We discussed his issues with ejaculation, and he continues to decline referral to urologist to discuss this issue with them. He denies any side effects such as galactorrhea, chest pain, fever. He continues to feel tired and low energy. At the end of the visit today he reports having thoughts/fears of being arrested, and at times fearful of spending time with his  friend for fear they will be harmed. When this writer bought up intrusive thoughts and possibility of obsessive thoughts, Tex is willing to complete Y-BOCs. He denies thoughts to harm himself or others.     Tex denies current AH, VH, and denies talking to himself often (is occasionally talking to self), not currently hearing voice of his bully. He reports that he now believes his past thoughts of there being \"a ton of girls and a rapper on my porch\" as being delusions and that these events did not happen.     Presently, patient denies suicidal/homicidal ideation in addition to thoughts of self-injury.  At conclusion of evaluation, patient is amenable to the recommendations of this writer including: continue psychotropic medications as prescribed.  Also, patient is amenable to calling/contacting the outpatient office including this writer if any acute adverse effects of their medication regimen arise in addition to any comments or concerns pertaining to their psychiatric management.  Patient is amenable to calling/contacting crisis and/or attending to the nearest emergency department if their clinical condition deteriorates to assure their " safety and stability, stating that they are able to appropriately confide in their mother regarding their psychiatric state.    Current Rating Scores:     Current PHQ-9   PHQ-2/9 Depression Screening           Current JESSE-7 is .    Psychiatric Review Of Systems:  Unchanged information from this writer's previous assessment is copied and italicized; information that has changed is bolded.    Appetite: denies  Adverse eating: no  Weight changes: denies  Insomnia/sleeplessness: sleeping well   Fatigue/anergy: denies  Anhedonia/lack of interest: stable - started playing The [employer] of Us, and [employer] legends  Attention/concentration: denies   Psychomotor agitation/retardation: no  Somatic symptoms: no  Anxiety/panic attack: denies  Mylene/hypomania: no  Hopelessness/helplessness/worthlessness: no  Self-injurious behavior/high-risk behavior: no  Suicidal ideation: no  Homicidal ideation: no  Auditory hallucinations: denies  Visual hallucinations: no  Other perceptual disturbances: no  Delusional thinking: no  Obsessive/compulsive symptoms: no    Review Of Systems:      Constitutional as noted in HPI   ENT negative   Cardiovascular negative   Respiratory negative   Gastrointestinal negative   Genitourinary negative   Musculoskeletal negative   Integumentary negative   Neurological negative   Endocrine negative   Other Symptoms none, all other systems are negative     Objective    OBJECTIVE:     Visit Vitals  /95 (BP Location: Left arm)   Wt 90.1 kg (198 lb 9.6 oz)   Smoking Status Never      Wt Readings from Last 6 Encounters:   07/24/24 90.1 kg (198 lb 9.6 oz)        No past medical history on file.   No past surgical history on file.    Meds/Allergies    Allergies   Allergen Reactions    Gantrisin [Sulfisoxazole] Hallucinations     Took as a child and had hallucinations per mother     Current Outpatient Medications   Medication Instructions    b complex vitamins capsule 1 capsule, Oral, Daily    cholecalciferol (VITAMIN  D3) 400 Units, Oral, Every morning    clozapine (CLOZARIL) 75 mg, Oral, Daily at bedtime    Fish Oil Burp-Less 1,000 mg, Oral, Daily    melatonin 1 mg, Oral, Daily at bedtime PRN    risperiDONE (RISPERDAL) 2 mg, Oral, Daily at bedtime    senna (SENOKOT) 8.6 mg, Oral, Daily at bedtime           Mental Status Exam:    Appearance age appropriate, casually dressed, seated comfortably, very limited eye contact, appropriate grooming/hygiene   Behavior cooperative, calm   Speech normal rate, normal volume, normal pitch   Mood euthymic   Affect blunted   Thought Processes organized, goal directed   Associations intact associations   Thought Content no overt delusions   Perceptual Disturbances: Denies auditory or visual hallucinations and Does not appear to be responding to internal stimuli   Abnormal Thoughts  Risk Potential Denies suicidal or homicidal ideation, plan, or intent   Orientation oriented to person, place, time/date, and situation   Memory recent and remote memory grossly intact   Consciousness alert and awake   Attention Span Concentration Span attention span and concentration are age appropriate   Intellect appears to be of average intelligence   Insight fair   Judgement fair   Muscle Strength and  Gait normal muscle strength and normal muscle tone, normal gait and normal balance   Motor Activity no abnormal movements   Language no difficulty naming common objects, no difficulty repeating a phrase, no difficulty writing a sentence   Fund of Knowledge adequate knowledge of current events  adequate fund of knowledge regarding past history  adequate fund of knowledge regarding vocabulary      Laboratory Results: I have personally reviewed all pertinent laboratory/tests results    Ancillary Orders on 07/19/2024   Component Date Value Ref Range Status    White Blood Cell Count 07/22/2024 6.2  3.4 - 10.8 x10E3/uL Final    Red Blood Cell Count 07/22/2024 4.89  4.14 - 5.80 x10E6/uL Final    Hemoglobin 07/22/2024 14.2   13.0 - 17.7 g/dL Final    HCT 07/22/2024 41.1  37.5 - 51.0 % Final    MCV 07/22/2024 84  79 - 97 fL Final    MCH 07/22/2024 29.0  26.6 - 33.0 pg Final    MCHC 07/22/2024 34.5  31.5 - 35.7 g/dL Final    RDW 07/22/2024 13.0  11.6 - 15.4 % Final    Platelet Count 07/22/2024 275  150 - 450 x10E3/uL Final    Neutrophils 07/22/2024 65  Not Estab. % Final    Lymphocytes 07/22/2024 26  Not Estab. % Final    Monocytes 07/22/2024 8  Not Estab. % Final    Eosinophils 07/22/2024 0  Not Estab. % Final    Basophils PCT 07/22/2024 1  Not Estab. % Final    Neutrophils (Absolute) 07/22/2024 4.1  1.4 - 7.0 x10E3/uL Final    Lymphocytes (Absolute) 07/22/2024 1.6  0.7 - 3.1 x10E3/uL Final    Monocytes (Absolute) 07/22/2024 0.5  0.1 - 0.9 x10E3/uL Final    Eosinophils (Absolute) 07/22/2024 0.0  0.0 - 0.4 x10E3/uL Final    Basophils ABS 07/22/2024 0.1  0.0 - 0.2 x10E3/uL Final    Immature Granulocytes 07/22/2024 0  Not Estab. % Final    Immature Granulocytes (Absolute) 07/22/2024 0.0  0.0 - 0.1 x10E3/uL Final   Ancillary Orders on 07/05/2024   Component Date Value Ref Range Status    White Blood Cell Count 07/08/2024 6.2  3.4 - 10.8 x10E3/uL Final    Red Blood Cell Count 07/08/2024 4.97  4.14 - 5.80 x10E6/uL Final    Hemoglobin 07/08/2024 14.7  13.0 - 17.7 g/dL Final    HCT 07/08/2024 42.4  37.5 - 51.0 % Final    MCV 07/08/2024 85  79 - 97 fL Final    MCH 07/08/2024 29.6  26.6 - 33.0 pg Final    MCHC 07/08/2024 34.7  31.5 - 35.7 g/dL Final    RDW 07/08/2024 13.4  11.6 - 15.4 % Final    Platelet Count 07/08/2024 272  150 - 450 x10E3/uL Final    Neutrophils 07/08/2024 60  Not Estab. % Final    Lymphocytes 07/08/2024 30  Not Estab. % Final    Monocytes 07/08/2024 9  Not Estab. % Final    Eosinophils 07/08/2024 0  Not Estab. % Final    Basophils PCT 07/08/2024 1  Not Estab. % Final    Neutrophils (Absolute) 07/08/2024 3.7  1.4 - 7.0 x10E3/uL Final    Lymphocytes (Absolute) 07/08/2024 1.8  0.7 - 3.1 x10E3/uL Final    Monocytes (Absolute)  07/08/2024 0.6  0.1 - 0.9 x10E3/uL Final    Eosinophils (Absolute) 07/08/2024 0.0  0.0 - 0.4 x10E3/uL Final    Basophils ABS 07/08/2024 0.1  0.0 - 0.2 x10E3/uL Final    Immature Granulocytes 07/08/2024 0  Not Estab. % Final    Immature Granulocytes (Absolute) 07/08/2024 0.0  0.0 - 0.1 x10E3/uL Final   Ancillary Orders on 06/21/2024   Component Date Value Ref Range Status    White Blood Cell Count 06/24/2024 6.5  3.4 - 10.8 x10E3/uL Final    Red Blood Cell Count 06/24/2024 4.84  4.14 - 5.80 x10E6/uL Final    Hemoglobin 06/24/2024 14.0  13.0 - 17.7 g/dL Final    HCT 06/24/2024 40.9  37.5 - 51.0 % Final    MCV 06/24/2024 85  79 - 97 fL Final    MCH 06/24/2024 28.9  26.6 - 33.0 pg Final    MCHC 06/24/2024 34.2  31.5 - 35.7 g/dL Final    RDW 06/24/2024 13.1  11.6 - 15.4 % Final    Platelet Count 06/24/2024 270  150 - 450 x10E3/uL Final    Neutrophils 06/24/2024 59  Not Estab. % Final    Lymphocytes 06/24/2024 29  Not Estab. % Final    Monocytes 06/24/2024 10  Not Estab. % Final    Eosinophils 06/24/2024 0  Not Estab. % Final    Basophils PCT 06/24/2024 1  Not Estab. % Final    Neutrophils (Absolute) 06/24/2024 3.9  1.4 - 7.0 x10E3/uL Final    Lymphocytes (Absolute) 06/24/2024 1.9  0.7 - 3.1 x10E3/uL Final    Monocytes (Absolute) 06/24/2024 0.6  0.1 - 0.9 x10E3/uL Final    Eosinophils (Absolute) 06/24/2024 0.0  0.0 - 0.4 x10E3/uL Final    Basophils ABS 06/24/2024 0.1  0.0 - 0.2 x10E3/uL Final    Immature Granulocytes 06/24/2024 1  Not Estab. % Final    Immature Granulocytes (Absolute) 06/24/2024 0.0  0.0 - 0.1 x10E3/uL Final   Ancillary Orders on 06/07/2024   Component Date Value Ref Range Status    White Blood Cell Count 06/11/2024 8.0  3.4 - 10.8 x10E3/uL Final    Red Blood Cell Count 06/11/2024 4.89  4.14 - 5.80 x10E6/uL Final    Hemoglobin 06/11/2024 13.9  13.0 - 17.7 g/dL Final    HCT 06/11/2024 40.8  37.5 - 51.0 % Final    MCV 06/11/2024 83  79 - 97 fL Final    MCH 06/11/2024 28.4  26.6 - 33.0 pg Final    MCHC  06/11/2024 34.1  31.5 - 35.7 g/dL Final    RDW 06/11/2024 13.0  11.6 - 15.4 % Final    Platelet Count 06/11/2024 291  150 - 450 x10E3/uL Final    Neutrophils 06/11/2024 66  Not Estab. % Final    Lymphocytes 06/11/2024 23  Not Estab. % Final    Monocytes 06/11/2024 10  Not Estab. % Final    Eosinophils 06/11/2024 0  Not Estab. % Final    Basophils PCT 06/11/2024 1  Not Estab. % Final    Neutrophils (Absolute) 06/11/2024 5.3  1.4 - 7.0 x10E3/uL Final    Lymphocytes (Absolute) 06/11/2024 1.8  0.7 - 3.1 x10E3/uL Final    Monocytes (Absolute) 06/11/2024 0.8  0.1 - 0.9 x10E3/uL Final    Eosinophils (Absolute) 06/11/2024 0.0  0.0 - 0.4 x10E3/uL Final    Basophils ABS 06/11/2024 0.1  0.0 - 0.2 x10E3/uL Final    Immature Granulocytes 06/11/2024 0  Not Estab. % Final    Immature Granulocytes (Absolute) 06/11/2024 0.0  0.0 - 0.1 x10E3/uL Final   Orders Only on 05/30/2024   Component Date Value Ref Range Status    Cholesterol, Total 06/10/2024 203 (H)  100 - 199 mg/dL Final    Triglycerides 06/10/2024 215 (H)  0 - 149 mg/dL Final    HDL 06/10/2024 33 (L)  >39 mg/dL Final    VLDL Cholesterol Calculated 06/10/2024 39  5 - 40 mg/dL Final    LDL Calculated 06/10/2024 131 (H)  0 - 99 mg/dL Final    LDL CALC COMMENT 06/10/2024 CANCELED   Final-Edited    Comment: Test not performed    Result canceled by the ancillary.      LDl/HDL Ratio 06/10/2024 4.0 (H)  0.0 - 3.6 ratio Final    Comment:                                     LDL/HDL Ratio                                              Men  Women                                1/2 Avg.Risk  1.0    1.5                                    Avg.Risk  3.6    3.2                                 2X Avg.Risk  6.2    5.0                                 3X Avg.Risk  8.0    6.1      Hemoglobin A1C 06/10/2024 5.5  4.8 - 5.6 % Final    Comment:          Prediabetes: 5.7 - 6.4           Diabetes: >6.4           Glycemic control for adults with diabetes: <7.0      Estimated Average Glucose 06/10/2024  111  mg/dL Final   Orders Only on 04/22/2024   Component Date Value Ref Range Status    White Blood Cell Count 04/22/2024 6.3  3.4 - 10.8 x10E3/uL Final    Red Blood Cell Count 04/22/2024 4.94  4.14 - 5.80 x10E6/uL Final    Hemoglobin 04/22/2024 14.5  13.0 - 17.7 g/dL Final    HCT 04/22/2024 43.5  37.5 - 51.0 % Final    MCV 04/22/2024 88  79 - 97 fL Final    MCH 04/22/2024 29.4  26.6 - 33.0 pg Final    MCHC 04/22/2024 33.3  31.5 - 35.7 g/dL Final    RDW 04/22/2024 12.6  11.6 - 15.4 % Final    Platelet Count 04/22/2024 291  150 - 450 x10E3/uL Final    Neutrophils 04/22/2024 64  Not Estab. % Final    Lymphocytes 04/22/2024 26  Not Estab. % Final    Monocytes 04/22/2024 9  Not Estab. % Final    Eosinophils 04/22/2024 0  Not Estab. % Final    Basophils PCT 04/22/2024 1  Not Estab. % Final    Neutrophils (Absolute) 04/22/2024 4.0  1.4 - 7.0 x10E3/uL Final    Lymphocytes (Absolute) 04/22/2024 1.7  0.7 - 3.1 x10E3/uL Final    Monocytes (Absolute) 04/22/2024 0.6  0.1 - 0.9 x10E3/uL Final    Eosinophils (Absolute) 04/22/2024 0.0  0.0 - 0.4 x10E3/uL Final    Basophils ABS 04/22/2024 0.1  0.0 - 0.2 x10E3/uL Final    Immature Granulocytes 04/22/2024 0  Not Estab. % Final    Immature Granulocytes (Absolute) 04/22/2024 0.0  0.0 - 0.1 x10E3/uL Final   Orders Only on 04/15/2024   Component Date Value Ref Range Status    White Blood Cell Count 04/15/2024 7.6  3.4 - 10.8 x10E3/uL Final    Red Blood Cell Count 04/15/2024 4.99  4.14 - 5.80 x10E6/uL Final    Hemoglobin 04/15/2024 14.5  13.0 - 17.7 g/dL Final    HCT 04/15/2024 43.3  37.5 - 51.0 % Final    MCV 04/15/2024 87  79 - 97 fL Final    MCH 04/15/2024 29.1  26.6 - 33.0 pg Final    MCHC 04/15/2024 33.5  31.5 - 35.7 g/dL Final    RDW 04/15/2024 12.6  11.6 - 15.4 % Final    Platelet Count 04/15/2024 284  150 - 450 x10E3/uL Final    Neutrophils 04/15/2024 60  Not Estab. % Final    Lymphocytes 04/15/2024 29  Not Estab. % Final    Monocytes 04/15/2024 9  Not Estab. % Final     Eosinophils 04/15/2024 1  Not Estab. % Final    Basophils PCT 04/15/2024 1  Not Estab. % Final    Neutrophils (Absolute) 04/15/2024 4.6  1.4 - 7.0 x10E3/uL Final    Lymphocytes (Absolute) 04/15/2024 2.2  0.7 - 3.1 x10E3/uL Final    Monocytes (Absolute) 04/15/2024 0.7  0.1 - 0.9 x10E3/uL Final    Eosinophils (Absolute) 04/15/2024 0.1  0.0 - 0.4 x10E3/uL Final    Basophils ABS 04/15/2024 0.1  0.0 - 0.2 x10E3/uL Final    Immature Granulocytes 04/15/2024 0  Not Estab. % Final    Immature Granulocytes (Absolute) 04/15/2024 0.0  0.0 - 0.1 x10E3/uL Final   Ancillary Orders on 04/12/2024   Component Date Value Ref Range Status    White Blood Cell Count 04/29/2024 6.3  3.4 - 10.8 x10E3/uL Final    Red Blood Cell Count 04/29/2024 4.99  4.14 - 5.80 x10E6/uL Final    Hemoglobin 04/29/2024 14.8  13.0 - 17.7 g/dL Final    HCT 04/29/2024 42.6  37.5 - 51.0 % Final    MCV 04/29/2024 85  79 - 97 fL Final    MCH 04/29/2024 29.7  26.6 - 33.0 pg Final    MCHC 04/29/2024 34.7  31.5 - 35.7 g/dL Final    RDW 04/29/2024 12.7  11.6 - 15.4 % Final    Platelet Count 04/29/2024 297  150 - 450 x10E3/uL Final    Neutrophils 04/29/2024 62  Not Estab. % Final    Lymphocytes 04/29/2024 27  Not Estab. % Final    Monocytes 04/29/2024 10  Not Estab. % Final    Eosinophils 04/29/2024 0  Not Estab. % Final    Basophils PCT 04/29/2024 1  Not Estab. % Final    Neutrophils (Absolute) 04/29/2024 3.8  1.4 - 7.0 x10E3/uL Final    Lymphocytes (Absolute) 04/29/2024 1.7  0.7 - 3.1 x10E3/uL Final    Monocytes (Absolute) 04/29/2024 0.6  0.1 - 0.9 x10E3/uL Final    Eosinophils (Absolute) 04/29/2024 0.0  0.0 - 0.4 x10E3/uL Final    Basophils ABS 04/29/2024 0.1  0.0 - 0.2 x10E3/uL Final    Immature Granulocytes 04/29/2024 0  Not Estab. % Final    Immature Granulocytes (Absolute) 04/29/2024 0.0  0.0 - 0.1 x10E3/uL Final   Orders Only on 04/08/2024   Component Date Value Ref Range Status    White Blood Cell Count 04/08/2024 6.9  3.4 - 10.8 x10E3/uL Final    Red Blood  Cell Count 04/08/2024 5.06  4.14 - 5.80 x10E6/uL Final    Hemoglobin 04/08/2024 14.8  13.0 - 17.7 g/dL Final    HCT 04/08/2024 42.4  37.5 - 51.0 % Final    MCV 04/08/2024 84  79 - 97 fL Final    MCH 04/08/2024 29.2  26.6 - 33.0 pg Final    MCHC 04/08/2024 34.9  31.5 - 35.7 g/dL Final    RDW 04/08/2024 12.6  11.6 - 15.4 % Final    Platelet Count 04/08/2024 283  150 - 450 x10E3/uL Final    Neutrophils 04/08/2024 62  Not Estab. % Final    Lymphocytes 04/08/2024 26  Not Estab. % Final    Monocytes 04/08/2024 10  Not Estab. % Final    Eosinophils 04/08/2024 1  Not Estab. % Final    Basophils PCT 04/08/2024 1  Not Estab. % Final    Neutrophils (Absolute) 04/08/2024 4.3  1.4 - 7.0 x10E3/uL Final    Lymphocytes (Absolute) 04/08/2024 1.8  0.7 - 3.1 x10E3/uL Final    Monocytes (Absolute) 04/08/2024 0.7  0.1 - 0.9 x10E3/uL Final    Eosinophils (Absolute) 04/08/2024 0.0  0.0 - 0.4 x10E3/uL Final    Basophils ABS 04/08/2024 0.1  0.0 - 0.2 x10E3/uL Final    Immature Granulocytes 04/08/2024 0  Not Estab. % Final    Immature Granulocytes (Absolute) 04/08/2024 0.0  0.0 - 0.1 x10E3/uL Final   Ancillary Orders on 03/29/2024   Component Date Value Ref Range Status    White Blood Cell Count 04/01/2024 7.1  3.4 - 10.8 x10E3/uL Final    Red Blood Cell Count 04/01/2024 4.96  4.14 - 5.80 x10E6/uL Final    Hemoglobin 04/01/2024 14.3  13.0 - 17.7 g/dL Final    HCT 04/01/2024 42.6  37.5 - 51.0 % Final    MCV 04/01/2024 86  79 - 97 fL Final    MCH 04/01/2024 28.8  26.6 - 33.0 pg Final    MCHC 04/01/2024 33.6  31.5 - 35.7 g/dL Final    RDW 04/01/2024 12.8  11.6 - 15.4 % Final    Platelet Count 04/01/2024 292  150 - 450 x10E3/uL Final    Neutrophils 04/01/2024 59  Not Estab. % Final    Lymphocytes 04/01/2024 29  Not Estab. % Final    Monocytes 04/01/2024 10  Not Estab. % Final    Eosinophils 04/01/2024 1  Not Estab. % Final    Basophils PCT 04/01/2024 1  Not Estab. % Final    Neutrophils (Absolute) 04/01/2024 4.2  1.4 - 7.0 x10E3/uL Final     "Lymphocytes (Absolute) 04/01/2024 2.1  0.7 - 3.1 x10E3/uL Final    Monocytes (Absolute) 04/01/2024 0.7  0.1 - 0.9 x10E3/uL Final    Eosinophils (Absolute) 04/01/2024 0.1  0.0 - 0.4 x10E3/uL Final    Basophils ABS 04/01/2024 0.1  0.0 - 0.2 x10E3/uL Final    Immature Granulocytes 04/01/2024 0  Not Estab. % Final    Immature Granulocytes (Absolute) 04/01/2024 0.0  0.0 - 0.1 x10E3/uL Final   There may be more visits with results that are not included.             ___________________________________    History Review: The following portions of the patient's history were reviewed and updated as appropriate: allergies, current medications, past family history, past medical history, past social history, past surgical history, and problem list.    Unchanged information from this writer's previous assessment is copied and italicized; information that has changed is bolded.    Past Psychiatric History:      Past psychiatric diagnoses:   Schizophrenia, schizoaffective d/o, anxiety, depression   Inpatient psychiatric admissions:   10 times total (4 were involuntary)  First - age 22 after stealing sisters klonopin  Most common reason: first few were \"no reason\"  2017 - got invega shot was a very influential moment  2 suicide attempts/SI: SI and wanting to jump off a roof (2021) a few months later after sleeping pills, tried to hang himself 2 years ago  Psychosis 3 times (most recent for that reason was 3 years)  Most recent was 2 years ago  Has stayed out of the hospital due to \"taking his medications, not having suicidal ideation\"  Denies history of ECT, denies having had ICM in past.   Prior outpatient psychiatric treatment:   Saw Chilo Ramsey for 3 years  Saw psychiatrist at [facility] for a year before that  Past/current psychotherapy:   Worked with a therapist at [facility] Masood, stopped seeing 1-2 years ago because he left the practice  History of suicidal attempts/gestures:   2: took sleeping pills (August 2021), tried to hang self " "two years  History of non-suicidal self-injurious behavior:   None  History of violence/aggressive behaviors:   None  Psychotropic medication trials:   Invega, (discomfort) risperdal, zyprexa, thorazine (all unknown reasons for discontinuation)  Abilify (bad reaction)  Invega SCHAEFER x1 in 2019 (\"hurt his head\")  Ativan ('caused psychosis')  Loxapine - feeling too tired on dosages above 20mg  Risperdal - above 2mg causes nausea and \"hurting\" in his head  Clozapine - current medication  Melatonin - current medication  Substance abuse inpatient/outpatient rehabilitation:   Rehab in 2017 - marijuana use  Eating disorder history:   No  Other services: used to have a  but reports he does not anymore because they ran out of things to do together      Substance Abuse History:     States he was smoking a lot of marijuana for 5 years, nearly every day, stopped smoking 2019 July after he \"freaked out\" and thought \"there were people in his basement and living in his house and torturing him. \"     Denies any other recreational drug use and otherwise denies history of alcohol, illict substance, or tobacco abuse., Patient denies previous legal actions or arrests related to substance intoxication including prior DWIs/DUIs., Patient does not exhibit objective evidence of substance withdrawal during today's examination nor do they appear under the influence of any psychoactive substance.       Family Psychiatric History:      Family History   History reviewed. No pertinent family history.         Psychiatric Family History: Per patient he is adopted,  Per mother is not adopted. Maternal grandfather - schizophrenia  Tex's half sister has bipolar disorder     Social History:     Developmental: Was in special education, denies IEP  Per care everywhere, mild developmental delay, early intervention at age 2.5. Concern for autism, diagnosed with ADHD.  Has 2 sister (50 and 34 years old), states he was adopted but his mother " "reports he is not adopted  Education: high school diploma/GED  Marital history: single  Children: none  Living arrangement, social support: Family is supportive, mom is supportive. Lives with mother, on a waiting list for housing, on disability for schizoaffective disorder.   Dad lives in [place]   Occupational History: Permanent disability. 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 , Patient denies history of arrests or violence with a deadly weapon.    history: None     Traumatic History:      Abuse: Denies  Other Traumatic Events: Bullying, and would not disclose further.  ___________________________________      Visit Time    Visit Start Time: 2:25  Visit Stop Time: 2:50  Total Visit Duration:  25 minutes    Lauren Stockton MD   07/24/24    "

## 2024-07-24 ENCOUNTER — TELEPHONE (OUTPATIENT)
Age: 29
End: 2024-07-24

## 2024-07-24 ENCOUNTER — OFFICE VISIT (OUTPATIENT)
Dept: PSYCHIATRY | Facility: CLINIC | Age: 29
End: 2024-07-24

## 2024-07-24 VITALS — SYSTOLIC BLOOD PRESSURE: 118 MMHG | WEIGHT: 198.6 LBS | DIASTOLIC BLOOD PRESSURE: 95 MMHG

## 2024-07-24 DIAGNOSIS — Z13.0 SCREENING FOR ENDOCRINE, NUTRITIONAL, METABOLIC AND IMMUNITY DISORDER: ICD-10-CM

## 2024-07-24 DIAGNOSIS — Z13.228 SCREENING FOR ENDOCRINE, NUTRITIONAL, METABOLIC AND IMMUNITY DISORDER: ICD-10-CM

## 2024-07-24 DIAGNOSIS — Z13.21 SCREENING FOR ENDOCRINE, NUTRITIONAL, METABOLIC AND IMMUNITY DISORDER: ICD-10-CM

## 2024-07-24 DIAGNOSIS — R45.84 ANHEDONIA: ICD-10-CM

## 2024-07-24 DIAGNOSIS — F20.9 SCHIZOPHRENIA, UNSPECIFIED TYPE (HCC): Primary | ICD-10-CM

## 2024-07-24 DIAGNOSIS — Z13.29 SCREENING FOR ENDOCRINE, NUTRITIONAL, METABOLIC AND IMMUNITY DISORDER: ICD-10-CM

## 2024-07-24 DIAGNOSIS — E55.9 VITAMIN D DEFICIENCY, UNSPECIFIED: ICD-10-CM

## 2024-07-24 DIAGNOSIS — E78.00 HIGH CHOLESTEROL: ICD-10-CM

## 2024-07-24 NOTE — TELEPHONE ENCOUNTER
Patients mother requested the new scripts for lab work for the patient be printed out so she can pick them up. They do not have a printer to  do so. She will be stopping by on 7/25/2024 to pick them up.

## 2024-08-01 ENCOUNTER — SOCIAL WORK (OUTPATIENT)
Dept: BEHAVIORAL/MENTAL HEALTH CLINIC | Facility: CLINIC | Age: 29
End: 2024-08-01
Payer: COMMERCIAL

## 2024-08-01 DIAGNOSIS — F12.11 CANNABIS ABUSE, IN REMISSION: ICD-10-CM

## 2024-08-01 DIAGNOSIS — F20.9 SCHIZOPHRENIA, UNSPECIFIED TYPE (HCC): Primary | ICD-10-CM

## 2024-08-01 PROCEDURE — 90837 PSYTX W PT 60 MINUTES: CPT | Performed by: COUNSELOR

## 2024-08-06 LAB
25(OH)D3+25(OH)D2 SERPL-MCNC: 17 NG/ML (ref 30–100)
ALBUMIN SERPL-MCNC: 4.5 G/DL (ref 4.3–5.2)
ALP SERPL-CCNC: 119 IU/L (ref 44–121)
ALT SERPL-CCNC: 35 IU/L (ref 0–44)
AST SERPL-CCNC: 26 IU/L (ref 0–40)
BILIRUB SERPL-MCNC: 0.5 MG/DL (ref 0–1.2)
BUN SERPL-MCNC: 11 MG/DL (ref 6–20)
BUN/CREAT SERPL: 11 (ref 9–20)
CALCIUM SERPL-MCNC: 9.5 MG/DL (ref 8.7–10.2)
CHLORIDE SERPL-SCNC: 101 MMOL/L (ref 96–106)
CO2 SERPL-SCNC: 23 MMOL/L (ref 20–29)
CREAT SERPL-MCNC: 0.98 MG/DL (ref 0.76–1.27)
EGFR: 108 ML/MIN/1.73
GLOBULIN SER-MCNC: 2.5 G/DL (ref 1.5–4.5)
GLUCOSE SERPL-MCNC: 88 MG/DL (ref 70–99)
POTASSIUM SERPL-SCNC: 4.4 MMOL/L (ref 3.5–5.2)
PROT SERPL-MCNC: 7 G/DL (ref 6–8.5)
SODIUM SERPL-SCNC: 140 MMOL/L (ref 134–144)
TSH SERPL DL<=0.005 MIU/L-ACNC: 2.32 UIU/ML (ref 0.45–4.5)
VIT B12 SERPL-MCNC: 442 PG/ML (ref 232–1245)

## 2024-08-06 NOTE — PSYCH
"Behavioral Health Psychotherapy Progress Note    Psychotherapy Provided: Individual Psychotherapy     1. Schizophrenia, unspecified type (HCC)        2. Cannabis abuse, in remission            Goals addressed in session: Goal 1     DATA: Sudhakar processed his feelings around friends who mistreated him. He said that he used to have chronic SI but not any longer. Sudhakar continues to talk about a cousin who was  a famous artist and trauma around belief that others wanted to kill him by throwing him off the roof out of jealousy. We discussed ways to deal with high stress and feel safe in his environment. His parents remain supportive. Sudhakar has craving to use but says he would not because it leads to psychotic symptoms and hospitalization. Clinician provided validation and support.  During this session, this clinician used the following therapeutic modalities: Client-centered Therapy    Substance Abuse was addressed during this session. If the client is diagnosed with a co-occurring substance use disorder, please indicate any changes in the frequency or amount of use: none. Stage of change for addressing substance use diagnoses: Maintenance    ASSESSMENT:  Tex Hill presents with a Dysthymic mood.     his affect is Blunted, which is congruent, with his mood and the content of the session. The client has made progress on their goals.     Tex Hill presents with a minimal risk of suicide, minimal risk of self-harm, and minimal risk of harm to others.    For any risk assessment that surpasses a \"low\" rating, a safety plan must be developed.    A safety plan was indicated: no  If yes, describe in detail N/A    PLAN: Between sessions, Tex Hill will use his supports and coping skills. At the next session, the therapist will use Cognitive Behavioral Therapy to address anxiety.    Behavioral Health Treatment Plan and Discharge Planning: Tex Hill is aware of and agrees to continue to work on their treatment " plan. They have identified and are working toward their discharge goals. yes    Visit start and stop times:    08/06/24  Start Time: 1500  Stop Time: 1555  Total Visit Time: 55 minutes

## 2024-08-12 ENCOUNTER — OFFICE VISIT (OUTPATIENT)
Dept: PSYCHIATRY | Facility: CLINIC | Age: 29
End: 2024-08-12

## 2024-08-12 DIAGNOSIS — F20.9 SCHIZOPHRENIA, UNSPECIFIED TYPE (HCC): Primary | ICD-10-CM

## 2024-08-12 DIAGNOSIS — E55.9 VITAMIN D DEFICIENCY: ICD-10-CM

## 2024-08-12 PROCEDURE — NC001 PR NO CHARGE: Performed by: PSYCHIATRY & NEUROLOGY

## 2024-08-12 RX ORDER — CHOLECALCIFEROL (VITAMIN D3) 50 MCG
4000 TABLET ORAL DAILY
Qty: 60 TABLET | Refills: 2 | Status: SHIPPED | OUTPATIENT
Start: 2024-08-12

## 2024-08-12 NOTE — PSYCH
"MEDICATION MANAGEMENT NOTE      [facility]: [facility], [place]   Mental Health Outpatient Services   [address] [place] [id number] [phone]    Name and Date of Birth:  Tex Hill 29 y.o. 1995 MRN: [id number]    Date of Visit: August 12, 2024    Reason for Visit: Follow-up visit regarding medication management     _____________________________    Assessment & Plan   Tex Hill is a 29 y.o. male, Single,  domiciled with mother, on permanent disability, with past medical history of Vitamin D deficiency, prior psychiatric diagnoses of schizoaffective disorder; multiple hospitalizations and two suicide attempts (most recently 2021).  Tex was personally seen and evaluated today at the [facility] outpatient clinic for follow-up regarding medication management.        On assessment, Tex Hill reports things have been stable in terms of his mood and mental health. He is adherent with his medication regimen. He feels this is \"as good as it's gonna get\" in terms of his mental health, and is resistant to discussion that he has the potential to improve his life even further. Tex is able to notice a significant improvement in his life in the past year and realizes the benefit of his current medication regimen. He denies side effects. He continues to endorse sexual dysfunction and perseverates on the invega injection. Tried to discuss the possibility and importance of moving through that impactful event in his life. We discussed previous referrals and recommendations including nutritionist, psychological testing for autism, and MRI, as well as referral for more /clubhouse. Tex is somewhat open to trying some of these interventions. He is open to seeing a nutritionist and for psychological testing, and now is open to hearing more about clubhouse. He states he will consider getting the MRI. We will continue to target these areas " of intervention to improve his life and wellness. We will continue his current medication dosages with the exception of increasing supplemental vitamin D. Patient is in agreement with the treatment plan as detailed below, and agrees to call the office with any concerns or side effects between appointments.     DSM-5 Diagnoses/Visit Diagnoses:     1. Schizophrenia, unspecified type (HCC)    2. Vitamin D deficiency      Treatment Recommendations/Precautions:  Continue psychopharmacological management as follows:  Continue risperdal 2mg HS for psychosis  Clozapine 75mg HS, for psychosis  CBC with differential every other week per [facility] program   Clozapine level = 70 (low) on 12/11 on 100mg dosage  AIMS = 0 on 5/1/24  Continue vitamin D supplementation for deficiency (increasing today to 4000 IU daily due to low level)  Fish oil supplementation for brain health and for high triglycerides   Continue daily sennakot for clozapine induced constipation  Melatonin 1mg HS PRN for sleep-wake cycle regulation  Labs most recently obtained  every other week, reviewed. Also reviewed Vitamin B12, D, and TSH and CMP today  Follow up in 6 weeks for medication management  Referred previously for nutrition services due to medication induced weight gain and hyperlipidemia  Referred previously for psychological/neuropsychological testing for autism evaluation  Reached out to  navigator about [facility] referral  Referred for MRI and encouraged this to be completed for psychosis workup and head pain/pressure symptoms - pt declining this, still discussing with him at each visit to encourage him to complete this  Continue individual psychotherapy with Eleni Bowers  Follow up with PCP for medical issues and ongoing care  Aware of 24 hour and weekend coverage for urgent situations accessed by calling [facility] Outpatient main practice number    Individual psychotherapy provided: No     Treatment Plan:  "    Completed and signed during the session: Not applicable - Treatment Plan not due at this session    Medications Risks/Benefits:      Risks, Benefits And Possible Side Effects Of Medications:    Risks, benefits, and possible side effects of medications explained to Tex and he verbalizes understanding and agreement for treatment.     Controlled Medication Discussion:     Not applicable    Medical Decision Making / Counseling / Coordination of Care:  The following interventions are recommended: return in 6 weeks for follow up.  Although patient's acute lethality risk is LOW, long-term/chronic lethality risk is mildly elevated given the risk factors listed above. However, at the current moment, Tex is future-oriented, forward-thinking, and demonstrates ability to act in a self-preserving manner as evidenced by volitionally seeking psychiatric evaluation and treatment today. To mitigate future risk, patient should adhere to treatment recommendations, avoid alcohol/illicit substance use, utilize community-based resources and familiar support, and prioritize mental health treatment. The diagnosis and treatment plan were reviewed with the patient. Risks, benefits, and alternatives to treatment were discussed. The importance of medication and treatment compliance was reviewed with the patient.     _____________________________________________    History of Present Illness     Chief Complaint: \"I'm fine\"    SUBJECTIVE:    Tex Hill is a 29 y.o. male, possessing a past psychiatric history significant for schizophrenia, who was personally seen and evaluated at the [facility] outpatient clinic  for follow-up regarding medication management.  At their last visit, the plan was to start fish oil supplementation and obtain blood work and complete YBOCs.    Tex states that since their previous psychiatric appointment with this writer, he has been stable overall. He reports feeling \"fine\" and that the " "medication is helpful. He reports adherence and denies side effects - has regular bowel movements and has been keeping up with required blood work. He denies depression or anxiety, and denies thoughts to harm himself. He feels the medication is helpful for his mental health and is reflecting on the progress he has made in the last year. However he feels this \"is as good as it's gonna get\" in terms of his mental health.     He did not complete YBOC form and did not bring this. He states he is not interested at this time in completing this. He continues to discuss past delusions involving rapper and people targeting him in the past, but reports this was \"exaggerated due to his psychosis\". We discussed his insight in this. He denies hearing voices but he states he will talk to himself if he consumes too much caffeine. He has not yet met with nutrition services. He continues to decline MRI but seemed more open to this today when we discussed it could potentially provide more information about why he is having sexual dysfunction, which has been ongoing for years. He continues to reference a facebook group for individuals who received invega injection.     Presently, patient denies suicidal/homicidal ideation in addition to thoughts of self-injury.  At conclusion of evaluation, patient is amenable to the recommendations of this writer including: continue psychotropic medications as prescribed, continue therapy.  Also, patient is amenable to calling/contacting the outpatient office including this writer if any acute adverse effects of their medication regimen arise in addition to any comments or concerns pertaining to their psychiatric management.  Patient is amenable to calling/contacting crisis and/or attending to the nearest emergency department if their clinical condition deteriorates to assure their safety and stability, stating that they are able to appropriately confide in their mother regarding their psychiatric " "state.    Current Rating Scores:     Current PHQ-9   PHQ-2/9 Depression Screening           Current JESSE-7 is .    Psychiatric Review Of Systems:  Unchanged information from this writer's previous assessment is copied and italicized; information that has changed is bolded.      Appetite: denies  Adverse eating: no  Weight changes: denies  Insomnia/sleeplessness: sleeping well   Fatigue/anergy: denies  Anhedonia/lack of interest: unchanged  Attention/concentration: denies   Psychomotor agitation/retardation: no  Somatic symptoms: no  Anxiety/panic attack: denies  Mylene/hypomania: no  Hopelessness/helplessness/worthlessness: no  Self-injurious behavior/high-risk behavior: no  Suicidal ideation: no  Homicidal ideation: no  Auditory hallucinations: denies  Visual hallucinations: no  Other perceptual disturbances: no  Delusional thinking: no  Obsessive/compulsive symptoms: no    Review Of Systems:      Constitutional negative   ENT negative   Cardiovascular negative   Respiratory negative   Gastrointestinal negative   Genitourinary negative   Musculoskeletal negative   Integumentary negative   Neurological negative   Endocrine negative   Other Symptoms none, all other systems are negative     Objective    OBJECTIVE:     Visit Vitals  Smoking Status Never      Wt Readings from Last 6 Encounters:   07/24/24 90.1 kg (198 lb 9.6 oz)        History reviewed. No pertinent past medical history.   History reviewed. No pertinent surgical history.      Mental Status Exam:    Appearance age appropriate, casually dressed, seated comfortably, intermittent eye contact   Behavior cooperative, calm   Speech normal rate, normal volume, normal pitch   Mood \"Fine\"   Affect blunted   Thought Processes goal directed   Associations concrete associations   Thought Content grandiose ideas at times in regards to past delusions, but able to challenge these   Perceptual Disturbances: Denies auditory or visual hallucinations and Does not appear to " be responding to internal stimuli   Abnormal Thoughts  Risk Potential Denies suicidal or homicidal ideation, plan, or intent   Orientation oriented to person, place, time/date, and situation   Memory recent and remote memory grossly intact   Consciousness alert and awake   Attention Span Concentration Span attention span and concentration are age appropriate   Intellect appears to be of average intelligence   Insight fair   Judgement fair   Muscle Strength and  Gait normal muscle strength and normal muscle tone, normal gait and normal balance   Motor Activity no abnormal movements   Language no difficulty naming common objects, no difficulty repeating a phrase, no difficulty writing a sentence   Fund of Knowledge adequate knowledge of current events  adequate fund of knowledge regarding past history  adequate fund of knowledge regarding vocabulary        Meds/Allergies    Allergies   Allergen Reactions    Gantrisin [Sulfisoxazole] Hallucinations     Took as a child and had hallucinations per mother     Current Outpatient Medications   Medication Instructions    b complex vitamins capsule 1 capsule, Oral, Daily    clozapine (CLOZARIL) 75 mg, Oral, Daily at bedtime    Fish Oil Burp-Less 1,000 mg, Oral, Daily    melatonin 1 mg, Oral, Daily at bedtime PRN    risperiDONE (RISPERDAL) 2 mg, Oral, Daily at bedtime    senna (SENOKOT) 8.6 mg, Oral, Daily at bedtime    Vitamin D3 4,000 Units, Oral, Daily         Laboratory Results: I have personally reviewed all pertinent laboratory/tests results    Ancillary Orders on 08/02/2024   Component Date Value Ref Range Status    White Blood Cell Count 08/05/2024 6.8  3.4 - 10.8 x10E3/uL Final    Red Blood Cell Count 08/05/2024 5.15  4.14 - 5.80 x10E6/uL Final    Hemoglobin 08/05/2024 14.8  13.0 - 17.7 g/dL Final    HCT 08/05/2024 44.3  37.5 - 51.0 % Final    MCV 08/05/2024 86  79 - 97 fL Final    MCH 08/05/2024 28.7  26.6 - 33.0 pg Final    MCHC 08/05/2024 33.4  31.5 - 35.7 g/dL  Final    RDW 08/05/2024 13.2  11.6 - 15.4 % Final    Platelet Count 08/05/2024 271  150 - 450 x10E3/uL Final    Neutrophils 08/05/2024 61  Not Estab. % Final    Lymphocytes 08/05/2024 27  Not Estab. % Final    Monocytes 08/05/2024 10  Not Estab. % Final    Eosinophils 08/05/2024 0  Not Estab. % Final    Basophils PCT 08/05/2024 1  Not Estab. % Final    Neutrophils (Absolute) 08/05/2024 4.1  1.4 - 7.0 x10E3/uL Final    Lymphocytes (Absolute) 08/05/2024 1.8  0.7 - 3.1 x10E3/uL Final    Monocytes (Absolute) 08/05/2024 0.7  0.1 - 0.9 x10E3/uL Final    Eosinophils (Absolute) 08/05/2024 0.0  0.0 - 0.4 x10E3/uL Final    Basophils ABS 08/05/2024 0.1  0.0 - 0.2 x10E3/uL Final    Immature Granulocytes 08/05/2024 1  Not Estab. % Final    Immature Granulocytes (Absolute) 08/05/2024 0.0  0.0 - 0.1 x10E3/uL Final   Office Visit on 07/24/2024   Component Date Value Ref Range Status    TSH 08/05/2024 2.320  0.450 - 4.500 uIU/mL Final    Glucose, Random 08/05/2024 88  70 - 99 mg/dL Final    BUN 08/05/2024 11  6 - 20 mg/dL Final    Creatinine 08/05/2024 0.98  0.76 - 1.27 mg/dL Final    eGFR 08/05/2024 108  >59 mL/min/1.73 Final    SL AMB BUN/CREATININE RATIO 08/05/2024 11  9 - 20 Final    Sodium 08/05/2024 140  134 - 144 mmol/L Final    Potassium 08/05/2024 4.4  3.5 - 5.2 mmol/L Final    Chloride 08/05/2024 101  96 - 106 mmol/L Final    CO2 08/05/2024 23  20 - 29 mmol/L Final    CALCIUM 08/05/2024 9.5  8.7 - 10.2 mg/dL Final    Protein, Total 08/05/2024 7.0  6.0 - 8.5 g/dL Final    Albumin 08/05/2024 4.5  4.3 - 5.2 g/dL Final    Globulin, Total 08/05/2024 2.5  1.5 - 4.5 g/dL Final    TOTAL BILIRUBIN 08/05/2024 0.5  0.0 - 1.2 mg/dL Final    Alk Phos Isoenzymes 08/05/2024 119  44 - 121 IU/L Final    AST 08/05/2024 26  0 - 40 IU/L Final    ALT 08/05/2024 35  0 - 44 IU/L Final    25-HYDROXY VIT D 08/05/2024 17.0 (L)  30.0 - 100.0 ng/mL Final    Comment: Vitamin D deficiency has been defined by the [place] of  Medicine and an Endocrine  Society practice guideline as a  level of serum 25-OH vitamin D less than 20 ng/mL (1,2).  The Endocrine Society went on to further define vitamin D  insufficiency as a level between 21 and 29 ng/mL (2).  1. IOM ([place] of Medicine). 2010. Dietary reference     intakes for calcium and D. [place]: The     National Academies Press.  2. Iwona MF, Yaritza STERN, Chandan HERNANDEZ, et al.     Evaluation, treatment, and prevention of vitamin D     deficiency: an Endocrine Society clinical practice     guideline. JCEM. 2011 Jul; 96(3):1911-30.      Vitamin B-12 08/05/2024 442  232 - 1,245 pg/mL Final   Ancillary Orders on 07/19/2024   Component Date Value Ref Range Status    White Blood Cell Count 07/22/2024 6.2  3.4 - 10.8 x10E3/uL Final    Red Blood Cell Count 07/22/2024 4.89  4.14 - 5.80 x10E6/uL Final    Hemoglobin 07/22/2024 14.2  13.0 - 17.7 g/dL Final    HCT 07/22/2024 41.1  37.5 - 51.0 % Final    MCV 07/22/2024 84  79 - 97 fL Final    MCH 07/22/2024 29.0  26.6 - 33.0 pg Final    MCHC 07/22/2024 34.5  31.5 - 35.7 g/dL Final    RDW 07/22/2024 13.0  11.6 - 15.4 % Final    Platelet Count 07/22/2024 275  150 - 450 x10E3/uL Final    Neutrophils 07/22/2024 65  Not Estab. % Final    Lymphocytes 07/22/2024 26  Not Estab. % Final    Monocytes 07/22/2024 8  Not Estab. % Final    Eosinophils 07/22/2024 0  Not Estab. % Final    Basophils PCT 07/22/2024 1  Not Estab. % Final    Neutrophils (Absolute) 07/22/2024 4.1  1.4 - 7.0 x10E3/uL Final    Lymphocytes (Absolute) 07/22/2024 1.6  0.7 - 3.1 x10E3/uL Final    Monocytes (Absolute) 07/22/2024 0.5  0.1 - 0.9 x10E3/uL Final    Eosinophils (Absolute) 07/22/2024 0.0  0.0 - 0.4 x10E3/uL Final    Basophils ABS 07/22/2024 0.1  0.0 - 0.2 x10E3/uL Final    Immature Granulocytes 07/22/2024 0  Not Estab. % Final    Immature Granulocytes (Absolute) 07/22/2024 0.0  0.0 - 0.1 x10E3/uL Final   Ancillary Orders on 07/05/2024   Component Date Value Ref Range Status    White Blood Cell  Count 07/08/2024 6.2  3.4 - 10.8 x10E3/uL Final    Red Blood Cell Count 07/08/2024 4.97  4.14 - 5.80 x10E6/uL Final    Hemoglobin 07/08/2024 14.7  13.0 - 17.7 g/dL Final    HCT 07/08/2024 42.4  37.5 - 51.0 % Final    MCV 07/08/2024 85  79 - 97 fL Final    MCH 07/08/2024 29.6  26.6 - 33.0 pg Final    MCHC 07/08/2024 34.7  31.5 - 35.7 g/dL Final    RDW 07/08/2024 13.4  11.6 - 15.4 % Final    Platelet Count 07/08/2024 272  150 - 450 x10E3/uL Final    Neutrophils 07/08/2024 60  Not Estab. % Final    Lymphocytes 07/08/2024 30  Not Estab. % Final    Monocytes 07/08/2024 9  Not Estab. % Final    Eosinophils 07/08/2024 0  Not Estab. % Final    Basophils PCT 07/08/2024 1  Not Estab. % Final    Neutrophils (Absolute) 07/08/2024 3.7  1.4 - 7.0 x10E3/uL Final    Lymphocytes (Absolute) 07/08/2024 1.8  0.7 - 3.1 x10E3/uL Final    Monocytes (Absolute) 07/08/2024 0.6  0.1 - 0.9 x10E3/uL Final    Eosinophils (Absolute) 07/08/2024 0.0  0.0 - 0.4 x10E3/uL Final    Basophils ABS 07/08/2024 0.1  0.0 - 0.2 x10E3/uL Final    Immature Granulocytes 07/08/2024 0  Not Estab. % Final    Immature Granulocytes (Absolute) 07/08/2024 0.0  0.0 - 0.1 x10E3/uL Final   Ancillary Orders on 06/21/2024   Component Date Value Ref Range Status    White Blood Cell Count 06/24/2024 6.5  3.4 - 10.8 x10E3/uL Final    Red Blood Cell Count 06/24/2024 4.84  4.14 - 5.80 x10E6/uL Final    Hemoglobin 06/24/2024 14.0  13.0 - 17.7 g/dL Final    HCT 06/24/2024 40.9  37.5 - 51.0 % Final    MCV 06/24/2024 85  79 - 97 fL Final    MCH 06/24/2024 28.9  26.6 - 33.0 pg Final    MCHC 06/24/2024 34.2  31.5 - 35.7 g/dL Final    RDW 06/24/2024 13.1  11.6 - 15.4 % Final    Platelet Count 06/24/2024 270  150 - 450 x10E3/uL Final    Neutrophils 06/24/2024 59  Not Estab. % Final    Lymphocytes 06/24/2024 29  Not Estab. % Final    Monocytes 06/24/2024 10  Not Estab. % Final    Eosinophils 06/24/2024 0  Not Estab. % Final    Basophils PCT 06/24/2024 1  Not Estab. % Final    Neutrophils  (Absolute) 06/24/2024 3.9  1.4 - 7.0 x10E3/uL Final    Lymphocytes (Absolute) 06/24/2024 1.9  0.7 - 3.1 x10E3/uL Final    Monocytes (Absolute) 06/24/2024 0.6  0.1 - 0.9 x10E3/uL Final    Eosinophils (Absolute) 06/24/2024 0.0  0.0 - 0.4 x10E3/uL Final    Basophils ABS 06/24/2024 0.1  0.0 - 0.2 x10E3/uL Final    Immature Granulocytes 06/24/2024 1  Not Estab. % Final    Immature Granulocytes (Absolute) 06/24/2024 0.0  0.0 - 0.1 x10E3/uL Final   Ancillary Orders on 06/07/2024   Component Date Value Ref Range Status    White Blood Cell Count 06/11/2024 8.0  3.4 - 10.8 x10E3/uL Final    Red Blood Cell Count 06/11/2024 4.89  4.14 - 5.80 x10E6/uL Final    Hemoglobin 06/11/2024 13.9  13.0 - 17.7 g/dL Final    HCT 06/11/2024 40.8  37.5 - 51.0 % Final    MCV 06/11/2024 83  79 - 97 fL Final    MCH 06/11/2024 28.4  26.6 - 33.0 pg Final    MCHC 06/11/2024 34.1  31.5 - 35.7 g/dL Final    RDW 06/11/2024 13.0  11.6 - 15.4 % Final    Platelet Count 06/11/2024 291  150 - 450 x10E3/uL Final    Neutrophils 06/11/2024 66  Not Estab. % Final    Lymphocytes 06/11/2024 23  Not Estab. % Final    Monocytes 06/11/2024 10  Not Estab. % Final    Eosinophils 06/11/2024 0  Not Estab. % Final    Basophils PCT 06/11/2024 1  Not Estab. % Final    Neutrophils (Absolute) 06/11/2024 5.3  1.4 - 7.0 x10E3/uL Final    Lymphocytes (Absolute) 06/11/2024 1.8  0.7 - 3.1 x10E3/uL Final    Monocytes (Absolute) 06/11/2024 0.8  0.1 - 0.9 x10E3/uL Final    Eosinophils (Absolute) 06/11/2024 0.0  0.0 - 0.4 x10E3/uL Final    Basophils ABS 06/11/2024 0.1  0.0 - 0.2 x10E3/uL Final    Immature Granulocytes 06/11/2024 0  Not Estab. % Final    Immature Granulocytes (Absolute) 06/11/2024 0.0  0.0 - 0.1 x10E3/uL Final   Orders Only on 05/30/2024   Component Date Value Ref Range Status    Cholesterol, Total 06/10/2024 203 (H)  100 - 199 mg/dL Final    Triglycerides 06/10/2024 215 (H)  0 - 149 mg/dL Final    HDL 06/10/2024 33 (L)  >39 mg/dL Final    VLDL Cholesterol Calculated  06/10/2024 39  5 - 40 mg/dL Final    LDL Calculated 06/10/2024 131 (H)  0 - 99 mg/dL Final    LDL CALC COMMENT 06/10/2024 CANCELED   Final-Edited    Comment: Test not performed    Result canceled by the ancillary.      LDl/HDL Ratio 06/10/2024 4.0 (H)  0.0 - 3.6 ratio Final    Comment:                                     LDL/HDL Ratio                                              Men  Women                                1/2 Avg.Risk  1.0    1.5                                    Avg.Risk  3.6    3.2                                 2X Avg.Risk  6.2    5.0                                 3X Avg.Risk  8.0    6.1      Hemoglobin A1C 06/10/2024 5.5  4.8 - 5.6 % Final    Comment:          Prediabetes: 5.7 - 6.4           Diabetes: >6.4           Glycemic control for adults with diabetes: <7.0      Estimated Average Glucose 06/10/2024 111  mg/dL Final   Orders Only on 04/22/2024   Component Date Value Ref Range Status    White Blood Cell Count 04/22/2024 6.3  3.4 - 10.8 x10E3/uL Final    Red Blood Cell Count 04/22/2024 4.94  4.14 - 5.80 x10E6/uL Final    Hemoglobin 04/22/2024 14.5  13.0 - 17.7 g/dL Final    HCT 04/22/2024 43.5  37.5 - 51.0 % Final    MCV 04/22/2024 88  79 - 97 fL Final    MCH 04/22/2024 29.4  26.6 - 33.0 pg Final    MCHC 04/22/2024 33.3  31.5 - 35.7 g/dL Final    RDW 04/22/2024 12.6  11.6 - 15.4 % Final    Platelet Count 04/22/2024 291  150 - 450 x10E3/uL Final    Neutrophils 04/22/2024 64  Not Estab. % Final    Lymphocytes 04/22/2024 26  Not Estab. % Final    Monocytes 04/22/2024 9  Not Estab. % Final    Eosinophils 04/22/2024 0  Not Estab. % Final    Basophils PCT 04/22/2024 1  Not Estab. % Final    Neutrophils (Absolute) 04/22/2024 4.0  1.4 - 7.0 x10E3/uL Final    Lymphocytes (Absolute) 04/22/2024 1.7  0.7 - 3.1 x10E3/uL Final    Monocytes (Absolute) 04/22/2024 0.6  0.1 - 0.9 x10E3/uL Final    Eosinophils (Absolute) 04/22/2024 0.0  0.0 - 0.4 x10E3/uL Final    Basophils ABS 04/22/2024 0.1  0.0 - 0.2  x10E3/uL Final    Immature Granulocytes 04/22/2024 0  Not Estab. % Final    Immature Granulocytes (Absolute) 04/22/2024 0.0  0.0 - 0.1 x10E3/uL Final   Orders Only on 04/15/2024   Component Date Value Ref Range Status    White Blood Cell Count 04/15/2024 7.6  3.4 - 10.8 x10E3/uL Final    Red Blood Cell Count 04/15/2024 4.99  4.14 - 5.80 x10E6/uL Final    Hemoglobin 04/15/2024 14.5  13.0 - 17.7 g/dL Final    HCT 04/15/2024 43.3  37.5 - 51.0 % Final    MCV 04/15/2024 87  79 - 97 fL Final    MCH 04/15/2024 29.1  26.6 - 33.0 pg Final    MCHC 04/15/2024 33.5  31.5 - 35.7 g/dL Final    RDW 04/15/2024 12.6  11.6 - 15.4 % Final    Platelet Count 04/15/2024 284  150 - 450 x10E3/uL Final    Neutrophils 04/15/2024 60  Not Estab. % Final    Lymphocytes 04/15/2024 29  Not Estab. % Final    Monocytes 04/15/2024 9  Not Estab. % Final    Eosinophils 04/15/2024 1  Not Estab. % Final    Basophils PCT 04/15/2024 1  Not Estab. % Final    Neutrophils (Absolute) 04/15/2024 4.6  1.4 - 7.0 x10E3/uL Final    Lymphocytes (Absolute) 04/15/2024 2.2  0.7 - 3.1 x10E3/uL Final    Monocytes (Absolute) 04/15/2024 0.7  0.1 - 0.9 x10E3/uL Final    Eosinophils (Absolute) 04/15/2024 0.1  0.0 - 0.4 x10E3/uL Final    Basophils ABS 04/15/2024 0.1  0.0 - 0.2 x10E3/uL Final    Immature Granulocytes 04/15/2024 0  Not Estab. % Final    Immature Granulocytes (Absolute) 04/15/2024 0.0  0.0 - 0.1 x10E3/uL Final   Ancillary Orders on 04/12/2024   Component Date Value Ref Range Status    White Blood Cell Count 04/29/2024 6.3  3.4 - 10.8 x10E3/uL Final    Red Blood Cell Count 04/29/2024 4.99  4.14 - 5.80 x10E6/uL Final    Hemoglobin 04/29/2024 14.8  13.0 - 17.7 g/dL Final    HCT 04/29/2024 42.6  37.5 - 51.0 % Final    MCV 04/29/2024 85  79 - 97 fL Final    MCH 04/29/2024 29.7  26.6 - 33.0 pg Final    MCHC 04/29/2024 34.7  31.5 - 35.7 g/dL Final    RDW 04/29/2024 12.7  11.6 - 15.4 % Final    Platelet Count 04/29/2024 297  150 - 450 x10E3/uL Final    Neutrophils  "04/29/2024 62  Not Estab. % Final    Lymphocytes 04/29/2024 27  Not Estab. % Final    Monocytes 04/29/2024 10  Not Estab. % Final    Eosinophils 04/29/2024 0  Not Estab. % Final    Basophils PCT 04/29/2024 1  Not Estab. % Final    Neutrophils (Absolute) 04/29/2024 3.8  1.4 - 7.0 x10E3/uL Final    Lymphocytes (Absolute) 04/29/2024 1.7  0.7 - 3.1 x10E3/uL Final    Monocytes (Absolute) 04/29/2024 0.6  0.1 - 0.9 x10E3/uL Final    Eosinophils (Absolute) 04/29/2024 0.0  0.0 - 0.4 x10E3/uL Final    Basophils ABS 04/29/2024 0.1  0.0 - 0.2 x10E3/uL Final    Immature Granulocytes 04/29/2024 0  Not Estab. % Final    Immature Granulocytes (Absolute) 04/29/2024 0.0  0.0 - 0.1 x10E3/uL Final   There may be more visits with results that are not included.             ___________________________________    History Review: The following portions of the patient's history were reviewed and updated as appropriate: allergies, current medications, past family history, past medical history, past social history, past surgical history, and problem list.    Unchanged information from this writer's previous assessment is copied and italicized; information that has changed is bolded.      Past Psychiatric History:      Past psychiatric diagnoses:   Schizophrenia, schizoaffective d/o, anxiety, depression   Inpatient psychiatric admissions:   10 times total (4 were involuntary)  First - age 22 after stealing sisters klonopin  Most common reason: first few were \"no reason\"  2017 - got invega shot was a very influential moment  2 suicide attempts/SI: SI and wanting to jump off a roof (2021) a few months later after sleeping pills, tried to hang himself 2 years ago  Psychosis 3 times (most recent for that reason was 3 years)  Most recent was 2 years ago  Has stayed out of the hospital due to \"taking his medications, not having suicidal ideation\"  Denies history of ECT, denies having had ICM in past.   Prior outpatient psychiatric treatment:   Saw " "Chilo Ramsey for 3 years  Saw psychiatrist at [facility] for a year before that  Past/current psychotherapy:   Worked with a therapist at [facility] Masood, stopped seeing 1-2 years ago because he left the practice  History of suicidal attempts/gestures:   2: took sleeping pills (August 2021), tried to hang self two years  History of non-suicidal self-injurious behavior:   None  History of violence/aggressive behaviors:   None  Psychotropic medication trials:   Invega, (discomfort) risperdal, zyprexa, thorazine (all unknown reasons for discontinuation)  Abilify (bad reaction)  Invega SCHAEFER x1 in 2019 (\"hurt his head\")  Ativan ('caused psychosis')  Loxapine - feeling too tired on dosages above 20mg  Risperdal - above 2mg causes nausea and \"hurting\" in his head  Clozapine - current medication  Melatonin - current medication  Substance abuse inpatient/outpatient rehabilitation:   Rehab in 2017 - marijuana use  Eating disorder history:   No  Other services: used to have a  but reports he does not anymore because they ran out of things to do together      Substance Abuse History:     States he was smoking a lot of marijuana for 5 years, nearly every day, stopped smoking 2019 July after he \"freaked out\" and thought \"there were people in his basement and living in his house and torturing him. \"     Denies any other recreational drug use and otherwise denies history of alcohol, illict substance, or tobacco abuse., Patient denies previous legal actions or arrests related to substance intoxication including prior DWIs/DUIs., Patient does not exhibit objective evidence of substance withdrawal during today's examination nor do they appear under the influence of any psychoactive substance.       Family Psychiatric History:      Family History   History reviewed. No pertinent family history.         Psychiatric Family History: Per patient he is adopted,  Per mother is not adopted. Maternal grandfather - schizophrenia  Tex's " "half sister has bipolar disorder     Social History:     Developmental: Was in special education, denies IEP  Per care everywhere, mild developmental delay, early intervention at age 2.5. Concern for autism, diagnosed with ADHD.  Has 2 sister (50 and 34 years old), states he was adopted but his mother reports he is not adopted  Education: high school diploma/GED  Marital history: single  Children: none  Living arrangement, social support: Family is supportive, mom is supportive. Lives with mother, on a waiting list for housing, on disability for schizoaffective disorder.   Dad lives in [place]   Occupational History: Permanent disability. 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 , Patient denies history of arrests or violence with a deadly weapon.    history: None     Traumatic History:      Abuse: Denies  Other Traumatic Events: Bullying, and would not disclose further.  ___________________________________      Visit Time    Visit Start Time: 3:07  Visit Stop Time: 3:30  Total Visit Duration:  23 minutes    Lauren Stockton MD   08/12/24    "

## 2024-08-12 NOTE — PATIENT INSTRUCTIONS
Your vitamin D level was low. We are going to increase vitamin D to 4000 IU daily (using 2000 IU tablets as they are covered by your insurance plan)    Please make appointment with psychological testing for autism evaluation     Please continue to encourage Tex to consider getting MRI    Please make an appointment with nutrition services:  [address] [address]  Phone: [phone]  Fax: [phone]    Please expect a call from behavioral health navigation about [facility]

## 2024-08-15 ENCOUNTER — SOCIAL WORK (OUTPATIENT)
Dept: BEHAVIORAL/MENTAL HEALTH CLINIC | Facility: CLINIC | Age: 29
End: 2024-08-15
Payer: COMMERCIAL

## 2024-08-15 DIAGNOSIS — F12.11 CANNABIS ABUSE, IN REMISSION: ICD-10-CM

## 2024-08-15 DIAGNOSIS — F20.9 SCHIZOPHRENIA, UNSPECIFIED TYPE (HCC): Primary | ICD-10-CM

## 2024-08-15 PROCEDURE — 90837 PSYTX W PT 60 MINUTES: CPT | Performed by: COUNSELOR

## 2024-08-19 NOTE — PSYCH
"Behavioral Health Psychotherapy Progress Note    Psychotherapy Provided: Individual Psychotherapy     1. Schizophrenia, unspecified type (HCC)        2. Cannabis abuse, in remission            Goals addressed in session: Goal 1     DATA: Sudhakar processed trauma around side effects of his medications and traumatic events in his life. He shared that he tried to work jobs but had difficulty keeping them due to his psychotic symptoms. We discussed how to let go of the past to heal. Clinician provided validation and support. Clinician offered art therapy but clt declined.  During this session, this clinician used the following therapeutic modalities: Client-centered Therapy    Substance Abuse was addressed during this session. If the client is diagnosed with a co-occurring substance use disorder, please indicate any changes in the frequency or amount of use: none. Stage of change for addressing substance use diagnoses: Maintenance    ASSESSMENT:  Tex Hill presents with a Dysthymic mood.     his affect is Normal range and intensity, which is congruent, with his mood and the content of the session. The client has made progress on their goals.     Tex Hill presents with a minimal risk of suicide, minimal risk of self-harm, and minimal risk of harm to others.    For any risk assessment that surpasses a \"low\" rating, a safety plan must be developed.    A safety plan was indicated: no  If yes, describe in detail N/A    PLAN: Between sessions, Tex Hill will use his supports and coping skills. At the next session, the therapist will use Client-centered Therapy to address stress.    Behavioral Health Treatment Plan and Discharge Planning: Tex Hill is aware of and agrees to continue to work on their treatment plan. They have identified and are working toward their discharge goals. yes    Visit start and stop times:    08/19/24  Start Time: 1300  Stop Time: 1354  Total Visit Time: 54 minutes  "

## 2024-08-20 ENCOUNTER — TELEPHONE (OUTPATIENT)
Age: 29
End: 2024-08-20

## 2024-08-20 NOTE — TELEPHONE ENCOUNTER
Writer spoke to patient to attempt to schedule appointment. Patient had asked if it would be at the [facility] location I let him know that as of right now we are only doing the testing at our [place] location. Patient said he was good he would like to be removed from the wait list. Writer then removed patient from the wait list.

## 2024-08-29 ENCOUNTER — SOCIAL WORK (OUTPATIENT)
Dept: BEHAVIORAL/MENTAL HEALTH CLINIC | Facility: CLINIC | Age: 29
End: 2024-08-29
Payer: COMMERCIAL

## 2024-08-29 DIAGNOSIS — F12.11 CANNABIS ABUSE, IN REMISSION: Primary | ICD-10-CM

## 2024-08-29 DIAGNOSIS — F20.9 SCHIZOPHRENIA, UNSPECIFIED TYPE (HCC): ICD-10-CM

## 2024-08-29 PROCEDURE — 90837 PSYTX W PT 60 MINUTES: CPT | Performed by: COUNSELOR

## 2024-09-10 NOTE — PSYCH
"Behavioral Health Psychotherapy Progress Note    Psychotherapy Provided: Individual Psychotherapy     1. Cannabis abuse, in remission        2. Schizophrenia, unspecified type (HCC)            Goals addressed in session: Goal 1     DATA: Clt reported that he has craving to use cannabis but has not used due to insight into negative consequences of use that causes psychotic symptoms. Clt has been watching movies at the movie theater and at home and shared his feelings around current stressors. He use dto like to [employer] but hasd a negative experience with the people at the [facility] park in the past. His parents remain supportive. Clinician provided validation and support.  During this session, this clinician used the following therapeutic modalities: Client-centered Therapy and Solution-Focused Therapy    Substance Abuse was addressed during this session. If the client is diagnosed with a co-occurring substance use disorder, please indicate any changes in the frequency or amount of use: none. Stage of change for addressing substance use diagnoses: Maintenance    ASSESSMENT:  Tex Hill presents with a Depressed mood.     his affect is Normal range and intensity, which is congruent, with his mood and the content of the session. The client has made progress on their goals.     Tex Hill presents with a minimal risk of suicide, minimal risk of self-harm, and minimal risk of harm to others.    For any risk assessment that surpasses a \"low\" rating, a safety plan must be developed.    A safety plan was indicated: no  If yes, describe in detail N/A    PLAN: Between sessions, Tex Hill will use his supports and coping skills. At the next session, the therapist will use Client-centered Therapy to address depression.    Behavioral Health Treatment Plan and Discharge Planning: Tex Hill is aware of and agrees to continue to work on their treatment plan. They have identified and are working toward their " discharge goals. yes    Visit start and stop times:    09/10/24  Start Time: 1300  Stop Time: 1353  Total Visit Time: 53 minutes

## 2024-09-17 DIAGNOSIS — F20.9 SCHIZOPHRENIA, UNSPECIFIED TYPE (HCC): ICD-10-CM

## 2024-09-17 RX ORDER — CLOZAPINE 50 MG/1
TABLET ORAL
Qty: 45 TABLET | Refills: 2 | Status: SHIPPED | OUTPATIENT
Start: 2024-09-17

## 2024-10-07 ENCOUNTER — TELEPHONE (OUTPATIENT)
Age: 29
End: 2024-10-07

## 2024-10-07 NOTE — TELEPHONE ENCOUNTER
Pt called to get his appt for 10/9 at 1:45 switched to a virtual visit due to pt car broke down.  Writer switched appt.

## 2024-10-09 ENCOUNTER — TELEPHONE (OUTPATIENT)
Age: 29
End: 2024-10-09

## 2024-10-09 ENCOUNTER — TELEMEDICINE (OUTPATIENT)
Dept: PSYCHIATRY | Facility: CLINIC | Age: 29
End: 2024-10-09

## 2024-10-09 DIAGNOSIS — E78.00 HIGH CHOLESTEROL: ICD-10-CM

## 2024-10-09 DIAGNOSIS — Z15.89 HOMOZYGOUS MTHFR MUTATION C677T: ICD-10-CM

## 2024-10-09 DIAGNOSIS — F20.9 SCHIZOPHRENIA, UNSPECIFIED TYPE (HCC): Primary | ICD-10-CM

## 2024-10-09 DIAGNOSIS — K59.03 DRUG-INDUCED CONSTIPATION: ICD-10-CM

## 2024-10-09 DIAGNOSIS — E55.9 VITAMIN D DEFICIENCY: ICD-10-CM

## 2024-10-09 PROCEDURE — NC001 PR NO CHARGE: Performed by: STUDENT IN AN ORGANIZED HEALTH CARE EDUCATION/TRAINING PROGRAM

## 2024-10-09 RX ORDER — CHOLECALCIFEROL (VITAMIN D3) 50 MCG
4000 TABLET ORAL DAILY
Qty: 60 TABLET | Refills: 2 | Status: SHIPPED | OUTPATIENT
Start: 2024-10-09

## 2024-10-09 RX ORDER — VITAMIN B COMPLEX
1 CAPSULE ORAL DAILY
Qty: 30 CAPSULE | Refills: 3 | Status: SHIPPED | OUTPATIENT
Start: 2024-10-09

## 2024-10-09 RX ORDER — SENNOSIDES 8.6 MG
8.6 TABLET ORAL
Qty: 30 TABLET | Refills: 2 | Status: SHIPPED | OUTPATIENT
Start: 2024-10-09

## 2024-10-09 RX ORDER — RISPERIDONE 2 MG/1
2 TABLET ORAL
Qty: 30 TABLET | Refills: 2 | Status: SHIPPED | OUTPATIENT
Start: 2024-10-09

## 2024-10-09 NOTE — PATIENT INSTRUCTIONS
I will be connecting with our  navigator who will try and help arrange transportation for blood draw next week. She will call you today.  We will continue current medications. Please restart vitamin D, and please take fish oil. You can find smaller 'mini' capsules over the counter.  Please skip sennakot on days when you are having multiple loose bowel movements, as this is a laxative and would make these symptoms worse. Goal is to have bowel movement daily. Clozapine puts people at risk for severe constipation which can cause life-threatening blockages, which is why we prescribe sennakot with it. As long as you are having regular bowel movements that is okay.    We will obtain an updated cholesterol panel and hemoglobin A1c in December (every 6 months)     If nausea with eating begins to occur with every meal, persists, or worsens please reach out to your PCP to investigate this further.     Please schedule with nutritionist, and schedule psychological evaluation.    Please take regular walks for your health.       ---------------------------------------------------------------------------------      Please call the office nursing staff for medication issues including refills, problems getting medications, bothersome side effects, etc at [phone]      Please return for a follow up appointment as discussed and arrive approximately 15 minutes prior to your appointment time. If you are running late or are unable to attend your appointment, please call our office at [phone]     If you have thoughts of harming yourself or are otherwise in psychological crisis, do not hesitate to contact your [facility] Crisis hotline, or 911 or go to the nearest emergency room.  [facility] Crisis: [phone]  [facility] Crisis: [phone]  [facility] Crisis: [phone]  [facility] Crisis: [phone]  [facility] Crisis: [phone]  [facility] Crisis: [phone]  [facility] Crisis:  [phone]  National Suicide Prevention Hotline: 1-881.777.7188 or call 767

## 2024-10-09 NOTE — ASSESSMENT & PLAN NOTE
Orders:    Omega-3 Fatty Acids (Fish Oil Minis) 500 MG CAPS; Take 2 capsules by mouth in the morning

## 2024-10-09 NOTE — ASSESSMENT & PLAN NOTE
Orders:    Cholecalciferol (Vitamin D3) 50 MCG (2000 UT) TABS; Take 2 tablets (4,000 Units total) by mouth daily

## 2024-10-09 NOTE — ASSESSMENT & PLAN NOTE
Patient reports occasional loose stools, instructed him to skip senna on days when he has loose stool   Orders:    senna (SENOKOT) 8.6 mg; Take 1 tablet (8.6 mg total) by mouth daily at bedtime

## 2024-10-09 NOTE — PSYCH
Virtual Regular Visit    Verification of patient location:    Patient is located at Home in the following state in which I hold an active license [place]      Assessment/Plan:    Problem List Items Addressed This Visit    None      Reason for visit is   Chief Complaint   Patient presents with    Virtual Regular Visit          Encounter provider Lauren Stockton MD      Recent Visits  No visits were found meeting these conditions.  Showing recent visits within past 7 days and meeting all other requirements  Today's Visits  Date Type Provider Dept   10/09/24 Telemedicine Lauren Stockton MD [facility]   Showing today's visits and meeting all other requirements  Future Appointments  No visits were found meeting these conditions.  Showing future appointments within next 150 days and meeting all other requirements       The patient was identified by name and date of birth. eTx Hill was informed that this is a telemedicine visit and that the visit is being conducted throughthe Epic Embedded platform. He agrees to proceed..  My office door was closed. No one else was in the room.  He acknowledged consent and understanding of privacy and security of the video platform. The patient has agreed to participate and understands they can discontinue the visit at any time.    Patient is aware this is a billable service.     Visit Time    Visit Start Time: 1:44  Visit Stop Time: 2:00  Total Visit Duration:  16 minutes    MEDICATION MANAGEMENT NOTE      [facility]: [facility]   Mental Health Outpatient Services   [address] [place] [address],[facility]   [phone]    Name and Date of Birth:  Tex Hill 29 y.o. 1995 MRN: [id number]    Date of Visit: October 9, 2024    Reason for Visit: Follow-up visit regarding medication management     _____________________________    Assessment & Plan   Tex Hill is a 29 y.o. male, Single, domiciled with mother, on  permanent disability, with past medical history of Vitamin D deficiency, prior psychiatric diagnoses of schizoaffective disorder; multiple hospitalizations and two suicide attempts (most recently 2021).  Tex was personally seen and evaluated today at the [facility] outpatient clinic for follow-up regarding medication management.        On assessment, Tex Hill reports he has maintained stability of his mood and mental health and feels clozapine therapy has been very helpful. He denies talking to himself and denies hearing any voices. He denies thoughts to harm himself or others. He has been taking all of his medication as prescribed with the exception of vitamin D and fish oil. Provided education about these medications and recommended he take them. Recommending he look for ''mini' fish oil capsules over the counter. He has not yet scheduled nutrition appointment, MRI, or psychological testing. He declines still to transition off of Risperdal and fully onto clozapine monotherapy, and is resistant to education about this. Risks for side effects increase on dual antipsychotic therapy.  Patient is currently on low doses of both medications. This regimen appears to be providing stability therefore benefits outweigh risks at this time. Patient currently having transportation problems and we will try to help address this so he is able to get his biweekly blood draw. Patient is in agreement with the treatment plan as detailed below, and agrees to call the office with any concerns or side effects between appointments.     DSM-5 Diagnoses/Visit Diagnoses:     Assessment & Plan  High cholesterol    Orders:    Omega-3 Fatty Acids (Fish Oil Minis) 500 MG CAPS; Take 2 capsules by mouth in the morning    Schizophrenia, unspecified type (HCC)  Continue clozapine with bi-weekly blood draws per REMS  Patient will need assistance with transportation, connecting with  navigator to assist in connecting with  ACM  Continue risperdal  Patient continues to decline transitioning off risperdal to clozapine fully, have been providing psycho-education about monotherapy at each visit  Orders:    Omega-3 Fatty Acids (Fish Oil Minis) 500 MG CAPS; Take 2 capsules by mouth in the morning    risperiDONE (RisperDAL) 2 mg tablet; Take 1 tablet (2 mg total) by mouth daily at bedtime    Drug-induced constipation  Patient reports occasional loose stools, instructed him to skip senna on days when he has loose stool   Orders:    senna (SENOKOT) 8.6 mg; Take 1 tablet (8.6 mg total) by mouth daily at bedtime    Homozygous MTHFR mutation [id number]    Orders:    b complex vitamins capsule; Take 1 capsule by mouth daily    Vitamin D deficiency    Orders:    Cholecalciferol (Vitamin D3) 50 MCG (2000 UT) TABS; Take 2 tablets (4,000 Units total) by mouth daily      Treatment Recommendations/Precautions:  Continue psychopharmacological management as follows:  Continue risperdal 2mg HS for psychosis  Clozapine 75mg HS, for psychosis  CBC with differential every other week per REMS program   Clozapine level = 70 (low) on 12/11 on 100mg dosage  AIMS = 0 on 5/1/24  Continue vitamin D supplementation for deficiency   Fish oil supplementation for brain health and for high triglycerides   Continue daily sennakot for clozapine induced constipation  Can skip on days he has multiple loose bowel movements  Melatonin 1mg HS PRN for sleep-wake cycle regulation  TSH, CMP, vitamin D and B12 collected in August 2024, reviewed. Lipid panel and Ha1c last collected in June 2024, reviewed.    Obtain updated lipid panel and Ha1c in December, will order at next visit  Follow up in 6 weeks for medication management  Referred previously for nutrition services due to medication induced weight gain and hyperlipidemia  Referred previously for psychological/neuropsychological testing for autism evaluation  Reached out to  navigator about [facility] referral - pt declining this,  will re-evaluate interest at a later time  Referred for MRI and encouraged this to be completed for psychosis workup and head pain/pressure symptoms - pt declining this, still discussing with him at each visit to encourage him to complete this  Continue individual psychotherapy with Eleni Bowers  Follow up with PCP for medical issues and ongoing care  Aware of 24 hour and weekend coverage for urgent situations accessed by calling [facility] main practice number    Individual psychotherapy provided: No     Treatment Plan:     Completed and signed during the session: Not applicable - Treatment Plan not due at this session    Medications Risks/Benefits:      Risks, Benefits And Possible Side Effects Of Medications:    Risks, benefits, and possible side effects of medications explained to Tex and he verbalizes understanding and agreement for treatment.     Controlled Medication Discussion:     Not applicable    Medical Decision Making / Counseling / Coordination of Care:  The following interventions are recommended: return in 6 weeks for follow up.  Although patient's acute lethality risk is LOW, long-term/chronic lethality risk is mildly elevated given the risk factors listed above. However, at the current moment, Tex is future-oriented, forward-thinking, and demonstrates ability to act in a self-preserving manner as evidenced by volitionally seeking psychiatric evaluation and treatment today. To mitigate future risk, patient should adhere to treatment recommendations, avoid alcohol/illicit substance use, utilize community-based resources and familiar support, and prioritize mental health treatment. The diagnosis and treatment plan were reviewed with the patient. Risks, benefits, and alternatives to treatment were discussed. The importance of medication and treatment compliance was reviewed with the patient.     _____________________________________________    History of  "Present Illness     Chief Complaint: \"I'm good\"    SUBJECTIVE:    Tex Hill is a 29 y.o. male, possessing a past psychiatric history significant for schizophrenia, who was personally seen and evaluated at the [facility] outpatient clinic virtually for follow-up regarding medication management.  At their last visit, the plan was to continue his current medication plan.    Tex states that since their previous psychiatric appointment with this writer, he has been doing well. Mood has been good, denies feeling anxious, denies feeling down or depressed, has been going outside, taking short walks. He reports the nice weather makes him feel happier. He has not been talking to himself and denies hearing any voices and denies visual hallucinations. He denies thoughts to harm himself or others. Today had to switch to virtual visit due to loss of transportation; reports that his mother also does not have transportation. He is concerned he will not be able to make it to bloodwork next week for clozapine REMS. Will connect with  navigator to discuss resources.    Tex denies side effects with medication but reports having multiple epsiodes of loose stool per day. Discussed he can skip sennakot on those days he is having multiple episodes of loose stool. Will remind of this in AVS. Additionally patient is reporting episodes of nausea with eating at times, but is able to eat his meals and is not having frequent emesis, so we will monitor this for now. Will provide instruction in AVS to reach out to PCP if nausea occurs with every meal, worsens, or persists.    Presently, patient denies suicidal/homicidal ideation in addition to thoughts of self-injury.  At conclusion of evaluation, patient is amenable to the recommendations of this writer including: continue psychotropic medications as prescribed.  Also, patient is amenable to calling/contacting the outpatient office including this writer if any acute " adverse effects of their medication regimen arise in addition to any comments or concerns pertaining to their psychiatric management.  Patient is amenable to calling/contacting crisis and/or attending to the nearest emergency department if their clinical condition deteriorates to assure their safety and stability, stating that they are able to appropriately confide in their mother regarding their psychiatric state.    Current Rating Scores:     Current PHQ-9   PHQ-2/9 Depression Screening           Current JESSE-7 is .    Psychiatric Review Of Systems:  Unchanged information from this writer's previous assessment is copied and italicized; information that has changed is bolded.    Appetite: has been only eating a couple bites of food and feeling nauseated, occasional emesis. Still eating enough and no weight changes  Adverse eating: no  Weight changes: denies  Insomnia/sleeplessness: goes to bed at 7 or 8, and awake at 9AM, wakes up a lot throughout the night, takes 1 hr to fall asleep initially   Fatigue/anergy: denies  Anhedonia/lack of interest: unchanged - watching movie every day  Attention/concentration: denies   Psychomotor agitation/retardation: no  Somatic symptoms: no  Anxiety/panic attack: denies  Mylene/hypomania: no  Hopelessness/helplessness/worthlessness: no  Self-injurious behavior/high-risk behavior: no  Suicidal ideation: no  Homicidal ideation: no  Auditory hallucinations: denies  Visual hallucinations: no  Other perceptual disturbances: no  Delusional thinking: no  Obsessive/compulsive symptoms: no    Review Of Systems:      Constitutional negative   ENT negative   Cardiovascular negative   Respiratory negative   Gastrointestinal as noted in HPI, reports 5 loose stools per dayr ecently   Genitourinary negative   Musculoskeletal negative   Integumentary negative   Neurological negative   Endocrine negative   Other Symptoms none, all other systems are negative     Objective    OBJECTIVE:     Visit  "Vitals  Smoking Status Never      Wt Readings from Last 6 Encounters:   07/24/24 90.1 kg (198 lb 9.6 oz)        No past medical history on file.   No past surgical history on file.      Mental Status Exam:    Appearance age appropriate, casually dressed, seated at home, good eye contact with camera   Behavior cooperative, calm   Speech normal rate, normal volume, normal pitch, scant   Mood \"Good\"   Affect blunted   Thought Processes organized, goal directed   Associations concrete associations   Thought Content no overt delusions   Perceptual Disturbances: Denies auditory or visual hallucinations and Does not appear to be responding to internal stimuli   Abnormal Thoughts  Risk Potential Denies suicidal or homicidal ideation, plan, or intent   Orientation oriented to person, place, time/date, and situation   Memory recent and remote memory grossly intact   Consciousness alert and awake   Attention Span Concentration Span attention span and concentration are age appropriate   Intellect appears to be of average intelligence   Insight fair   Judgement fair   Muscle Strength and  Gait unable to assess today due to virtual visit   Motor Activity unable to assess today due to virtual visit   Language no difficulty naming common objects, no difficulty repeating a phrase, no difficulty writing a sentence   Fund of Knowledge adequate knowledge of current events  adequate fund of knowledge regarding past history  adequate fund of knowledge regarding vocabulary        Meds/Allergies    Allergies   Allergen Reactions    Gantrisin [Sulfisoxazole] Hallucinations     Took as a child and had hallucinations per mother     Current Outpatient Medications   Medication Instructions    b complex vitamins capsule 1 capsule, Oral, Daily    clozapine (CLOZARIL) 50 MG tablet TAKE 1 & 1/2 TABLETS BY MOUTH DAILY AT BEDTIME    Fish Oil Burp-Less 1,000 mg, Oral, Daily    melatonin 1 mg, Oral, Daily at bedtime PRN    risperiDONE (RISPERDAL) 2 mg, " Oral, Daily at bedtime    senna (SENOKOT) 8.6 mg, Oral, Daily at bedtime    Vitamin D3 4,000 Units, Oral, Daily         Laboratory Results: I have personally reviewed all pertinent laboratory/tests results    Ancillary Orders on 09/27/2024   Component Date Value Ref Range Status    White Blood Cell Count 10/01/2024 7.4  3.4 - 10.8 x10E3/uL Final    Red Blood Cell Count 10/01/2024 5.17  4.14 - 5.80 x10E6/uL Final    Hemoglobin 10/01/2024 15.2  13.0 - 17.7 g/dL Final    HCT 10/01/2024 44.7  37.5 - 51.0 % Final    MCV 10/01/2024 87  79 - 97 fL Final    MCH 10/01/2024 29.4  26.6 - 33.0 pg Final    MCHC 10/01/2024 34.0  31.5 - 35.7 g/dL Final    RDW 10/01/2024 13.1  11.6 - 15.4 % Final    Platelet Count 10/01/2024 321  150 - 450 x10E3/uL Final    Neutrophils 10/01/2024 62  Not Estab. % Final    Lymphocytes 10/01/2024 28  Not Estab. % Final    Monocytes 10/01/2024 9  Not Estab. % Final    Eosinophils 10/01/2024 0  Not Estab. % Final    Basophils PCT 10/01/2024 1  Not Estab. % Final    Neutrophils (Absolute) 10/01/2024 4.5  1.4 - 7.0 x10E3/uL Final    Lymphocytes (Absolute) 10/01/2024 2.1  0.7 - 3.1 x10E3/uL Final    Monocytes (Absolute) 10/01/2024 0.7  0.1 - 0.9 x10E3/uL Final    Eosinophils (Absolute) 10/01/2024 0.0  0.0 - 0.4 x10E3/uL Final    Basophils ABS 10/01/2024 0.1  0.0 - 0.2 x10E3/uL Final    Immature Granulocytes 10/01/2024 0  Not Estab. % Final    Immature Granulocytes (Absolute) 10/01/2024 0.0  0.0 - 0.1 x10E3/uL Final   Ancillary Orders on 08/30/2024   Component Date Value Ref Range Status    White Blood Cell Count 09/03/2024 7.1  3.4 - 10.8 x10E3/uL Final    Red Blood Cell Count 09/03/2024 4.82  4.14 - 5.80 x10E6/uL Final    Hemoglobin 09/03/2024 13.9  13.0 - 17.7 g/dL Final    HCT 09/03/2024 41.0  37.5 - 51.0 % Final    MCV 09/03/2024 85  79 - 97 fL Final    MCH 09/03/2024 28.8  26.6 - 33.0 pg Final    MCHC 09/03/2024 33.9  31.5 - 35.7 g/dL Final    RDW 09/03/2024 13.1  11.6 - 15.4 % Final    Platelet  Count 09/03/2024 277  150 - 450 x10E3/uL Final    Neutrophils 09/03/2024 65  Not Estab. % Final    Lymphocytes 09/03/2024 25  Not Estab. % Final    Monocytes 09/03/2024 9  Not Estab. % Final    Eosinophils 09/03/2024 0  Not Estab. % Final    Basophils PCT 09/03/2024 1  Not Estab. % Final    Neutrophils (Absolute) 09/03/2024 4.5  1.4 - 7.0 x10E3/uL Final    Lymphocytes (Absolute) 09/03/2024 1.8  0.7 - 3.1 x10E3/uL Final    Monocytes (Absolute) 09/03/2024 0.7  0.1 - 0.9 x10E3/uL Final    Eosinophils (Absolute) 09/03/2024 0.0  0.0 - 0.4 x10E3/uL Final    Basophils ABS 09/03/2024 0.1  0.0 - 0.2 x10E3/uL Final    Immature Granulocytes 09/03/2024 0  Not Estab. % Final    Immature Granulocytes (Absolute) 09/03/2024 0.0  0.0 - 0.1 x10E3/uL Final   Ancillary Orders on 08/16/2024   Component Date Value Ref Range Status    White Blood Cell Count 08/19/2024 6.7  3.4 - 10.8 x10E3/uL Final    Red Blood Cell Count 08/19/2024 5.09  4.14 - 5.80 x10E6/uL Final    Hemoglobin 08/19/2024 14.8  13.0 - 17.7 g/dL Final    HCT 08/19/2024 44.1  37.5 - 51.0 % Final    MCV 08/19/2024 87  79 - 97 fL Final    MCH 08/19/2024 29.1  26.6 - 33.0 pg Final    MCHC 08/19/2024 33.6  31.5 - 35.7 g/dL Final    RDW 08/19/2024 13.2  11.6 - 15.4 % Final    Platelet Count 08/19/2024 295  150 - 450 x10E3/uL Final    Neutrophils 08/19/2024 62  Not Estab. % Final    Lymphocytes 08/19/2024 28  Not Estab. % Final    Monocytes 08/19/2024 9  Not Estab. % Final    Eosinophils 08/19/2024 0  Not Estab. % Final    Basophils PCT 08/19/2024 1  Not Estab. % Final    Neutrophils (Absolute) 08/19/2024 4.2  1.4 - 7.0 x10E3/uL Final    Lymphocytes (Absolute) 08/19/2024 1.9  0.7 - 3.1 x10E3/uL Final    Monocytes (Absolute) 08/19/2024 0.6  0.1 - 0.9 x10E3/uL Final    Eosinophils (Absolute) 08/19/2024 0.0  0.0 - 0.4 x10E3/uL Final    Basophils ABS 08/19/2024 0.1  0.0 - 0.2 x10E3/uL Final    Immature Granulocytes 08/19/2024 0  Not Estab. % Final    Immature Granulocytes (Absolute)  08/19/2024 0.0  0.0 - 0.1 x10E3/uL Final   Ancillary Orders on 08/02/2024   Component Date Value Ref Range Status    White Blood Cell Count 08/05/2024 6.8  3.4 - 10.8 x10E3/uL Final    Red Blood Cell Count 08/05/2024 5.15  4.14 - 5.80 x10E6/uL Final    Hemoglobin 08/05/2024 14.8  13.0 - 17.7 g/dL Final    HCT 08/05/2024 44.3  37.5 - 51.0 % Final    MCV 08/05/2024 86  79 - 97 fL Final    MCH 08/05/2024 28.7  26.6 - 33.0 pg Final    MCHC 08/05/2024 33.4  31.5 - 35.7 g/dL Final    RDW 08/05/2024 13.2  11.6 - 15.4 % Final    Platelet Count 08/05/2024 271  150 - 450 x10E3/uL Final    Neutrophils 08/05/2024 61  Not Estab. % Final    Lymphocytes 08/05/2024 27  Not Estab. % Final    Monocytes 08/05/2024 10  Not Estab. % Final    Eosinophils 08/05/2024 0  Not Estab. % Final    Basophils PCT 08/05/2024 1  Not Estab. % Final    Neutrophils (Absolute) 08/05/2024 4.1  1.4 - 7.0 x10E3/uL Final    Lymphocytes (Absolute) 08/05/2024 1.8  0.7 - 3.1 x10E3/uL Final    Monocytes (Absolute) 08/05/2024 0.7  0.1 - 0.9 x10E3/uL Final    Eosinophils (Absolute) 08/05/2024 0.0  0.0 - 0.4 x10E3/uL Final    Basophils ABS 08/05/2024 0.1  0.0 - 0.2 x10E3/uL Final    Immature Granulocytes 08/05/2024 1  Not Estab. % Final    Immature Granulocytes (Absolute) 08/05/2024 0.0  0.0 - 0.1 x10E3/uL Final   Office Visit on 07/24/2024   Component Date Value Ref Range Status    TSH 08/05/2024 2.320  0.450 - 4.500 uIU/mL Final    Glucose, Random 08/05/2024 88  70 - 99 mg/dL Final    BUN 08/05/2024 11  6 - 20 mg/dL Final    Creatinine 08/05/2024 0.98  0.76 - 1.27 mg/dL Final    eGFR 08/05/2024 108  >59 mL/min/1.73 Final    SL AMB BUN/CREATININE RATIO 08/05/2024 11  9 - 20 Final    Sodium 08/05/2024 140  134 - 144 mmol/L Final    Potassium 08/05/2024 4.4  3.5 - 5.2 mmol/L Final    Chloride 08/05/2024 101  96 - 106 mmol/L Final    CO2 08/05/2024 23  20 - 29 mmol/L Final    CALCIUM 08/05/2024 9.5  8.7 - 10.2 mg/dL Final    Protein, Total 08/05/2024 7.0  6.0 - 8.5  g/dL Final    Albumin 08/05/2024 4.5  4.3 - 5.2 g/dL Final    Globulin, Total 08/05/2024 2.5  1.5 - 4.5 g/dL Final    TOTAL BILIRUBIN 08/05/2024 0.5  0.0 - 1.2 mg/dL Final    Alk Phos Isoenzymes 08/05/2024 119  44 - 121 IU/L Final    AST 08/05/2024 26  0 - 40 IU/L Final    ALT 08/05/2024 35  0 - 44 IU/L Final    25-HYDROXY VIT D 08/05/2024 17.0 (L)  30.0 - 100.0 ng/mL Final    Comment: Vitamin D deficiency has been defined by the [place] of  Medicine and an Endocrine Society practice guideline as a  level of serum 25-OH vitamin D less than 20 ng/mL (1,2).  The Endocrine Society went on to further define vitamin D  insufficiency as a level between 21 and 29 ng/mL (2).  1. IOM ([place] [employer]). 2010. Dietary reference     intakes for calcium and D. [place]: The     National Academies Press.  2. Iwona MF, Yaritza STERN, Chandan HERNANDEZ, et al.     Evaluation, treatment, and prevention of vitamin D     deficiency: an Endocrine Society clinical practice     guideline. JCEM. 2011 Jul; 96(7):1911-30.      Vitamin B-12 08/05/2024 442  232 - 1,245 pg/mL Final   Ancillary Orders on 07/19/2024   Component Date Value Ref Range Status    White Blood Cell Count 07/22/2024 6.2  3.4 - 10.8 x10E3/uL Final    Red Blood Cell Count 07/22/2024 4.89  4.14 - 5.80 x10E6/uL Final    Hemoglobin 07/22/2024 14.2  13.0 - 17.7 g/dL Final    HCT 07/22/2024 41.1  37.5 - 51.0 % Final    MCV 07/22/2024 84  79 - 97 fL Final    MCH 07/22/2024 29.0  26.6 - 33.0 pg Final    MCHC 07/22/2024 34.5  31.5 - 35.7 g/dL Final    RDW 07/22/2024 13.0  11.6 - 15.4 % Final    Platelet Count 07/22/2024 275  150 - 450 x10E3/uL Final    Neutrophils 07/22/2024 65  Not Estab. % Final    Lymphocytes 07/22/2024 26  Not Estab. % Final    Monocytes 07/22/2024 8  Not Estab. % Final    Eosinophils 07/22/2024 0  Not Estab. % Final    Basophils PCT 07/22/2024 1  Not Estab. % Final    Neutrophils (Absolute) 07/22/2024 4.1  1.4 - 7.0 x10E3/uL Final    Lymphocytes  (Absolute) 07/22/2024 1.6  0.7 - 3.1 x10E3/uL Final    Monocytes (Absolute) 07/22/2024 0.5  0.1 - 0.9 x10E3/uL Final    Eosinophils (Absolute) 07/22/2024 0.0  0.0 - 0.4 x10E3/uL Final    Basophils ABS 07/22/2024 0.1  0.0 - 0.2 x10E3/uL Final    Immature Granulocytes 07/22/2024 0  Not Estab. % Final    Immature Granulocytes (Absolute) 07/22/2024 0.0  0.0 - 0.1 x10E3/uL Final   Ancillary Orders on 07/05/2024   Component Date Value Ref Range Status    White Blood Cell Count 07/08/2024 6.2  3.4 - 10.8 x10E3/uL Final    Red Blood Cell Count 07/08/2024 4.97  4.14 - 5.80 x10E6/uL Final    Hemoglobin 07/08/2024 14.7  13.0 - 17.7 g/dL Final    HCT 07/08/2024 42.4  37.5 - 51.0 % Final    MCV 07/08/2024 85  79 - 97 fL Final    MCH 07/08/2024 29.6  26.6 - 33.0 pg Final    MCHC 07/08/2024 34.7  31.5 - 35.7 g/dL Final    RDW 07/08/2024 13.4  11.6 - 15.4 % Final    Platelet Count 07/08/2024 272  150 - 450 x10E3/uL Final    Neutrophils 07/08/2024 60  Not Estab. % Final    Lymphocytes 07/08/2024 30  Not Estab. % Final    Monocytes 07/08/2024 9  Not Estab. % Final    Eosinophils 07/08/2024 0  Not Estab. % Final    Basophils PCT 07/08/2024 1  Not Estab. % Final    Neutrophils (Absolute) 07/08/2024 3.7  1.4 - 7.0 x10E3/uL Final    Lymphocytes (Absolute) 07/08/2024 1.8  0.7 - 3.1 x10E3/uL Final    Monocytes (Absolute) 07/08/2024 0.6  0.1 - 0.9 x10E3/uL Final    Eosinophils (Absolute) 07/08/2024 0.0  0.0 - 0.4 x10E3/uL Final    Basophils ABS 07/08/2024 0.1  0.0 - 0.2 x10E3/uL Final    Immature Granulocytes 07/08/2024 0  Not Estab. % Final    Immature Granulocytes (Absolute) 07/08/2024 0.0  0.0 - 0.1 x10E3/uL Final   Ancillary Orders on 06/21/2024   Component Date Value Ref Range Status    White Blood Cell Count 06/24/2024 6.5  3.4 - 10.8 x10E3/uL Final    Red Blood Cell Count 06/24/2024 4.84  4.14 - 5.80 x10E6/uL Final    Hemoglobin 06/24/2024 14.0  13.0 - 17.7 g/dL Final    HCT 06/24/2024 40.9  37.5 - 51.0 % Final    MCV 06/24/2024 85   79 - 97 fL Final    MCH 06/24/2024 28.9  26.6 - 33.0 pg Final    MCHC 06/24/2024 34.2  31.5 - 35.7 g/dL Final    RDW 06/24/2024 13.1  11.6 - 15.4 % Final    Platelet Count 06/24/2024 270  150 - 450 x10E3/uL Final    Neutrophils 06/24/2024 59  Not Estab. % Final    Lymphocytes 06/24/2024 29  Not Estab. % Final    Monocytes 06/24/2024 10  Not Estab. % Final    Eosinophils 06/24/2024 0  Not Estab. % Final    Basophils PCT 06/24/2024 1  Not Estab. % Final    Neutrophils (Absolute) 06/24/2024 3.9  1.4 - 7.0 x10E3/uL Final    Lymphocytes (Absolute) 06/24/2024 1.9  0.7 - 3.1 x10E3/uL Final    Monocytes (Absolute) 06/24/2024 0.6  0.1 - 0.9 x10E3/uL Final    Eosinophils (Absolute) 06/24/2024 0.0  0.0 - 0.4 x10E3/uL Final    Basophils ABS 06/24/2024 0.1  0.0 - 0.2 x10E3/uL Final    Immature Granulocytes 06/24/2024 1  Not Estab. % Final    Immature Granulocytes (Absolute) 06/24/2024 0.0  0.0 - 0.1 x10E3/uL Final   Ancillary Orders on 06/07/2024   Component Date Value Ref Range Status    White Blood Cell Count 06/11/2024 8.0  3.4 - 10.8 x10E3/uL Final    Red Blood Cell Count 06/11/2024 4.89  4.14 - 5.80 x10E6/uL Final    Hemoglobin 06/11/2024 13.9  13.0 - 17.7 g/dL Final    HCT 06/11/2024 40.8  37.5 - 51.0 % Final    MCV 06/11/2024 83  79 - 97 fL Final    MCH 06/11/2024 28.4  26.6 - 33.0 pg Final    MCHC 06/11/2024 34.1  31.5 - 35.7 g/dL Final    RDW 06/11/2024 13.0  11.6 - 15.4 % Final    Platelet Count 06/11/2024 291  150 - 450 x10E3/uL Final    Neutrophils 06/11/2024 66  Not Estab. % Final    Lymphocytes 06/11/2024 23  Not Estab. % Final    Monocytes 06/11/2024 10  Not Estab. % Final    Eosinophils 06/11/2024 0  Not Estab. % Final    Basophils PCT 06/11/2024 1  Not Estab. % Final    Neutrophils (Absolute) 06/11/2024 5.3  1.4 - 7.0 x10E3/uL Final    Lymphocytes (Absolute) 06/11/2024 1.8  0.7 - 3.1 x10E3/uL Final    Monocytes (Absolute) 06/11/2024 0.8  0.1 - 0.9 x10E3/uL Final    Eosinophils (Absolute) 06/11/2024 0.0  0.0 -  0.4 x10E3/uL Final    Basophils ABS 06/11/2024 0.1  0.0 - 0.2 x10E3/uL Final    Immature Granulocytes 06/11/2024 0  Not Estab. % Final    Immature Granulocytes (Absolute) 06/11/2024 0.0  0.0 - 0.1 x10E3/uL Final   Orders Only on 05/30/2024   Component Date Value Ref Range Status    Cholesterol, Total 06/10/2024 203 (H)  100 - 199 mg/dL Final    Triglycerides 06/10/2024 215 (H)  0 - 149 mg/dL Final    HDL 06/10/2024 33 (L)  >39 mg/dL Final    VLDL Cholesterol Calculated 06/10/2024 39  5 - 40 mg/dL Final    LDL Calculated 06/10/2024 131 (H)  0 - 99 mg/dL Final    LDL CALC COMMENT 06/10/2024 CANCELED   Final-Edited    Comment: Test not performed    Result canceled by the ancillary.      LDl/HDL Ratio 06/10/2024 4.0 (H)  0.0 - 3.6 ratio Final    Comment:                                     LDL/HDL Ratio                                              Men  Women                                1/2 Avg.Risk  1.0    1.5                                    Avg.Risk  3.6    3.2                                 2X Avg.Risk  6.2    5.0                                 3X Avg.Risk  8.0    6.1      Hemoglobin A1C 06/10/2024 5.5  4.8 - 5.6 % Final    Comment:          Prediabetes: 5.7 - 6.4           Diabetes: >6.4           Glycemic control for adults with diabetes: <7.0      Estimated Average Glucose 06/10/2024 111  mg/dL Final   There may be more visits with results that are not included.             ___________________________________    History Review: The following portions of the patient's history were reviewed and updated as appropriate: allergies, current medications, past family history, past medical history, past social history, past surgical history, and problem list.    Unchanged information from this writer's previous assessment is copied and italicized; information that has changed is bolded.      Past Psychiatric History:      Past psychiatric diagnoses:   Schizophrenia, schizoaffective d/o, anxiety, depression  "  Inpatient psychiatric admissions:   10 times total (4 were involuntary)  First - age 22 after stealing sisters klonopin  Most common reason: first few were \"no reason\"  2017 - got invega shot was a very influential moment  2 suicide attempts/SI: SI and wanting to jump off a roof (2021) a few months later after sleeping pills, tried to hang himself 2 years ago  Psychosis 3 times (most recent for that reason was 3 years)  Most recent was 2 years ago  Has stayed out of the hospital due to \"taking his medications, not having suicidal ideation\"  Denies history of ECT, denies having had ICM in past.   Prior outpatient psychiatric treatment:   Saw Chilo Ramsey for 3 years  Saw psychiatrist at [facility] for a year before that  Past/current psychotherapy:   Worked with a therapist at [facility] Masood, stopped seeing 1-2 years ago because he left the practice  History of suicidal attempts/gestures:   2: took sleeping pills (August 2021), tried to hang self two years  History of non-suicidal self-injurious behavior:   None  History of violence/aggressive behaviors:   None  Psychotropic medication trials:   Invega, (discomfort) risperdal, zyprexa, thorazine (all unknown reasons for discontinuation)  Abilify (bad reaction)  Invega SCHAEFER x1 in 2019 (\"hurt his head\")  Ativan ('caused psychosis')  Loxapine - feeling too tired on dosages above 20mg  Risperdal - above 2mg causes nausea and \"hurting\" in his head  Clozapine - current medication  Melatonin - current medication  Substance abuse inpatient/outpatient rehabilitation:   Rehab in 2017 - marijuana use  Eating disorder history:   No  Other services: used to have a  but reports he does not anymore because they ran out of things to do together      Substance Abuse History:     States he was smoking a lot of marijuana for 5 years, nearly every day, stopped smoking 2019 July after he \"freaked out\" and thought \"there were people in his basement and living in his house and " "torturing him. \"     Denies any other recreational drug use and otherwise denies history of alcohol, illict substance, or tobacco abuse., Patient denies previous legal actions or arrests related to substance intoxication including prior DWIs/DUIs., Patient does not exhibit objective evidence of substance withdrawal during today's examination nor do they appear under the influence of any psychoactive substance.       Family Psychiatric History:      Family History   History reviewed. No pertinent family history.         Psychiatric Family History: Per patient he is adopted,  Per mother is not adopted. Maternal grandfather - schizophrenia  Tex's half sister has bipolar disorder     Social History:     Developmental: Was in special education, denies IEP  Per care everywhere, mild developmental delay, early intervention at age 2.5. Concern for autism, diagnosed with ADHD.  Has 2 sister (50 and 34 years old), states he was adopted but his mother reports he is not adopted  Education: high school diploma/GED  Marital history: single  Children: none  Living arrangement, social support: Family is supportive, mom is supportive. Lives with mother, on a waiting list for housing, on disability for schizoaffective disorder.   Dad lives in [place]   Occupational History: Permanent disability. 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 , Patient denies history of arrests or violence with a deadly weapon.    history: None     Traumatic History:      Abuse: Denies  Other Traumatic Events: Bullying, and would not disclose further.  ___________________________________      Lauren Stockton MD   10/09/24      "

## 2024-10-09 NOTE — ASSESSMENT & PLAN NOTE
Continue clozapine with bi-weekly blood draws per [facility]  Patient will need assistance with transportation, connecting with  navigator to assist in connecting with [facility]  Continue risperdal  Patient continues to decline transitioning off risperdal to clozapine fully, have been providing psycho-education about monotherapy at each visit  Orders:    Omega-3 Fatty Acids (Fish Oil Minis) 500 MG CAPS; Take 2 capsules by mouth in the morning    risperiDONE (RisperDAL) 2 mg tablet; Take 1 tablet (2 mg total) by mouth daily at bedtime

## 2024-10-09 NOTE — TELEPHONE ENCOUNTER
Patient called the RX Refill Line. Message is being forwarded to the office.     Mother is requesting that orders for his CBC w/ differential be faxed over to [facility]. States he gets them done every two weeks. She just wants to make sure that he has enough orders at the lab until he sees doc again.  Fax number: [phone]    Mother did mention about the cholesterol lab orders - she was unsure when they needed to be obtained. She stated she looked on his MyChart and did not see the order for them.    Please contact patient's mother at   [phone]

## 2024-10-15 ENCOUNTER — TELEPHONE (OUTPATIENT)
Dept: PSYCHIATRY | Facility: CLINIC | Age: 29
End: 2024-10-15

## 2024-10-15 NOTE — TELEPHONE ENCOUNTER
Lvm for pt to call [facility]   [phone]  if pt would like to schedule an office visit with Eleni Jones MS

## 2024-10-22 DIAGNOSIS — F20.9 SCHIZOPHRENIA, UNSPECIFIED TYPE (HCC): ICD-10-CM

## 2024-10-23 RX ORDER — RISPERIDONE 2 MG/1
2 TABLET ORAL
Qty: 30 TABLET | Refills: 0 | OUTPATIENT
Start: 2024-10-23

## 2024-10-23 NOTE — TELEPHONE ENCOUNTER
Medication Refill Request     Name risperiDONE (RisperDAL) 2 mg tablet    Dose/Frequency     Take 1 tablet (2 mg total) by mouth daily at bedtime     Quantity 30  Verified pharmacy   [x]  Verified ordering Provider   [x]  Does patient have enough for the next 3 days? Yes [x] No []  
Please notify patient's mother that 3 month supply of medication was sent on 10/9 (30 days with 2 refills)    If pharmacy does not have this Rx please let me know and I can resend.  
motrin or advil

## 2024-11-06 ENCOUNTER — DOCUMENTATION (OUTPATIENT)
Dept: BEHAVIORAL/MENTAL HEALTH CLINIC | Facility: CLINIC | Age: 29
End: 2024-11-06

## 2024-11-06 NOTE — PROGRESS NOTES
.Psychotherapy Discharge Summary    Preferred Name: Tex Hill  YOB: 1995    Admission date to psychotherapy: 04/18/24    Referred by: self    Presenting Problem: schizoaffective disorder, depression    Course of treatment included : individual therapy     Progress/Outcome of Treatment Goals (brief summary of course of treatment) Clt started individual therapy with Horace Fernandez and was transferred to this clinician due to Horace's transition on 5/30/24. He stopped attending sessions after 8/29/24 and did not respond to outreach.    Treatment Complications (if any): None    Treatment Progress: fair    Current [facility] Psychiatric Provider: Dr. Lauren Stockton MD    Discharge Medications include: see note    Discharge Date: 11/06/24    Discharge Diagnosis: No diagnosis found.    Criteria for Discharge: two or more unexcused absences for services    Aftercare recommendations include (include specific referral names and phone numbers, if appropriate): Continue with medication management    Prognosis: fair

## 2024-11-13 ENCOUNTER — TELEMEDICINE (OUTPATIENT)
Dept: PSYCHIATRY | Facility: CLINIC | Age: 29
End: 2024-11-13

## 2024-11-13 ENCOUNTER — TELEPHONE (OUTPATIENT)
Dept: PSYCHIATRY | Facility: CLINIC | Age: 29
End: 2024-11-13

## 2024-11-13 DIAGNOSIS — Z13.228 SCREENING FOR ENDOCRINE, NUTRITIONAL, METABOLIC AND IMMUNITY DISORDER: ICD-10-CM

## 2024-11-13 DIAGNOSIS — Z13.29 SCREENING FOR ENDOCRINE, NUTRITIONAL, METABOLIC AND IMMUNITY DISORDER: ICD-10-CM

## 2024-11-13 DIAGNOSIS — E55.9 VITAMIN D DEFICIENCY: ICD-10-CM

## 2024-11-13 DIAGNOSIS — Z79.899 LONG TERM CURRENT USE OF ANTIPSYCHOTIC MEDICATION: ICD-10-CM

## 2024-11-13 DIAGNOSIS — Z79.899 LONG TERM CURRENT USE OF CLOZAPINE: ICD-10-CM

## 2024-11-13 DIAGNOSIS — Z13.0 SCREENING FOR ENDOCRINE, NUTRITIONAL, METABOLIC AND IMMUNITY DISORDER: ICD-10-CM

## 2024-11-13 DIAGNOSIS — F20.9 SCHIZOPHRENIA, UNSPECIFIED TYPE (HCC): Primary | ICD-10-CM

## 2024-11-13 DIAGNOSIS — Z13.21 SCREENING FOR ENDOCRINE, NUTRITIONAL, METABOLIC AND IMMUNITY DISORDER: ICD-10-CM

## 2024-11-13 PROCEDURE — NC001 PR NO CHARGE: Performed by: PSYCHIATRY & NEUROLOGY

## 2024-11-13 NOTE — PSYCH
Virtual Regular Visit    Verification of patient location:    Patient is located at Home in the following state in which I hold an active license [place]             Reason for visit is   Chief Complaint   Patient presents with    Virtual Regular Visit          Encounter provider Lauren Stockton MD      Recent Visits  No visits were found meeting these conditions.  Showing recent visits within past 7 days and meeting all other requirements  Today's Visits  Date Type Provider Dept   11/13/24 Telemedicine Lauren Stockton MD [facility]   Showing today's visits and meeting all other requirements  Future Appointments  No visits were found meeting these conditions.  Showing future appointments within next 150 days and meeting all other requirements       The patient was identified by name and date of birth. Tex Hill was informed that this is a telemedicine visit and that the visit is being conducted throughthe Epic Embedded platform. He agrees to proceed..  My office door was closed. No one else was in the room.  He acknowledged consent and understanding of privacy and security of the video platform. The patient has agreed to participate and understands they can discontinue the visit at any time.    Patient is aware this is a billable service.       Visit Time    Visit Start Time: 1:44  Visit Stop Time: 1:59  Total Visit Duration:  15 minutes    --------  MEDICATION MANAGEMENT NOTE        [facility]      Name and Date of Birth:  Tex Hill 29 y.o. 1995 MRN: [id number]    Date of Visit: November 13, 2024    Reason for Visit: Follow-up visit regarding medication management     _____________________________    Assessment & Plan     Tex Hill is a 29 y.o. male, Single, domiciled with mother, on permanent disability, with past medical history of Vitamin D deficiency, prior psychiatric diagnoses of schizoaffective disorder;  multiple hospitalizations and two suicide attempts (most recently 2021).  Tex was personally seen and evaluated today at the [facility] outpatient clinic for follow-up regarding medication management.        On assessment, Tex Hill reports he has been doing well overall and has had stability in his symptoms. He denies feeling anxious, and feels his mood has improved since the weather has gotten cooler. He is currently walking to and from the convenience store daily, and agrees to add in 2 additional walks for exercise during the week. He reports sleep has bee stable. He has been adherent with his medication regimen, however has been struggling to take some supplements. We discussed he can find alternative forms of the vitamins over the counter if desired (such as a liquid or gummy), if this is easier for him to take. He is agreeable to look into this. He continues to decline monotherapy for antipsychotic treatment, preferring to be on both risperdal and clozapine. Provided education regarding preference of monotherapy due to side effect profile, however patient expresses preference to continue current treatment and is accepting of risks. Patient is in agreement with the treatment plan as detailed below, and agrees to call the office with any concerns or side effects between appointments.     DSM-5 Diagnoses/Visit Diagnoses:     Assessment & Plan  Schizophrenia, unspecified type (HCC)  Symptoms at goal - no reported hallucinations or talking to self with current regmin  Continuing education about adverse effects with dual antipsychotic therapy and provider preference to try monotherapy. Providing education about this at each visit. Patient continues to decline reducing risperdal in favor of optimizing clozapine  Continue clozapine 75 mg daily, with monthly CBC with diff per [facility]  Risperdal 2 mg HS       Vitamin D deficiency  Encouraged to restart vitamin D       Screening for endocrine,  nutritional, metabolic and immunity disorder    Orders:    Lipid Panel with Direct LDL reflex; Future    Hemoglobin A1C; Future    CBC and differential; Standing    Long term current use of antipsychotic medication    Orders:    Lipid Panel with Direct LDL reflex; Future    Hemoglobin A1C; Future    CBC and differential; Standing      Treatment Recommendations:  Continue psychopharmacological management as follows:  Continue risperdal 2mg HS for psychosis  Clozapine 75mg HS, for psychosis  CBC with differential every month per [facility] program   Clozapine level = 70 (low) on 12/11 on 100mg dosage  AIMS = 0 on 5/1/24 (unable to perform today due to virtual visit but no abnormal movement seen via video)  Continue vitamin D supplementation for deficiency   Fish oil supplementation for brain health and for high triglycerides   Continue daily sennakot for clozapine induced constipation  Can skip on days he has multiple loose bowel movements  Melatonin 1mg HS PRN for sleep-wake cycle regulation  TSH, CMP, vitamin D and B12 collected in August 2024, reviewed. Lipid panel and Ha1c last collected in June 2024, reviewed.   Ordering updated lipid panel and hemoglobin A1c for collection in December, to monitor on long term antipsychotic therapy  Follow up in 6 weeks for medication management  Referred previously for nutrition services due to medication induced weight gain and hyperlipidemia - has not made an appointment  Referred previously for psychological/neuropsychological testing for autism evaluation  Reached out to  navigator about [facility] referral - pt declining this, will re-evaluate interest at a later time  Referred for MRI and encouraged this to be completed for psychosis workup and head pain/pressure symptoms - pt declining this, still discussing with him at each visit to encourage him to complete this  Pt discharged from therapy due to not responding to outreach after 8/29/24  Follow up with PCP for medical issues  "and ongoing care  Aware of 24 hour and weekend coverage for urgent situations accessed by calling [facility] main practice number    Individual psychotherapy provided: No     Treatment Plan:     Completed and signed during the session: Yes - with Tex    Medications Risks/Benefits:      Risks, Benefits And Possible Side Effects Of Medications:    Risks, benefits, and possible side effects of medications explained to Tex and he verbalizes understanding and agreement for treatment.     Controlled Medication Discussion:     Not applicable    Medical Decision Making / Counseling / Coordination of Care:  The following interventions are recommended: return in 6 weeks for follow up.  Although patient's acute lethality risk is LOW, long-term/chronic lethality risk is mildly elevated given the risk factors listed above. However, at the current moment, Tex is future-oriented, forward-thinking, and demonstrates ability to act in a self-preserving manner as evidenced by volitionally seeking psychiatric evaluation and treatment today. To mitigate future risk, patient should adhere to treatment recommendations, avoid alcohol/illicit substance use, utilize community-based resources and familiar support, and prioritize mental health treatment. The diagnosis and treatment plan were reviewed with the patient. Risks, benefits, and alternatives to treatment were discussed. The importance of medication and treatment compliance was reviewed with the patient.     _____________________________________________    History of Present Illness     Chief Complaint: \"I've been feeling better since the weather is cooler\"    SUBJECTIVE:    Tex Hill is a 29 y.o. male, possessing a past psychiatric history significant for schizophrenia, who was personally seen and evaluated at the [facility] outpatient clinic virtually for follow-up regarding medication management. At their last visit, the " plan was to continue his current medication.    Tex states that since their previous psychiatric appointment with this writer, he has been stable overall and has noticed improved mood. He feels he is less stressed with the cooler weather. He is taking walks 20 minutes per day to and from convenience store. He is drinking 2 sodas per day.  We discussed cutting back on sugary drinks like soda. He denies side effects from medications. He denies head pain. Fatigue is improving. He feels his medication is effective for his symptoms (such as talking to self). He has been enjoying playing video games and watching movies. He would like to stay on his current medications.    Presently, patient denies suicidal/homicidal ideation in addition to thoughts of self-injury.  At conclusion of evaluation, patient is amenable to the recommendations of this writer including: continue psychotropic medications as prescribed.  Also, patient is amenable to calling/contacting the outpatient office including this writer if any acute adverse effects of their medication regimen arise in addition to any comments or concerns pertaining to their psychiatric management.  Patient is amenable to calling/contacting crisis and/or attending to the nearest emergency department if their clinical condition deteriorates to assure their safety and stability, stating that they are able to appropriately confide in their mother regarding their psychiatric state.    Current Rating Scores:     Current PHQ-9   PHQ-2/9 Depression Screening           Current JESSE-7 is .    Psychiatric Review Of Systems:  Unchanged information from this writer's previous assessment is copied and italicized; information that has changed is bolded.    Appetite: getting nauseated/appetite decrease  Adverse eating: no  Weight changes: reports 5-7 lb weight loss  Insomnia/sleeplessness: goes to bed at 7 or 8, and awake at 9AM, wakes up a lot throughout the night, takes 1 hr to fall  "asleep initially - unchanged  Fatigue/anergy: denies  Anhedonia/lack of interest: unchanged - watching movie every day, recently started playing [place] [employer]  Attention/concentration: denies   Psychomotor agitation/retardation: no  Somatic symptoms: no  Anxiety/panic attack: denies  Mylene/hypomania: no  Hopelessness/helplessness/worthlessness: no  Self-injurious behavior/high-risk behavior: no  Suicidal ideation: no  Homicidal ideation: no  Auditory hallucinations: denies  Visual hallucinations: no  Other perceptual disturbances: no  Delusional thinking: no  Obsessive/compulsive symptoms: no    Review Of Systems:      Constitutional negative   ENT negative   Cardiovascular negative   Respiratory negative   Gastrointestinal negative   Genitourinary negative   Musculoskeletal negative   Integumentary negative   Neurological negative   Endocrine negative   Other Symptoms none, all other systems are negative     Objective    OBJECTIVE:     Visit Vitals  Smoking Status Never      Wt Readings from Last 6 Encounters:   07/24/24 90.1 kg (198 lb 9.6 oz)        No past medical history on file.   No past surgical history on file.    Meds/Allergies    Allergies   Allergen Reactions    Gantrisin [Sulfisoxazole] Hallucinations     Took as a child and had hallucinations per mother     Current Outpatient Medications   Medication Instructions    b complex vitamins capsule 1 capsule, Oral, Daily    clozapine (CLOZARIL) 50 MG tablet TAKE 1 & 1/2 TABLETS BY MOUTH DAILY AT BEDTIME    Omega-3 Fatty Acids (Fish Oil Minis) 500 MG CAPS 2 capsules, Oral, Daily    risperiDONE (RISPERDAL) 2 mg, Oral, Daily at bedtime    senna (SENOKOT) 8.6 mg, Oral, Daily at bedtime    Vitamin D3 4,000 Units, Oral, Daily           Mental Status Exam:    Appearance age appropriate, casually dressed, seated on couch at home, in no distress   Behavior cooperative, calm   Speech normal rate, normal volume, normal pitch   Mood \"Less stressed\"   Affect blunted "   Thought Processes organized, goal directed   Associations concrete associations   Thought Content no overt delusions   Perceptual Disturbances: Does not appear to be responding to internal stimuli   Abnormal Thoughts  Risk Potential Denies suicidal or homicidal ideation, plan, or intent   Orientation oriented to person, place, time/date, and situation   Memory recent and remote memory grossly intact   Consciousness alert and awake   Attention Span Concentration Span attention span and concentration are age appropriate   Intellect appears to be of average intelligence   Insight good   Judgement good   Muscle Strength and  Gait unable to assess today due to virtual visit   Motor Activity unable to assess today due to virtual visit   Language no difficulty naming common objects, no difficulty repeating a phrase, no difficulty writing a sentence   Fund of Knowledge adequate knowledge of current events  adequate fund of knowledge regarding past history  adequate fund of knowledge regarding vocabulary      Laboratory Results: I have personally reviewed all pertinent laboratory/tests results    No visits with results within 1 Month(s) from this visit.   Latest known visit with results is:   Ancillary Orders on 10/11/2024   Component Date Value Ref Range Status    White Blood Cell Count 10/14/2024 6.4  3.4 - 10.8 x10E3/uL Final    Red Blood Cell Count 10/14/2024 5.19  4.14 - 5.80 x10E6/uL Final    Hemoglobin 10/14/2024 14.8  13.0 - 17.7 g/dL Final    HCT 10/14/2024 44.1  37.5 - 51.0 % Final    MCV 10/14/2024 85  79 - 97 fL Final    MCH 10/14/2024 28.5  26.6 - 33.0 pg Final    MCHC 10/14/2024 33.6  31.5 - 35.7 g/dL Final    RDW 10/14/2024 13.0  11.6 - 15.4 % Final    Platelet Count 10/14/2024 321  150 - 450 x10E3/uL Final    Neutrophils 10/14/2024 61  Not Estab. % Final    Lymphocytes 10/14/2024 30  Not Estab. % Final    Monocytes 10/14/2024 8  Not Estab. % Final    Eosinophils 10/14/2024 0  Not Estab. % Final    Basophils  "PCT 10/14/2024 1  Not Estab. % Final    Neutrophils (Absolute) 10/14/2024 3.9  1.4 - 7.0 x10E3/uL Final    Lymphocytes (Absolute) 10/14/2024 1.9  0.7 - 3.1 x10E3/uL Final    Monocytes (Absolute) 10/14/2024 0.5  0.1 - 0.9 x10E3/uL Final    Eosinophils (Absolute) 10/14/2024 0.0  0.0 - 0.4 x10E3/uL Final    Basophils ABS 10/14/2024 0.1  0.0 - 0.2 x10E3/uL Final    Immature Granulocytes 10/14/2024 0  Not Estab. % Final    Immature Granulocytes (Absolute) 10/14/2024 0.0  0.0 - 0.1 x10E3/uL Final             ___________________________________    History Review: The following portions of the patient's history were reviewed and updated as appropriate: allergies, current medications, past family history, past medical history, past social history, past surgical history, and problem list.    Unchanged information from this writer's previous assessment is copied and italicized; information that has changed is bolded.      Past Psychiatric History:      Past psychiatric diagnoses:   Schizophrenia, schizoaffective d/o, anxiety, depression   Inpatient psychiatric admissions:   10 times total (4 were involuntary)  First - age 22 after stealing sisters klonopin  Most common reason: first few were \"no reason\"  2017 - got invega shot was a very influential moment  2 suicide attempts/SI: SI and wanting to jump off a roof (2021) a few months later after sleeping pills, tried to hang himself 2 years ago  Psychosis 3 times (most recent for that reason was 3 years)  Most recent was 2 years ago  Has stayed out of the hospital due to \"taking his medications, not having suicidal ideation\"  Denies history of ECT, denies having had ICM in past.   Prior outpatient psychiatric treatment:   Saw Chilo Ramsey for 3 years  Saw psychiatrist at [facility] for a year before that  Past/current psychotherapy:   Worked with a therapist at [facility] Masood, stopped seeing 1-2 years ago because he left the practice  History of suicidal attempts/gestures:   2: took " "sleeping pills (August 2021), tried to hang self two years  History of non-suicidal self-injurious behavior:   None  History of violence/aggressive behaviors:   None  Psychotropic medication trials:   Invega, (discomfort) risperdal, zyprexa, thorazine (all unknown reasons for discontinuation)  Abilify (bad reaction)  Invega SCHAEFER x1 in 2019 (\"hurt his head\")  Ativan ('caused psychosis')  Loxapine - feeling too tired on dosages above 20mg  Risperdal - above 2mg causes nausea and \"hurting\" in his head  Clozapine - current medication  Melatonin - current medication  Substance abuse inpatient/outpatient rehabilitation:   Rehab in 2017 - marijuana use  Eating disorder history:   No  Other services: used to have a  but reports he does not anymore because they ran out of things to do together      Substance Abuse History:     States he was smoking a lot of marijuana for 5 years, nearly every day, stopped smoking 2019 July after he \"freaked out\" and thought \"there were people in his basement and living in his house and torturing him. \"     Denies any other recreational drug use and otherwise denies history of alcohol, illict substance, or tobacco abuse., Patient denies previous legal actions or arrests related to substance intoxication including prior DWIs/DUIs., Patient does not exhibit objective evidence of substance withdrawal during today's examination nor do they appear under the influence of any psychoactive substance.       Family Psychiatric History:      Family History   History reviewed. No pertinent family history.         Psychiatric Family History: Per patient he is adopted,  Per mother is not adopted. Maternal grandfather - schizophrenia  Tex's half sister has bipolar disorder     Social History:     Developmental: Was in special education, denies IEP  Per care everywhere, mild developmental delay, early intervention at age 2.5. Concern for autism, diagnosed with ADHD.  Has 2 sister (50 and 34 " "years old), states he was adopted but his mother reports he is not adopted  Education: high school diploma/GED  Marital history: single  Children: none  Living arrangement, social support: Family is supportive, mom is supportive. Lives with mother, on a waiting list for housing, on disability for schizoaffective disorder.   Dad lives in [place]   Occupational History: Permanent disability. 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 , Patient denies history of arrests or violence with a deadly weapon.    history: None     Traumatic History:      Abuse: Denies  Other Traumatic Events: Bullying, and would not disclose further.  ___________________________________      Lauren Stockton MD   11/13/24      "

## 2024-11-13 NOTE — BH TREATMENT PLAN
TREATMENT PLAN - Medication Management        [facility] - MENTAL HEALTH OUTPATIENT    Name and Date of Birth:  Tex Hill 29 y.o. 1995  Date of Treatment Plan: November 13, 2024  Diagnosis/Diagnoses:    1. Schizophrenia, unspecified type (HCC)    2. Vitamin D deficiency    3. Screening for endocrine, nutritional, metabolic and immunity disorder    4. Long term current use of antipsychotic medication      Strengths/Personal Resources for Self-Care: supportive family, taking medications as prescribed, ability to adapt to life changes, ability to communicate needs, ability to communicate well, ability to listen, ability to reason, ability to understand psychiatric illness    Area/Areas of need: anxiety symptoms, schizophrenia symptoms    Long Term Goal: maintain stability of psychotic symptoms  Target Date: 6 months - May 13, 2025  Person/Persons responsible for completion of goal: Tex and Lauren Stockton MD     Short Term Objective (s) - How will we reach this goal?:   Take medications as prescribed  Attend psychiatry appointments regularly  Practice coping skills  Eat a healthy diet   Take walks regularly  Increase weekly walks to add an additional 20 minutes of walking  Reduce soda intake  Target Date: 6 months - May 13, 2025  Person/Persons Responsible for Completion of Goal: Tex     Progress Towards Goals: Continuing treatment    Treatment Modality: medication management every 1-3 months as needed  Review due 180 days from date of this plan: May 12, 2025   Expected length of service: Ongoing treatment    My physician and I have developed this plan together, and I agree to work on the goals and objectives. I understand the treatment goals that were developed for my treatment.    The treatment plan was created between Lauren Stockton MD and Tex Hill on 11/13/24 at 1:58 PM.

## 2024-11-13 NOTE — PATIENT INSTRUCTIONS
We can start getting blood work monthly! Next due around December 9th  With next blood work, also get lipid panel and hemoglobin a1c

## 2024-11-13 NOTE — ASSESSMENT & PLAN NOTE
Symptoms at goal - no reported hallucinations or talking to self with current regmin  Continuing education about adverse effects with dual antipsychotic therapy and provider preference to try monotherapy. Providing education about this at each visit. Patient continues to decline reducing risperdal in favor of optimizing clozapine  Continue clozapine 75 mg daily, with monthly CBC with diff per [facility]  Risperdal 2 mg HS

## 2024-11-13 NOTE — TELEPHONE ENCOUNTER
Mother called and asked that the labs ordered today be faxed to [address] fax number [phone]

## 2024-11-13 NOTE — TELEPHONE ENCOUNTER
Please fax the lab slips as requested to [facility] [address] [place] [address]. FAX # [phone]:

## 2024-11-22 DIAGNOSIS — F20.9 SCHIZOPHRENIA, UNSPECIFIED TYPE (HCC): ICD-10-CM

## 2024-11-22 RX ORDER — CLOZAPINE 50 MG/1
TABLET ORAL
Qty: 45 TABLET | Refills: 2 | Status: SHIPPED | OUTPATIENT
Start: 2024-11-22

## 2024-12-09 ENCOUNTER — TELEPHONE (OUTPATIENT)
Age: 29
End: 2024-12-09

## 2024-12-09 NOTE — TELEPHONE ENCOUNTER
Patients mother Nela called to switch the patients appt for 1/8/2025 to in office per request of the patient. Writer checked the providers schedule to make sure she is in office that day before switching it. Providers schedule shows her in office this day. Writer switched the patients appt to in office from [employer].

## 2024-12-31 NOTE — PSYCH
MEDICATION MANAGEMENT NOTE      [facility]   [address] [place] [address],[facility]       Name and Date of Birth:  Tex Hill 29 y.o. 1995 MRN: [id number]    Date of Visit: December 31, 2024    Reason for Visit: Follow-up visit regarding medication management     _____________________________    Assessment & Plan   Tex Hill is a 29 y.o. male, Single, domiciled with mother, on permanent disability, with past medical history of Vitamin D deficiency, prior psychiatric diagnoses of schizophrenia ; multiple hospitalizations and two suicide attempts (most recently 2021).  Tex was personally seen and evaluated today at the [facility] outpatient clinic for follow-up regarding medication management.       On assessment, Tex Hill continues to show improvement of psychosis symptoms, but struggles with pushing himself out of his comfort zone and motivation. He tries to leave the house most days to purchase a coffee or other beverage, but otherwise spending most of the day at home. He declines clubhouse referral, declines to complete psychological testing, nutrition referral, or MRI. He continues to decline medication reduction to monotherapy, as he does not wish to make any changes to his current regimen. Continue to provide education regarding increased risk of side effects with dual antipsychotic therapy, and Tex accepts these risks. Tex is not taking any supplements, but is open to taking smaller capsules of vitamin D if able, so will try to prescribe this today. Also informed him that there are liquid forms of vitamins over the counter he can look into and he expressed interest in looking for these. Patient is in agreement with the treatment plan as detailed below, and agrees to call the office with any concerns or side effects between appointments.     DSM-5 Diagnoses/Visit Diagnoses:     Assessment & Plan  Schizophrenia,  unspecified type (HCC)    Orders:    risperiDONE (RisperDAL) 2 mg tablet; Take 1 tablet (2 mg total) by mouth daily at bedtime    Drug-induced constipation    Orders:    senna (SENOKOT) 8.6 mg; Take 1 tablet (8.6 mg total) by mouth daily at bedtime    Vitamin D deficiency    Orders:    cholecalciferol (VITAMIN D3) 1,000 units tablet; Take 4 tablets (4,000 Units total) by mouth daily      Treatment Recommendations/Precautions:  Continue psychopharmacological management as follows:  Continue risperdal 2mg HS for psychosis  Clozapine 75mg HS, for psychosis  CBC with differential every month per REMS program   Clozapine level = 70 (low) on 12/11 on 100mg dosage  AIMS = 0 on 1/8/25  Continue vitamin D supplementation for deficiency   Resending as smaller capsules due to him stating the 2000 IU is too large  Discontinue fish oil and vitamin B complex due to non-adherence  Continue daily sennakot for clozapine induced constipation  Can skip on days he has multiple loose bowel movements  Melatonin 1mg HS PRN for sleep-wake cycle regulation  Labs most recently obtained December 2024, reviewed. (Ha1c, lipid panel, CBC)  Follow up in 2 months for medication management  Discussed upcoming transfer of care  Follow up with PCP for medical issues and ongoing care  Referred previously for nutrition services due to medication induced weight gain and hyperlipidemia - has not made an appointment  Referred previously for psychological/neuropsychological testing for autism evaluation - has not made appointment  Reached out to  navigator about [facility] referral - referral closed due to patient declining  Referred for MRI and encouraged this to be completed for psychosis workup and head pain/pressure symptoms - pt declining this, still discussing with him at each visit to encourage him to complete this  Pt discharged from therapy due to not responding to outreach after 8/29/24  Aware of 24 hour and weekend coverage for urgent  "situations accessed by calling [facility] main practice number    Individual psychotherapy provided: No     Treatment Plan:     Completed and signed during the session: Not applicable - Treatment Plan not due at this session    Medications Risks/Benefits:      Risks, Benefits And Possible Side Effects Of Medications:    Risks, benefits, and possible side effects of medications explained to Tex and he verbalizes understanding and agreement for treatment.     Controlled Medication Discussion:     Not applicable    Medical Decision Making / Counseling / Coordination of Care:  The following interventions are recommended: return in 2 months for follow up or sooner if needed.  Although patient's acute lethality risk is LOW, long-term/chronic lethality risk is mildly elevated given the risk factors listed above. However, at the current moment, Tex is future-oriented, forward-thinking, and demonstrates ability to act in a self-preserving manner as evidenced by volitionally seeking psychiatric evaluation and treatment today. To mitigate future risk, patient should adhere to treatment recommendations, avoid alcohol/illicit substance use, utilize community-based resources and familiar support, and prioritize mental health treatment. The diagnosis and treatment plan were reviewed with the patient. Risks, benefits, and alternatives to treatment were discussed. The importance of medication and treatment compliance was reviewed with the patient.     _____________________________________________    History of Present Illness     Chief Complaint: \"I feel stressed out\"    SUBJECTIVE:    Tex Hill is a 29 y.o. male, possessing a past psychiatric history significant for schizophrenia disorder, who was personally seen and evaluated at the [facility] outpatient clinic  for follow-up regarding medication management.  At their last visit, the plan was to continue current " medications.    Tex states that since their previous psychiatric appointment with this writer, he is feeling more stressed out because he is worrying about events that occurred 6 years ago at his high school graduation. He has been watching anime most of the day, has only been leaving the house to purchase food, but does this on a daily basis. He continues to decline many treatment recommendations, but is taking clozapine, risperdal, and sennakot. He reports regular bowel movement and denies cigarette smoking. He denies talking to himself, hearing voices, and paranoia at present. He denies suicidal thoughts.    Presently, patient denies suicidal/homicidal ideation in addition to thoughts of self-injury.  At conclusion of evaluation, patient is amenable to the recommendations of this writer including: continue psychotropic medications as prescribed.  Also, patient is amenable to calling/contacting the outpatient office including this writer if any acute adverse effects of their medication regimen arise in addition to any comments or concerns pertaining to their psychiatric management.  Patient is amenable to calling/contacting crisis and/or attending to the nearest emergency department if their clinical condition deteriorates to assure their safety and stability, stating that they are able to appropriately confide in their mother regarding their psychiatric state.    Current Rating Scores:     None completed today.    Psychiatric Review Of Systems:  Unchanged information from this writer's previous assessment is copied and italicized; information that has changed is bolded.    Appetite: denies  Adverse eating: no  Weight changes: reports 5-7 lb weight loss  Insomnia/sleeplessness: denies difficulty sleeping  Fatigue/anergy: denies  Anhedonia/lack of interest: unchanged - watching anime recently  Attention/concentration: denies   Psychomotor agitation/retardation: no  Somatic symptoms: no  Anxiety/panic attack:  "denies  Mylene/hypomania: no  Hopelessness/helplessness/worthlessness: no  Self-injurious behavior/high-risk behavior: no  Suicidal ideation: no  Homicidal ideation: no  Auditory hallucinations: denies  Visual hallucinations: no  Other perceptual disturbances: no  Delusional thinking: no  Obsessive/compulsive symptoms: no    Review Of Systems:      Constitutional negative   ENT negative   Cardiovascular negative   Respiratory negative   Gastrointestinal negative   Genitourinary negative   Musculoskeletal negative   Integumentary negative   Neurological negative   Endocrine negative   Other Symptoms none, all other systems are negative     Objective    OBJECTIVE:     Visit Vitals  Smoking Status Never      Wt Readings from Last 6 Encounters:   07/24/24 90.1 kg (198 lb 9.6 oz)        No past medical history on file.   No past surgical history on file.      Mental Status Exam:    Appearance age appropriate, casually dressed, overweight, wearing winter coat, seated, intermittent fleeting eye contact   Behavior cooperative, calm   Speech normal rate, normal volume, normal pitch   Mood \"Stressed\"   Affect blunted   Thought Processes goal directed   Associations concrete associations   Thought Content no overt delusions   Perceptual Disturbances: Denies auditory or visual hallucinations and Does not appear to be responding to internal stimuli   Abnormal Thoughts  Risk Potential Denies suicidal or homicidal ideation, plan, or intent   Orientation oriented to person, place, time/date, and situation   Memory recent and remote memory grossly intact   Consciousness alert and awake   Attention Span Concentration Span attention span and concentration are age appropriate   Intellect appears to be of average intelligence   Insight fair   Judgement fair   Muscle Strength and  Gait normal muscle strength and normal muscle tone, normal gait and normal balance   Motor Activity no abnormal movements   Language no difficulty naming common " objects, no difficulty repeating a phrase, no difficulty writing a sentence   Fund of Knowledge adequate knowledge of current events  adequate fund of knowledge regarding past history  adequate fund of knowledge regarding vocabulary        Meds/Allergies    Allergies   Allergen Reactions    Gantrisin [Sulfisoxazole] Hallucinations     Took as a child and had hallucinations per mother     Current Outpatient Medications   Medication Instructions    b complex vitamins capsule 1 capsule, Oral, Daily    clozapine (CLOZARIL) 50 MG tablet TAKE 1 & 1/2 TABLETS BY MOUTH DAILY AT BEDTIME    Omega-3 Fatty Acids (Fish Oil Minis) 500 MG CAPS 2 capsules, Oral, Daily    risperiDONE (RISPERDAL) 2 mg, Oral, Daily at bedtime    senna (SENOKOT) 8.6 mg, Oral, Daily at bedtime    Vitamin D3 4,000 Units, Oral, Daily         Laboratory Results: I have personally reviewed all pertinent laboratory/tests results    Ancillary Orders on 10/11/2024   Component Date Value Ref Range Status    White Blood Cell Count 10/14/2024 6.4  3.4 - 10.8 x10E3/uL Final    Red Blood Cell Count 10/14/2024 5.19  4.14 - 5.80 x10E6/uL Final    Hemoglobin 10/14/2024 14.8  13.0 - 17.7 g/dL Final    HCT 10/14/2024 44.1  37.5 - 51.0 % Final    MCV 10/14/2024 85  79 - 97 fL Final    MCH 10/14/2024 28.5  26.6 - 33.0 pg Final    MCHC 10/14/2024 33.6  31.5 - 35.7 g/dL Final    RDW 10/14/2024 13.0  11.6 - 15.4 % Final    Platelet Count 10/14/2024 321  150 - 450 x10E3/uL Final    Neutrophils 10/14/2024 61  Not Estab. % Final    Lymphocytes 10/14/2024 30  Not Estab. % Final    Monocytes 10/14/2024 8  Not Estab. % Final    Eosinophils 10/14/2024 0  Not Estab. % Final    Basophils PCT 10/14/2024 1  Not Estab. % Final    Neutrophils (Absolute) 10/14/2024 3.9  1.4 - 7.0 x10E3/uL Final    Lymphocytes (Absolute) 10/14/2024 1.9  0.7 - 3.1 x10E3/uL Final    Monocytes (Absolute) 10/14/2024 0.5  0.1 - 0.9 x10E3/uL Final    Eosinophils (Absolute) 10/14/2024 0.0  0.0 - 0.4 x10E3/uL  Final    Basophils ABS 10/14/2024 0.1  0.0 - 0.2 x10E3/uL Final    Immature Granulocytes 10/14/2024 0  Not Estab. % Final    Immature Granulocytes (Absolute) 10/14/2024 0.0  0.0 - 0.1 x10E3/uL Final   Ancillary Orders on 09/27/2024   Component Date Value Ref Range Status    White Blood Cell Count 10/01/2024 7.4  3.4 - 10.8 x10E3/uL Final    Red Blood Cell Count 10/01/2024 5.17  4.14 - 5.80 x10E6/uL Final    Hemoglobin 10/01/2024 15.2  13.0 - 17.7 g/dL Final    HCT 10/01/2024 44.7  37.5 - 51.0 % Final    MCV 10/01/2024 87  79 - 97 fL Final    MCH 10/01/2024 29.4  26.6 - 33.0 pg Final    MCHC 10/01/2024 34.0  31.5 - 35.7 g/dL Final    RDW 10/01/2024 13.1  11.6 - 15.4 % Final    Platelet Count 10/01/2024 321  150 - 450 x10E3/uL Final    Neutrophils 10/01/2024 62  Not Estab. % Final    Lymphocytes 10/01/2024 28  Not Estab. % Final    Monocytes 10/01/2024 9  Not Estab. % Final    Eosinophils 10/01/2024 0  Not Estab. % Final    Basophils PCT 10/01/2024 1  Not Estab. % Final    Neutrophils (Absolute) 10/01/2024 4.5  1.4 - 7.0 x10E3/uL Final    Lymphocytes (Absolute) 10/01/2024 2.1  0.7 - 3.1 x10E3/uL Final    Monocytes (Absolute) 10/01/2024 0.7  0.1 - 0.9 x10E3/uL Final    Eosinophils (Absolute) 10/01/2024 0.0  0.0 - 0.4 x10E3/uL Final    Basophils ABS 10/01/2024 0.1  0.0 - 0.2 x10E3/uL Final    Immature Granulocytes 10/01/2024 0  Not Estab. % Final    Immature Granulocytes (Absolute) 10/01/2024 0.0  0.0 - 0.1 x10E3/uL Final   Ancillary Orders on 09/13/2024   Component Date Value Ref Range Status    White Blood Cell Count 10/28/2024 6.7  3.4 - 10.8 x10E3/uL Final    Red Blood Cell Count 10/28/2024 5.22  4.14 - 5.80 x10E6/uL Final    Hemoglobin 10/28/2024 15.2  13.0 - 17.7 g/dL Final    HCT 10/28/2024 44.9  37.5 - 51.0 % Final    MCV 10/28/2024 86  79 - 97 fL Final    MCH 10/28/2024 29.1  26.6 - 33.0 pg Final    MCHC 10/28/2024 33.9  31.5 - 35.7 g/dL Final    RDW 10/28/2024 13.2  11.6 - 15.4 % Final    Platelet Count  10/28/2024 305  150 - 450 x10E3/uL Final    Neutrophils 10/28/2024 59  Not Estab. % Final    Lymphocytes 10/28/2024 31  Not Estab. % Final    Monocytes 10/28/2024 9  Not Estab. % Final    Eosinophils 10/28/2024 0  Not Estab. % Final    Basophils PCT 10/28/2024 1  Not Estab. % Final    Neutrophils (Absolute) 10/28/2024 4.0  1.4 - 7.0 x10E3/uL Final    Lymphocytes (Absolute) 10/28/2024 2.1  0.7 - 3.1 x10E3/uL Final    Monocytes (Absolute) 10/28/2024 0.6  0.1 - 0.9 x10E3/uL Final    Eosinophils (Absolute) 10/28/2024 0.0  0.0 - 0.4 x10E3/uL Final    Basophils ABS 10/28/2024 0.1  0.0 - 0.2 x10E3/uL Final    Immature Granulocytes 10/28/2024 0  Not Estab. % Final    Immature Granulocytes (Absolute) 10/28/2024 0.0  0.0 - 0.1 x10E3/uL Final   Ancillary Orders on 08/30/2024   Component Date Value Ref Range Status    White Blood Cell Count 09/03/2024 7.1  3.4 - 10.8 x10E3/uL Final    Red Blood Cell Count 09/03/2024 4.82  4.14 - 5.80 x10E6/uL Final    Hemoglobin 09/03/2024 13.9  13.0 - 17.7 g/dL Final    HCT 09/03/2024 41.0  37.5 - 51.0 % Final    MCV 09/03/2024 85  79 - 97 fL Final    MCH 09/03/2024 28.8  26.6 - 33.0 pg Final    MCHC 09/03/2024 33.9  31.5 - 35.7 g/dL Final    RDW 09/03/2024 13.1  11.6 - 15.4 % Final    Platelet Count 09/03/2024 277  150 - 450 x10E3/uL Final    Neutrophils 09/03/2024 65  Not Estab. % Final    Lymphocytes 09/03/2024 25  Not Estab. % Final    Monocytes 09/03/2024 9  Not Estab. % Final    Eosinophils 09/03/2024 0  Not Estab. % Final    Basophils PCT 09/03/2024 1  Not Estab. % Final    Neutrophils (Absolute) 09/03/2024 4.5  1.4 - 7.0 x10E3/uL Final    Lymphocytes (Absolute) 09/03/2024 1.8  0.7 - 3.1 x10E3/uL Final    Monocytes (Absolute) 09/03/2024 0.7  0.1 - 0.9 x10E3/uL Final    Eosinophils (Absolute) 09/03/2024 0.0  0.0 - 0.4 x10E3/uL Final    Basophils ABS 09/03/2024 0.1  0.0 - 0.2 x10E3/uL Final    Immature Granulocytes 09/03/2024 0  Not Estab. % Final    Immature Granulocytes (Absolute)  09/03/2024 0.0  0.0 - 0.1 x10E3/uL Final   Ancillary Orders on 08/16/2024   Component Date Value Ref Range Status    White Blood Cell Count 08/19/2024 6.7  3.4 - 10.8 x10E3/uL Final    Red Blood Cell Count 08/19/2024 5.09  4.14 - 5.80 x10E6/uL Final    Hemoglobin 08/19/2024 14.8  13.0 - 17.7 g/dL Final    HCT 08/19/2024 44.1  37.5 - 51.0 % Final    MCV 08/19/2024 87  79 - 97 fL Final    MCH 08/19/2024 29.1  26.6 - 33.0 pg Final    MCHC 08/19/2024 33.6  31.5 - 35.7 g/dL Final    RDW 08/19/2024 13.2  11.6 - 15.4 % Final    Platelet Count 08/19/2024 295  150 - 450 x10E3/uL Final    Neutrophils 08/19/2024 62  Not Estab. % Final    Lymphocytes 08/19/2024 28  Not Estab. % Final    Monocytes 08/19/2024 9  Not Estab. % Final    Eosinophils 08/19/2024 0  Not Estab. % Final    Basophils PCT 08/19/2024 1  Not Estab. % Final    Neutrophils (Absolute) 08/19/2024 4.2  1.4 - 7.0 x10E3/uL Final    Lymphocytes (Absolute) 08/19/2024 1.9  0.7 - 3.1 x10E3/uL Final    Monocytes (Absolute) 08/19/2024 0.6  0.1 - 0.9 x10E3/uL Final    Eosinophils (Absolute) 08/19/2024 0.0  0.0 - 0.4 x10E3/uL Final    Basophils ABS 08/19/2024 0.1  0.0 - 0.2 x10E3/uL Final    Immature Granulocytes 08/19/2024 0  Not Estab. % Final    Immature Granulocytes (Absolute) 08/19/2024 0.0  0.0 - 0.1 x10E3/uL Final   Ancillary Orders on 08/02/2024   Component Date Value Ref Range Status    White Blood Cell Count 08/05/2024 6.8  3.4 - 10.8 x10E3/uL Final    Red Blood Cell Count 08/05/2024 5.15  4.14 - 5.80 x10E6/uL Final    Hemoglobin 08/05/2024 14.8  13.0 - 17.7 g/dL Final    HCT 08/05/2024 44.3  37.5 - 51.0 % Final    MCV 08/05/2024 86  79 - 97 fL Final    MCH 08/05/2024 28.7  26.6 - 33.0 pg Final    MCHC 08/05/2024 33.4  31.5 - 35.7 g/dL Final    RDW 08/05/2024 13.2  11.6 - 15.4 % Final    Platelet Count 08/05/2024 271  150 - 450 x10E3/uL Final    Neutrophils 08/05/2024 61  Not Estab. % Final    Lymphocytes 08/05/2024 27  Not Estab. % Final    Monocytes 08/05/2024  10  Not Estab. % Final    Eosinophils 08/05/2024 0  Not Estab. % Final    Basophils PCT 08/05/2024 1  Not Estab. % Final    Neutrophils (Absolute) 08/05/2024 4.1  1.4 - 7.0 x10E3/uL Final    Lymphocytes (Absolute) 08/05/2024 1.8  0.7 - 3.1 x10E3/uL Final    Monocytes (Absolute) 08/05/2024 0.7  0.1 - 0.9 x10E3/uL Final    Eosinophils (Absolute) 08/05/2024 0.0  0.0 - 0.4 x10E3/uL Final    Basophils ABS 08/05/2024 0.1  0.0 - 0.2 x10E3/uL Final    Immature Granulocytes 08/05/2024 1  Not Estab. % Final    Immature Granulocytes (Absolute) 08/05/2024 0.0  0.0 - 0.1 x10E3/uL Final   Office Visit on 07/24/2024   Component Date Value Ref Range Status    TSH 08/05/2024 2.320  0.450 - 4.500 uIU/mL Final    Glucose, Random 08/05/2024 88  70 - 99 mg/dL Final    BUN 08/05/2024 11  6 - 20 mg/dL Final    Creatinine 08/05/2024 0.98  0.76 - 1.27 mg/dL Final    eGFR 08/05/2024 108  >59 mL/min/1.73 Final    SL AMB BUN/CREATININE RATIO 08/05/2024 11  9 - 20 Final    Sodium 08/05/2024 140  134 - 144 mmol/L Final    Potassium 08/05/2024 4.4  3.5 - 5.2 mmol/L Final    Chloride 08/05/2024 101  96 - 106 mmol/L Final    CO2 08/05/2024 23  20 - 29 mmol/L Final    CALCIUM 08/05/2024 9.5  8.7 - 10.2 mg/dL Final    Protein, Total 08/05/2024 7.0  6.0 - 8.5 g/dL Final    Albumin 08/05/2024 4.5  4.3 - 5.2 g/dL Final    Globulin, Total 08/05/2024 2.5  1.5 - 4.5 g/dL Final    TOTAL BILIRUBIN 08/05/2024 0.5  0.0 - 1.2 mg/dL Final    Alk Phos Isoenzymes 08/05/2024 119  44 - 121 IU/L Final    AST 08/05/2024 26  0 - 40 IU/L Final    ALT 08/05/2024 35  0 - 44 IU/L Final    25-HYDROXY VIT D 08/05/2024 17.0 (L)  30.0 - 100.0 ng/mL Final    Comment: Vitamin D deficiency has been defined by the [place] of  Medicine and an Endocrine Society practice guideline as a  level of serum 25-OH vitamin D less than 20 ng/mL (1,2).  The Endocrine Society went on to further define vitamin D  insufficiency as a level between 21 and 29 ng/mL (2).  1. IOM ([place] of  [employer]). 2010. Dietary reference     intakes for calcium and D. [place]: The     National Academies Press.  2. Iwona MF, Yaritza NC, Chandan HERNANDEZ, et al.     Evaluation, treatment, and prevention of vitamin D     deficiency: an Endocrine Society clinical practice     guideline. JCEM. 2011 Jul; 96(6):1911-30.      Vitamin B-12 08/05/2024 442  232 - 1,245 pg/mL Final   Ancillary Orders on 07/19/2024   Component Date Value Ref Range Status    White Blood Cell Count 07/22/2024 6.2  3.4 - 10.8 x10E3/uL Final    Red Blood Cell Count 07/22/2024 4.89  4.14 - 5.80 x10E6/uL Final    Hemoglobin 07/22/2024 14.2  13.0 - 17.7 g/dL Final    HCT 07/22/2024 41.1  37.5 - 51.0 % Final    MCV 07/22/2024 84  79 - 97 fL Final    MCH 07/22/2024 29.0  26.6 - 33.0 pg Final    MCHC 07/22/2024 34.5  31.5 - 35.7 g/dL Final    RDW 07/22/2024 13.0  11.6 - 15.4 % Final    Platelet Count 07/22/2024 275  150 - 450 x10E3/uL Final    Neutrophils 07/22/2024 65  Not Estab. % Final    Lymphocytes 07/22/2024 26  Not Estab. % Final    Monocytes 07/22/2024 8  Not Estab. % Final    Eosinophils 07/22/2024 0  Not Estab. % Final    Basophils PCT 07/22/2024 1  Not Estab. % Final    Neutrophils (Absolute) 07/22/2024 4.1  1.4 - 7.0 x10E3/uL Final    Lymphocytes (Absolute) 07/22/2024 1.6  0.7 - 3.1 x10E3/uL Final    Monocytes (Absolute) 07/22/2024 0.5  0.1 - 0.9 x10E3/uL Final    Eosinophils (Absolute) 07/22/2024 0.0  0.0 - 0.4 x10E3/uL Final    Basophils ABS 07/22/2024 0.1  0.0 - 0.2 x10E3/uL Final    Immature Granulocytes 07/22/2024 0  Not Estab. % Final    Immature Granulocytes (Absolute) 07/22/2024 0.0  0.0 - 0.1 x10E3/uL Final             ___________________________________    History Review: The following portions of the patient's history were reviewed and updated as appropriate: allergies, current medications, past family history, past medical history, past social history, past surgical history, and problem list.    Unchanged information  "from this writer's previous assessment is copied and italicized; information that has changed is bolded.    Past Psychiatric History:      Past psychiatric diagnoses:   Schizophrenia, schizoaffective d/o, anxiety, depression   Inpatient psychiatric admissions:   10 times total (4 were involuntary)  First - age 22 after stealing sisters klonopin  Most common reason: first few were \"no reason\"  2017 - got invega shot was a very influential moment  2 suicide attempts/SI: SI and wanting to jump off a roof (2021) a few months later after sleeping pills, tried to hang himself 2 years ago  Psychosis 3 times (most recent for that reason was 3 years)  Most recent was 2 years ago  Has stayed out of the hospital due to \"taking his medications, not having suicidal ideation\"  Denies history of ECT, denies having had ICM in past.   Prior outpatient psychiatric treatment:   Saw Chilo Ramsey for 3 years  Saw psychiatrist at [facility] for a year before that  Past/current psychotherapy:   Worked with a therapist at [facility] Masood, stopped seeing 1-2 years ago because he left the practice  History of suicidal attempts/gestures:   2: took sleeping pills (August 2021), tried to hang self two years  History of non-suicidal self-injurious behavior:   None  History of violence/aggressive behaviors:   None  Psychotropic medication trials:   Invega, (discomfort) risperdal, zyprexa, thorazine (all unknown reasons for discontinuation)  Abilify (bad reaction)  Invega SCHAEFER x1 in 2019 (\"hurt his head\")  Ativan ('caused psychosis')  Loxapine - feeling too tired on dosages above 20mg  Risperdal - above 2mg causes nausea and \"hurting\" in his head  Clozapine - current medication  Melatonin - current medication  Substance abuse inpatient/outpatient rehabilitation:   Rehab in 2017 - marijuana use  Eating disorder history:   No  Other services: used to have a  but reports he does not anymore because they ran out of things to do together    " "  Substance Abuse History:     States he was smoking a lot of marijuana for 5 years, nearly every day, stopped smoking 2019 July after he \"freaked out\" and thought \"there were people in his basement and living in his house and torturing him. \"     Denies any other recreational drug use and otherwise denies history of alcohol, illict substance, or tobacco abuse., Patient denies previous legal actions or arrests related to substance intoxication including prior DWIs/DUIs., Patient does not exhibit objective evidence of substance withdrawal during today's examination nor do they appear under the influence of any psychoactive substance.       Family Psychiatric History:      Family History   History reviewed. No pertinent family history.         Psychiatric Family History: Per patient he is adopted,  Per mother is not adopted. Maternal grandfather - schizophrenia  Tex's half sister has bipolar disorder     Social History:     Developmental: Was in special education, denies IEP  Per care everywhere, mild developmental delay, early intervention at age 2.5. Concern for autism, diagnosed with ADHD.  Has 2 sister (50 and 34 years old), states he was adopted but his mother reports he is not adopted  Education: high school diploma/GED  Marital history: single  Children: none  Living arrangement, social support: Family is supportive, mom is supportive. Lives with mother, on a waiting list for housing, on disability for schizoaffective disorder.   Dad lives in [place]   Occupational History: Permanent disability. 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 , Patient denies history of arrests or violence with a deadly weapon.    history: None     Traumatic History:      Abuse: Denies  Other Traumatic Events: Bullying, " and would not disclose further.  ___________________________________      Visit Time    Visit Start Time: 1:45  Visit Stop Time: 2:05  Total Visit Duration:  20 minutes    Lauren Stockton MD   12/31/24

## 2025-01-08 ENCOUNTER — OFFICE VISIT (OUTPATIENT)
Dept: PSYCHIATRY | Facility: CLINIC | Age: 30
End: 2025-01-08

## 2025-01-08 DIAGNOSIS — E55.9 VITAMIN D DEFICIENCY: Primary | ICD-10-CM

## 2025-01-08 DIAGNOSIS — F20.9 SCHIZOPHRENIA, UNSPECIFIED TYPE (HCC): ICD-10-CM

## 2025-01-08 DIAGNOSIS — K59.03 DRUG-INDUCED CONSTIPATION: ICD-10-CM

## 2025-01-08 PROCEDURE — PBNCHG PB NO CHARGE PLACEHOLDER: Performed by: PSYCHIATRY & NEUROLOGY

## 2025-01-08 RX ORDER — CHOLECALCIFEROL (VITAMIN D3) 25 MCG
4000 TABLET ORAL DAILY
Qty: 120 TABLET | Refills: 2 | Status: SHIPPED | OUTPATIENT
Start: 2025-01-08 | End: 2025-04-08

## 2025-01-08 RX ORDER — RISPERIDONE 2 MG/1
2 TABLET ORAL
Qty: 30 TABLET | Refills: 2 | Status: SHIPPED | OUTPATIENT
Start: 2025-01-08

## 2025-01-08 RX ORDER — SENNOSIDES 8.6 MG
8.6 TABLET ORAL
Qty: 30 TABLET | Refills: 2 | Status: SHIPPED | OUTPATIENT
Start: 2025-01-08

## 2025-01-08 NOTE — ASSESSMENT & PLAN NOTE
Orders:    cholecalciferol (VITAMIN D3) 1,000 units tablet; Take 4 tablets (4,000 Units total) by mouth daily

## 2025-01-08 NOTE — ASSESSMENT & PLAN NOTE
Orders:    risperiDONE (RisperDAL) 2 mg tablet; Take 1 tablet (2 mg total) by mouth daily at bedtime

## 2025-01-08 NOTE — PATIENT INSTRUCTIONS
Please call the office nursing staff for medication issues including refills, problems getting medications, bothersome side effects, etc at [phone]      Please return for a follow up appointment as discussed and arrive approximately 15 minutes prior to your appointment time. If you are running late or are unable to attend your appointment, please call our office at [phone]     If you have thoughts of harming yourself or are otherwise in psychological crisis, do not hesitate to contact your County Crisis hotline, or 931 or go to the nearest emergency room.  [facility] Crisis: [phone]  [facility] Crisis: [phone]  [facility] Crisis: [phone]  [facility] Crisis: [phone]  [facility] Crisis: [phone]  [facility] Crisis: [phone]  [facility] Crisis: [phone]  National Suicide Prevention Hotline: 1-962.887.1778 or call 046

## 2025-01-10 ENCOUNTER — TELEPHONE (OUTPATIENT)
Dept: OTHER | Facility: HOSPITAL | Age: 30
End: 2025-01-10

## 2025-01-10 NOTE — TELEPHONE ENCOUNTER
Patients mom called for refill of risperiDONE (RisperDAL) 2 mg E-Prescribing Status: Receipt confirmed by pharmacy (1/8/2025  2:01 PM EST)tablet stating pharmacy said it was refused  I see two request one was refused for duplication   Patients mom to call pharmacy to verify

## 2025-02-04 ENCOUNTER — TELEPHONE (OUTPATIENT)
Dept: PSYCHIATRY | Facility: CLINIC | Age: 30
End: 2025-02-04

## 2025-02-04 ENCOUNTER — DOCUMENTATION (OUTPATIENT)
Dept: PSYCHIATRY | Facility: CLINIC | Age: 30
End: 2025-02-04

## 2025-02-04 NOTE — TELEPHONE ENCOUNTER
Mom called for refill on clozapine (CLOZARIL) 50 MG   Advised last ordered 11/22/24 #45 with 2 refills. She only  one. She will check with pharmacy to see if refill available on last order.

## 2025-02-04 NOTE — PROGRESS NOTES
RN obtained lab results from [facility] and entered the absolute neutrophil count into clozapine REMs as needed for pharmacy to fill clozapine prescription.

## 2025-02-17 DIAGNOSIS — F20.9 SCHIZOPHRENIA, UNSPECIFIED TYPE (HCC): ICD-10-CM

## 2025-02-17 RX ORDER — CLOZAPINE 50 MG/1
75 TABLET ORAL
Qty: 45 TABLET | Refills: 1 | Status: SHIPPED | OUTPATIENT
Start: 2025-02-17

## 2025-02-17 NOTE — TELEPHONE ENCOUNTER
Reason for call:   [x] Refill   [] Prior Auth  [] Other:     Office:   [] PCP/Provider -   [x] Specialty/Provider - psych     Medication: clozapine     Dose/Frequency: 50 mg take 1 and a half tablets daily at bedtime     Quantity: 45    Pharmacy: [facility] on [address]     Does the patient have enough for 3 days?   [x] Yes   [] No - Send as HP to POD

## 2025-03-04 LAB
BASOPHILS # BLD AUTO: 0.1 X10E3/UL (ref 0–0.2)
BASOPHILS NFR BLD AUTO: 1 %
EOSINOPHIL # BLD AUTO: 0 X10E3/UL (ref 0–0.4)
EOSINOPHIL NFR BLD AUTO: 0 %
ERYTHROCYTE [DISTWIDTH] IN BLOOD BY AUTOMATED COUNT: 13 % (ref 11.6–15.4)
HCT VFR BLD AUTO: 42.6 % (ref 37.5–51)
HGB BLD-MCNC: 14.2 G/DL (ref 13–17.7)
IMM GRANULOCYTES # BLD: 0 X10E3/UL (ref 0–0.1)
IMM GRANULOCYTES NFR BLD: 0 %
LYMPHOCYTES # BLD AUTO: 1.8 X10E3/UL (ref 0.7–3.1)
LYMPHOCYTES NFR BLD AUTO: 28 %
MCH RBC QN AUTO: 28.1 PG (ref 26.6–33)
MCHC RBC AUTO-ENTMCNC: 33.3 G/DL (ref 31.5–35.7)
MCV RBC AUTO: 84 FL (ref 79–97)
MONOCYTES # BLD AUTO: 0.6 X10E3/UL (ref 0.1–0.9)
MONOCYTES NFR BLD AUTO: 9 %
NEUTROPHILS # BLD AUTO: 4.2 X10E3/UL (ref 1.4–7)
NEUTROPHILS NFR BLD AUTO: 62 %
PLATELET # BLD AUTO: 314 X10E3/UL (ref 150–450)
RBC # BLD AUTO: 5.05 X10E6/UL (ref 4.14–5.8)
WBC # BLD AUTO: 6.7 X10E3/UL (ref 3.4–10.8)

## 2025-03-05 ENCOUNTER — OFFICE VISIT (OUTPATIENT)
Dept: PSYCHIATRY | Facility: CLINIC | Age: 30
End: 2025-03-05

## 2025-03-05 ENCOUNTER — DOCUMENTATION (OUTPATIENT)
Dept: PSYCHIATRY | Facility: CLINIC | Age: 30
End: 2025-03-05

## 2025-03-05 DIAGNOSIS — K59.03 DRUG-INDUCED CONSTIPATION: ICD-10-CM

## 2025-03-05 DIAGNOSIS — F20.9 SCHIZOPHRENIA, UNSPECIFIED TYPE (HCC): Primary | ICD-10-CM

## 2025-03-05 PROCEDURE — NC001 PR NO CHARGE: Performed by: STUDENT IN AN ORGANIZED HEALTH CARE EDUCATION/TRAINING PROGRAM

## 2025-03-05 RX ORDER — CLOZAPINE 50 MG/1
75 TABLET ORAL
Qty: 45 TABLET | Refills: 2 | Status: SHIPPED | OUTPATIENT
Start: 2025-03-05

## 2025-03-05 RX ORDER — SENNOSIDES 8.6 MG
8.6 TABLET ORAL
Qty: 30 TABLET | Refills: 2 | Status: SHIPPED | OUTPATIENT
Start: 2025-03-05

## 2025-03-05 RX ORDER — RISPERIDONE 2 MG/1
2 TABLET ORAL
Qty: 30 TABLET | Refills: 2 | Status: SHIPPED | OUTPATIENT
Start: 2025-03-05

## 2025-03-05 NOTE — PROGRESS NOTES
Absolute neutrophil count from 3/3/25 entered into clozapine REMs for pharmacy retrieval to fill clozapine prescription.

## 2025-03-05 NOTE — ASSESSMENT & PLAN NOTE
Symptoms at goal. Pt declines to consolidate regmen to single antipsychotic therapy, and prefers to be on both risperdal and clozapine. He has had significant improvement on this regimen. Continue monthly CBCs.   Orders:    clozapine (CLOZARIL) 50 MG tablet; Take 1.5 tablets (75 mg total) by mouth daily at bedtime    risperiDONE (RisperDAL) 2 mg tablet; Take 1 tablet (2 mg total) by mouth daily at bedtime

## 2025-03-05 NOTE — PATIENT INSTRUCTIONS
------------------------------------------------------------------  Sleep Hygiene Tips for Improving Insomnia:    1.   Sleep in a dark, quiet environment with comfortable bedding. Use light-blocking curtains to darken your room if needed, and consider using ear plugs and/or a white noise machine if you cannot control the noise in your environment.     2.   Set a sleep schedule so that you get up at the same time seven days a week. Avoid sleeping in for more than an hour on days off.    3.   Avoid caffeine in the afternoon, particularly 6-8 hours before bedtime.  Considering cutting down on caffeine by decreasing by one caffeinated beverage every 3 days until you are no longer consuming caffeine.    4.   Exercise regularly, but try to avoid vigorous exercise within 2 hours of bedtime.     5.   Avoid smoking near bedtime.    6.   Avoid alcohol before bed.  If you consume one alcoholic beverage, allow 3 hours between that drink and bedtime.  If you consume two alcoholic beverages, allow 5 hours between those drinks and bedtime.  Alcohol may lead to early waking or waking in the middle of the night.    7.   Set aside time to wind down in the evening without electronics.  Try deep breathing, mindfulness meditation, or other relaxation techniques. Free sleep meditations can be found online, e.g. [email or web address]-[email or web address]/sleep    8.   Use your bedroom for sleep only.  Do not watch TV or use electronics with a screen (computer, phone, tablet etc.) in bed.    9.   Turn the clock away so you cannot see it in bed.    10. Only go to bed when you are feeling sleepy. Otherwise, do something relaxing without electronics in another room (reading a magazine article, [employer] with a deck of cards).    11. Stimulus Control:  If you are lying in bed for 15-20 minutes (estimated because the clock is turned away so you cannot see it) and you are not asleep, get up and do something relaxing in a different room.  Do this in the  "middle of the night as well if you are awake.  Avoid doing work or getting on the computer/electronics with screens.    12. Avoid napping except for driving safety.  If you feel too sleepy to drive, do not drive.  If you get sleepy while driving, pull over and nap.  You may resume driving once you feel alert.    13. Read \"No More Sleepless Nights\" by Claude Cerda PhD.        Learn more at https://www.sleepfoundation.org/sleep-hygiene      ------------------------------------------------------------------   "

## 2025-03-05 NOTE — PSYCH
MEDICATION MANAGEMENT NOTE      [facility]   [address] [facility],Critical access hospital       Name and Date of Birth:  Tex Hill 29 y.o. 1995 MRN: [id number]    Date of Visit: March 5, 2025    Reason for Visit: Follow-up visit regarding medication management     _____________________________    Assessment & Plan   eTx Hill is a 29 y.o. male, Single, domiciled with mother, on permanent disability, with past medical history of Vitamin D deficiency, prior psychiatric diagnoses of schizophrenia ; multiple hospitalizations and two suicide attempts (most recently 2021).  Tex was personally seen and evaluated today at the [facility] outpatient clinic for follow-up regarding medication management.       On assessment, Tex Hill reports he has been doing well overall in terms of his mood and mental health symptoms. He denies current symptoms of psychosis including auditory hallucinations or delusions, denies thoughts to harm himself or others. He reports feeling less anxious than he used to feel. Engaged in some reality testing today regarding delusional thought content in the past. He is open to seeing a psychologist for autism evaluation if it is at [facility] so will reach out for this service. At this time he is adherent with his medication with no side effects and with apparent benefit so will continue for now. Recommending sleep hygiene changes for better sleep. Patient is in agreement with the treatment plan as detailed below, and agrees to call the office with any concerns or side effects between appointments.  Patient is amenable to calling/contacting crisis and/or attending to the nearest emergency department if their clinical condition deteriorates to assure their safety and stability.    DSM-5 Diagnoses/Visit Diagnoses:     Assessment & Plan  Schizophrenia, unspecified type (HCC)  Symptoms at goal. Pt declines to  consolidate regmen to single antipsychotic therapy, and prefers to be on both risperdal and clozapine. He has had significant improvement on this regimen. Continue monthly CBCs.   Orders:    clozapine (CLOZARIL) 50 MG tablet; Take 1.5 tablets (75 mg total) by mouth daily at bedtime    risperiDONE (RisperDAL) 2 mg tablet; Take 1 tablet (2 mg total) by mouth daily at bedtime    Drug-induced constipation    Orders:    senna (SENOKOT) 8.6 mg; Take 1 tablet (8.6 mg total) by mouth daily at bedtime      Treatment Recommendations/Precautions:  Continue psychopharmacological management as follows:  Continue risperdal 2mg HS for psychosis  Clozapine 75mg HS, for psychosis  CBC with differential every month   Clozapine level = 70 (low) on 12/11 on 100mg dosage  AIMS = 0 on 1/8/25  Continue vitamin D supplementation for deficiency   Resending as smaller capsules due to him stating the 2000 IU is too large  Discontinue fish oil and vitamin B complex due to non-adherence  Continue daily sennakot for clozapine induced constipation  Can skip on days he has multiple loose bowel movements  Melatonin 1mg HS PRN for sleep-wake cycle regulation  Labs most recently obtained March 2025, reviewed. Lipid panel and Ha1c last obtained in December 2024, due June 2025  Referred previously for nutrition services due to medication induced weight gain and hyperlipidemia - has not made an appointment  Referred previously for psychological/neuropsychological testing for autism evaluation - has not made appointment. Today is agreeable for this writer to reach out for testing at [place] location  Reached out to  navigator about [facility] referral - referral closed due to patient declining  Referred for MRI and encouraged this to be completed for psychosis workup and head pain/pressure symptoms - pt declining this, still discussing with him at each visit to encourage him to complete this  Pt discharged from therapy due to not responding to  outreach after 8/29/24  Recommend sleep hygiene changes  Follow up in 3 months for medication management  Discussed upcoming transfer in July 2025  Follow up with PCP for medical issues and ongoing care  Aware of 24 hour and weekend coverage for urgent situations accessed by calling [facility] Outpatient main practice number    Individual psychotherapy provided: No     Treatment Plan:     Completed and signed during the session: Not applicable - Treatment Plan not due at this session      Medications Risks/Benefits:      Risks, Benefits And Possible Side Effects Of Medications:    Risks, benefits, and possible side effects of medications explained to Tex and he verbalizes understanding and agreement for treatment.     Controlled Medication Discussion:     Not applicable    Medical Decision Making / Counseling / Coordination of Care:  The following interventions are recommended: return in 3 months for follow up or sooner if needed.  Although patient's acute lethality risk is low, long-term/chronic lethality risk is mildly elevated given the risk factors listed above. However, at the current moment, Tex is future-oriented, forward-thinking, and demonstrates ability to act in a self-preserving manner as evidenced by volitionally seeking psychiatric evaluation and treatment today. To mitigate future risk, patient should adhere to treatment recommendations, avoid alcohol/illicit substance use, utilize community-based resources and familiar support, and prioritize mental health treatment. The diagnosis and treatment recommendations were reviewed with the patient. Risks, benefits, and alternatives to treatment were discussed. The importance of medication and treatment compliance was reviewed with the patient. Patient is amenable to calling/contacting the outpatient office including this writer if any acute adverse effects of their medication regimen arise in addition to any comments or  "concerns pertaining to their psychiatric management. Patient is amenable to calling/contacting crisis and/or attending to the nearest emergency department if their clinical condition deteriorates to assure their safety and stability.    _____________________________________________    History of Present Illness     Chief Complaint: \"I'm good with how things are\"    SUBJECTIVE:    Tex Hill is a 29 y.o. male, possessing a past psychiatric history significant for schizophrenia, who was personally seen and evaluated at the [facility] outpatient clinic  for follow-up regarding medication management.  At their last visit, the plan was to continue current medications.    Tex states that since their previous psychiatric appointment with this writer, he reports he is doing \"good\". He has been watching anime and playing video games.  He reports sleep has been more interrupted.  3-5 times per night, it takes up to 30 minutes to fall back asleep. Goes to bed at 7PM, and wakes at 9AM.  He has been drinking less caffiene, drinking two teas per day, latest is 4 or 5 PM. We discussed sleep hygiene techniques and reducing time in bed as he is currently in bed for 14 hours per day. Discussed no caffeine after 1 PM. He expresses understanding to why these interventions are recommended. He denies side effects with his medication and has been adherent with risperdal and clozapine. He reports regular bowel movements and uses senna when needed. He denies smoking. He denies hearing voices, denies suicidal ideation or homicidal ideation. He endorses some distress regarding thoughts about events that happened in high school. Was able to engage in reality testing today and we discussed acceptance of his current situation and mindfulness principles of trying to stay in the present and avoid ruminating about thoughts of the past.     Current Rating Scores:     Current PHQ-9   PHQ-2/9 Depression " "Screening           Current JESSE-7 is .    Psychiatric Review Of Systems:  Unchanged information from this writer's previous assessment is copied and italicized; information that has changed is bolded.    Appetite: denies  Adverse eating: no  Weight changes: none reported  Insomnia/sleeplessness: denies difficulty sleeping  Fatigue/anergy: denies  Anhedonia/lack of interest: unchanged - watching anime recently, wants to buy a new video game  Attention/concentration: denies   Psychomotor agitation/retardation: no  Somatic symptoms: no  Anxiety/panic attack: denies  Mylene/hypomania: no  Hopelessness/helplessness/worthlessness: no  Self-injurious behavior/high-risk behavior: no  Suicidal ideation: no  Homicidal ideation: no  Auditory hallucinations: denies  Visual hallucinations: no  Other perceptual disturbances: no  Delusional thinking: no  Obsessive/compulsive symptoms: no    Review Of Systems:      Constitutional negative   ENT negative   Cardiovascular negative   Respiratory negative   Gastrointestinal negative   Genitourinary negative   Musculoskeletal negative   Integumentary negative   Neurological negative   Endocrine negative   Other Symptoms none, all other systems are negative     Objective    OBJECTIVE:     Visit Vitals  Smoking Status Never      Wt Readings from Last 6 Encounters:   07/24/24 90.1 kg (198 lb 9.6 oz)        No past medical history on file.   No past surgical history on file.      Mental Status Exam:    Appearance age appropriate, casually dressed, appropriate grooming and hygiene, seated comfortably, intermittent limited eye contact   Behavior cooperative, calm   Speech normal volume, normal pitch, scant   Mood \"Good\"   Affect blunted   Thought Processes organized, goal directed   Associations concrete associations   Thought Content no overt delusions, though does report thinking about past delusions    Perceptual Disturbances: Denies auditory or visual hallucinations and Does not appear to " be responding to internal stimuli   Abnormal Thoughts  Risk Potential Denies suicidal or homicidal ideation, plan, or intent   Orientation oriented to person, place, time/date, and situation   Memory recent and remote memory grossly intact   Consciousness alert and awake   Attention Span Concentration Span attention span and concentration are age appropriate   Intellect appears to be of average intelligence   Insight fair   Judgement fair   Muscle Strength and  Gait normal muscle strength and normal muscle tone, normal gait and normal balance   Motor Activity no abnormal movements   Language no difficulty naming common objects, no difficulty repeating a phrase, no difficulty writing a sentence   Fund of Knowledge adequate knowledge of current events  adequate fund of knowledge regarding past history  adequate fund of knowledge regarding vocabulary        Meds/Allergies    Allergies   Allergen Reactions    Gantrisin [Sulfisoxazole] Hallucinations     Took as a child and had hallucinations per mother     Current Outpatient Medications   Medication Instructions    cholecalciferol (VITAMIN D3) 4,000 Units, Oral, Daily    clozapine (CLOZARIL) 75 mg, Oral, Daily at bedtime    risperiDONE (RISPERDAL) 2 mg, Oral, Daily at bedtime    senna (SENOKOT) 8.6 mg, Oral, Daily at bedtime         Laboratory Results: I have personally reviewed all pertinent laboratory/tests results    Ancillary Orders on 10/11/2024   Component Date Value Ref Range Status    White Blood Cell Count 10/14/2024 6.4  3.4 - 10.8 x10E3/uL Final    Red Blood Cell Count 10/14/2024 5.19  4.14 - 5.80 x10E6/uL Final    Hemoglobin 10/14/2024 14.8  13.0 - 17.7 g/dL Final    HCT 10/14/2024 44.1  37.5 - 51.0 % Final    MCV 10/14/2024 85  79 - 97 fL Final    MCH 10/14/2024 28.5  26.6 - 33.0 pg Final    MCHC 10/14/2024 33.6  31.5 - 35.7 g/dL Final    RDW 10/14/2024 13.0  11.6 - 15.4 % Final    Platelet Count 10/14/2024 321  150 - 450 x10E3/uL Final    Neutrophils  10/14/2024 61  Not Estab. % Final    Lymphocytes 10/14/2024 30  Not Estab. % Final    Monocytes 10/14/2024 8  Not Estab. % Final    Eosinophils 10/14/2024 0  Not Estab. % Final    Basophils PCT 10/14/2024 1  Not Estab. % Final    Neutrophils (Absolute) 10/14/2024 3.9  1.4 - 7.0 x10E3/uL Final    Lymphocytes (Absolute) 10/14/2024 1.9  0.7 - 3.1 x10E3/uL Final    Monocytes (Absolute) 10/14/2024 0.5  0.1 - 0.9 x10E3/uL Final    Eosinophils (Absolute) 10/14/2024 0.0  0.0 - 0.4 x10E3/uL Final    Basophils ABS 10/14/2024 0.1  0.0 - 0.2 x10E3/uL Final    Immature Granulocytes 10/14/2024 0  Not Estab. % Final    Immature Granulocytes (Absolute) 10/14/2024 0.0  0.0 - 0.1 x10E3/uL Final   Ancillary Orders on 09/27/2024   Component Date Value Ref Range Status    White Blood Cell Count 10/01/2024 7.4  3.4 - 10.8 x10E3/uL Final    Red Blood Cell Count 10/01/2024 5.17  4.14 - 5.80 x10E6/uL Final    Hemoglobin 10/01/2024 15.2  13.0 - 17.7 g/dL Final    HCT 10/01/2024 44.7  37.5 - 51.0 % Final    MCV 10/01/2024 87  79 - 97 fL Final    MCH 10/01/2024 29.4  26.6 - 33.0 pg Final    MCHC 10/01/2024 34.0  31.5 - 35.7 g/dL Final    RDW 10/01/2024 13.1  11.6 - 15.4 % Final    Platelet Count 10/01/2024 321  150 - 450 x10E3/uL Final    Neutrophils 10/01/2024 62  Not Estab. % Final    Lymphocytes 10/01/2024 28  Not Estab. % Final    Monocytes 10/01/2024 9  Not Estab. % Final    Eosinophils 10/01/2024 0  Not Estab. % Final    Basophils PCT 10/01/2024 1  Not Estab. % Final    Neutrophils (Absolute) 10/01/2024 4.5  1.4 - 7.0 x10E3/uL Final    Lymphocytes (Absolute) 10/01/2024 2.1  0.7 - 3.1 x10E3/uL Final    Monocytes (Absolute) 10/01/2024 0.7  0.1 - 0.9 x10E3/uL Final    Eosinophils (Absolute) 10/01/2024 0.0  0.0 - 0.4 x10E3/uL Final    Basophils ABS 10/01/2024 0.1  0.0 - 0.2 x10E3/uL Final    Immature Granulocytes 10/01/2024 0  Not Estab. % Final    Immature Granulocytes (Absolute) 10/01/2024 0.0  0.0 - 0.1 x10E3/uL Final   Ancillary Orders  "on 09/13/2024   Component Date Value Ref Range Status    White Blood Cell Count 10/28/2024 6.7  3.4 - 10.8 x10E3/uL Final    Red Blood Cell Count 10/28/2024 5.22  4.14 - 5.80 x10E6/uL Final    Hemoglobin 10/28/2024 15.2  13.0 - 17.7 g/dL Final    HCT 10/28/2024 44.9  37.5 - 51.0 % Final    MCV 10/28/2024 86  79 - 97 fL Final    MCH 10/28/2024 29.1  26.6 - 33.0 pg Final    MCHC 10/28/2024 33.9  31.5 - 35.7 g/dL Final    RDW 10/28/2024 13.2  11.6 - 15.4 % Final    Platelet Count 10/28/2024 305  150 - 450 x10E3/uL Final    Neutrophils 10/28/2024 59  Not Estab. % Final    Lymphocytes 10/28/2024 31  Not Estab. % Final    Monocytes 10/28/2024 9  Not Estab. % Final    Eosinophils 10/28/2024 0  Not Estab. % Final    Basophils PCT 10/28/2024 1  Not Estab. % Final    Neutrophils (Absolute) 10/28/2024 4.0  1.4 - 7.0 x10E3/uL Final    Lymphocytes (Absolute) 10/28/2024 2.1  0.7 - 3.1 x10E3/uL Final    Monocytes (Absolute) 10/28/2024 0.6  0.1 - 0.9 x10E3/uL Final    Eosinophils (Absolute) 10/28/2024 0.0  0.0 - 0.4 x10E3/uL Final    Basophils ABS 10/28/2024 0.1  0.0 - 0.2 x10E3/uL Final    Immature Granulocytes 10/28/2024 0  Not Estab. % Final    Immature Granulocytes (Absolute) 10/28/2024 0.0  0.0 - 0.1 x10E3/uL Final             ___________________________________    History Review: The following portions of the patient's history were reviewed and updated as appropriate: allergies, current medications, past family history, past medical history, past social history, past surgical history, and problem list.    Unchanged information from this writer's previous assessment is copied and italicized; information that has changed is bolded.    Past Psychiatric History:      Past psychiatric diagnoses:   Schizophrenia, schizoaffective d/o, anxiety, depression   Inpatient psychiatric admissions:   10 times total (4 were involuntary)  First - age 22 after stealing sisters klonopin  Most common reason: first few were \"no reason\"  2017 - got " "invega shot was a very influential moment  2 suicide attempts/SI: SI and wanting to jump off a roof (2021) a few months later after sleeping pills, tried to hang himself 2 years ago  Psychosis 3 times (most recent for that reason was 3 years)  Most recent was 2 years ago  Has stayed out of the hospital due to \"taking his medications, not having suicidal ideation\"  Denies history of ECT, denies having had ICM in past.   Prior outpatient psychiatric treatment:   Saw Chilo Ramsey for 3 years  Saw psychiatrist at [facility] for a year before that  Past/current psychotherapy:   Worked with a therapist at [facility], stopped seeing 1-2 years ago because he left the practice  History of suicidal attempts/gestures:   2: took sleeping pills (August 2021), tried to hang self two years  History of non-suicidal self-injurious behavior:   None  History of violence/aggressive behaviors:   None  Psychotropic medication trials:   Invega, (discomfort) risperdal, zyprexa, thorazine (all unknown reasons for discontinuation)  Abilify (bad reaction)  Invega SCHAEFER x1 in 2019 (\"hurt his head\")  Ativan ('caused psychosis')  Loxapine - feeling too tired on dosages above 20mg  Risperdal - above 2mg causes nausea and \"hurting\" in his head  Clozapine - current medication  Melatonin - current medication  Substance abuse inpatient/outpatient rehabilitation:   Rehab in 2017 - marijuana use  Eating disorder history:   No  Other services: used to have a  but reports he does not anymore because they ran out of things to do together      Substance Abuse History:     States he was smoking a lot of marijuana for 5 years, nearly every day, stopped smoking 2019 July after he \"freaked out\" and thought \"there were people in his basement and living in his house and torturing him. \"     Denies any other recreational drug use and otherwise denies history of alcohol, illict substance, or tobacco abuse., Patient denies previous legal actions or arrests " "related to substance intoxication including prior DWIs/DUIs., Patient does not exhibit objective evidence of substance withdrawal during today's examination nor do they appear under the influence of any psychoactive substance.       Family Psychiatric History:      Family History   History reviewed. No pertinent family history.         Psychiatric Family History: Per patient he is adopted,  Per mother is not adopted. Maternal grandfather - schizophrenia  Tex's half sister has bipolar disorder     Social History:     Developmental: Was in special education, denies IEP  Per care everywhere, mild developmental delay, early intervention at age 2.5. Concern for autism, diagnosed with ADHD.  Has 2 sister (50 and 34 years old), states he was adopted but his mother reports he is not adopted  Education: high school diploma/GED  Marital history: single  Children: none  Living arrangement, social support: Family is supportive, mom is supportive. Lives with mother, on a waiting list for housing, on disability for schizoaffective disorder.   Dad lives in [place]   Occupational History: Permanent disability. 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 , Patient denies history of arrests or violence with a deadly weapon.    history: None     Traumatic History:      Abuse: Denies  Other Traumatic Events: Bullying, and would not disclose further.  ___________________________________      Visit Time    Visit Start Time: 12:53  Visit Stop Time: 1:20  Total Visit Duration:  27 minutes    Lauren Stockton MD   03/05/25    "

## 2025-05-06 LAB
BASOPHILS # BLD AUTO: 0.1 X10E3/UL (ref 0–0.2)
BASOPHILS NFR BLD AUTO: 1 %
EOSINOPHIL # BLD AUTO: 0.2 X10E3/UL (ref 0–0.4)
EOSINOPHIL NFR BLD AUTO: 2 %
ERYTHROCYTE [DISTWIDTH] IN BLOOD BY AUTOMATED COUNT: 12.9 % (ref 11.6–15.4)
HCT VFR BLD AUTO: 43.1 % (ref 37.5–51)
HGB BLD-MCNC: 14.6 G/DL (ref 13–17.7)
IMM GRANULOCYTES # BLD: 0 X10E3/UL (ref 0–0.1)
IMM GRANULOCYTES NFR BLD: 0 %
LYMPHOCYTES # BLD AUTO: 2.1 X10E3/UL (ref 0.7–3.1)
LYMPHOCYTES NFR BLD AUTO: 28 %
MCH RBC QN AUTO: 29 PG (ref 26.6–33)
MCHC RBC AUTO-ENTMCNC: 33.9 G/DL (ref 31.5–35.7)
MCV RBC AUTO: 86 FL (ref 79–97)
MONOCYTES # BLD AUTO: 0.5 X10E3/UL (ref 0.1–0.9)
MONOCYTES NFR BLD AUTO: 7 %
NEUTROPHILS # BLD AUTO: 4.4 X10E3/UL (ref 1.4–7)
NEUTROPHILS NFR BLD AUTO: 62 %
PLATELET # BLD AUTO: 286 X10E3/UL (ref 150–450)
RBC # BLD AUTO: 5.04 X10E6/UL (ref 4.14–5.8)
WBC # BLD AUTO: 7.3 X10E3/UL (ref 3.4–10.8)

## 2025-06-03 LAB
BASOPHILS # BLD AUTO: 0 X10E3/UL (ref 0–0.2)
BASOPHILS NFR BLD AUTO: 0 %
EOSINOPHIL # BLD AUTO: 0 X10E3/UL (ref 0–0.4)
EOSINOPHIL NFR BLD AUTO: 0 %
ERYTHROCYTE [DISTWIDTH] IN BLOOD BY AUTOMATED COUNT: 13.2 % (ref 11.6–15.4)
HCT VFR BLD AUTO: 43.9 % (ref 37.5–51)
HGB BLD-MCNC: 14.9 G/DL (ref 13–17.7)
IMM GRANULOCYTES # BLD: 0 X10E3/UL (ref 0–0.1)
IMM GRANULOCYTES NFR BLD: 0 %
LYMPHOCYTES # BLD AUTO: 1.9 X10E3/UL (ref 0.7–3.1)
LYMPHOCYTES NFR BLD AUTO: 28 %
MCH RBC QN AUTO: 29.7 PG (ref 26.6–33)
MCHC RBC AUTO-ENTMCNC: 33.9 G/DL (ref 31.5–35.7)
MCV RBC AUTO: 88 FL (ref 79–97)
MONOCYTES # BLD AUTO: 0.5 X10E3/UL (ref 0.1–0.9)
MONOCYTES NFR BLD AUTO: 8 %
NEUTROPHILS # BLD AUTO: 4.4 X10E3/UL (ref 1.4–7)
NEUTROPHILS NFR BLD AUTO: 64 %
PLATELET # BLD AUTO: 282 X10E3/UL (ref 150–450)
RBC # BLD AUTO: 5.01 X10E6/UL (ref 4.14–5.8)
WBC # BLD AUTO: 6.8 X10E3/UL (ref 3.4–10.8)

## 2025-06-04 ENCOUNTER — OFFICE VISIT (OUTPATIENT)
Dept: PSYCHIATRY | Facility: CLINIC | Age: 30
End: 2025-06-04

## 2025-06-04 ENCOUNTER — TELEPHONE (OUTPATIENT)
Age: 30
End: 2025-06-04

## 2025-06-04 DIAGNOSIS — Z79.899 LONG TERM CURRENT USE OF CLOZAPINE: ICD-10-CM

## 2025-06-04 DIAGNOSIS — E78.00 HIGH CHOLESTEROL: ICD-10-CM

## 2025-06-04 DIAGNOSIS — T50.905A WEIGHT GAIN DUE TO MEDICATION: ICD-10-CM

## 2025-06-04 DIAGNOSIS — K59.03 DRUG-INDUCED CONSTIPATION: ICD-10-CM

## 2025-06-04 DIAGNOSIS — E55.9 VITAMIN D DEFICIENCY: ICD-10-CM

## 2025-06-04 DIAGNOSIS — Z13.0 SCREENING FOR ENDOCRINE, NUTRITIONAL, METABOLIC AND IMMUNITY DISORDER: ICD-10-CM

## 2025-06-04 DIAGNOSIS — R68.89 SUSPECTED AUTISM DISORDER: ICD-10-CM

## 2025-06-04 DIAGNOSIS — Z13.29 SCREENING FOR ENDOCRINE, NUTRITIONAL, METABOLIC AND IMMUNITY DISORDER: ICD-10-CM

## 2025-06-04 DIAGNOSIS — F20.9 SCHIZOPHRENIA, UNSPECIFIED TYPE (HCC): Primary | ICD-10-CM

## 2025-06-04 DIAGNOSIS — Z13.21 SCREENING FOR ENDOCRINE, NUTRITIONAL, METABOLIC AND IMMUNITY DISORDER: ICD-10-CM

## 2025-06-04 DIAGNOSIS — Z13.228 SCREENING FOR ENDOCRINE, NUTRITIONAL, METABOLIC AND IMMUNITY DISORDER: ICD-10-CM

## 2025-06-04 DIAGNOSIS — Z79.899 LONG TERM CURRENT USE OF ANTIPSYCHOTIC MEDICATION: ICD-10-CM

## 2025-06-04 DIAGNOSIS — R63.5 WEIGHT GAIN DUE TO MEDICATION: ICD-10-CM

## 2025-06-04 PROCEDURE — PBNCHG PB NO CHARGE PLACEHOLDER: Performed by: PSYCHIATRY & NEUROLOGY

## 2025-06-04 RX ORDER — CLOZAPINE 50 MG/1
75 TABLET ORAL
Qty: 45 TABLET | Refills: 1 | Status: SHIPPED | OUTPATIENT
Start: 2025-06-04

## 2025-06-04 RX ORDER — CHOLECALCIFEROL (VITAMIN D3) 25 MCG
4000 TABLET ORAL DAILY
Qty: 120 TABLET | Refills: 1 | Status: SHIPPED | OUTPATIENT
Start: 2025-06-04 | End: 2025-08-03

## 2025-06-04 RX ORDER — RISPERIDONE 2 MG/1
2 TABLET ORAL
Qty: 30 TABLET | Refills: 1 | Status: SHIPPED | OUTPATIENT
Start: 2025-06-04

## 2025-06-04 RX ORDER — SENNOSIDES 8.6 MG
8.6 TABLET ORAL
Qty: 30 TABLET | Refills: 1 | Status: SHIPPED | OUTPATIENT
Start: 2025-06-04

## 2025-06-04 NOTE — TELEPHONE ENCOUNTER
----- Message from Lauren Stockton MD sent at 6/4/2025  1:19 PM EDT -----  Regarding: Therapy referral  Good afternoon,I would like to refer this patient for therapy for trauma, at [facility], operated by [facility]. Please reach out with any questions, thank you!

## 2025-06-04 NOTE — BH TREATMENT PLAN
"TREATMENT PLAN (Medication Management Only)        [facility]    Name and Date of Birth:  Tex Hill 30 y.o. 1995  MRN: [id number]  Date of Treatment Plan: June 4, 2025  Diagnosis/Diagnoses:    1. Screening for endocrine, nutritional, metabolic and immunity disorder    2. Long term current use of antipsychotic medication    3. Long term current use of clozapine    4. Weight gain due to medication    5. High cholesterol    6. Vitamin D deficiency    7. Schizophrenia, unspecified type (HCC)    8. Drug-induced constipation      Strengths/Personal Resources for Self-Care: supportive family, taking medications as prescribed, ability to adapt to life changes, ability to communicate needs, ability to communicate well, ability to listen, ability to reason, family ties.  Area/Areas of need (in own words): \"anger\", schizophrenia symptoms  1. Long Term Goal:   \"Reduce anger towards people who were mean to me\"  Target Date:6 months - 12/4/2025  Person/Persons responsible for completion of goal: Tex  2.  Short Term Objective (s) - How will we reach this goal?:   A.  Provider new recommended medication/dosage changes and/or continue medication(s): continue current medications as prescribed.  B.  Referral for individual therapy  C.  Continue 1-2 walks per day.  Target Date:6 months - 12/4/2025  Person/Persons Responsible for Completion of Goal: Tex  Progress Towards Goals: continuing treatment  Treatment Modality: medication management every 1-3 months  Review due 180 days from date of this plan: 6 months - 12/4/2025  Expected length of service: ongoing treatment unless revised  My Physician/PA/NP and I have developed this plan together and I agree to work on the goals and objectives. I understand the treatment goals that were developed for my treatment.   Electronic Signatures: on file (unless signed below)    Lauren Stockton MD 06/04/25  "

## 2025-06-04 NOTE — PATIENT INSTRUCTIONS
Please contact Weight management at [phone] to schedule your appointment to discuss weight gain    You are being referred for psychological testing for autism  You are being referred for individual therapy    Please sign your crisis plan and treatment plan in your [employer] account    We will continue current medications  Please get updated bloodwork each month. You are due for a cholesterol panel and hemoglobin A1c, this was ordered    Please await a phone call regarding scheduling transfer appointment with Dr. Solorio

## 2025-06-04 NOTE — PSYCH
MEDICATION MANAGEMENT NOTE      [facility]   [address] [place] [address],[facility]       Name and Date of Birth:  Tex Hill 30 y.o. 1995 MRN: [id number]    Date of Visit: June 4, 2025    Reason for Visit: Follow-up visit regarding medication management     _____________________________    Assessment & Plan   Tex Hill is a 30 y.o. male, Single, domiciled with mother, on permanent disability, with past medical history of Vitamin D deficiency, prior psychiatric diagnoses of schizophrenia ; multiple hospitalizations and two suicide attempts (most recently 2021).  Tex was personally seen and evaluated today at the [facility] outpatient clinic for follow-up regarding medication management.       On assessment, Tex Hill reports ongoing stability of his mental health symptoms, denies recurrence of hallucinations, denies thoughts to harm self or others. He is slightly more agreeable today, willing to see psychologist for autism testing, weight management to discuss weight gain due to clozapine, and expresses interest in seeing a therapist as well. He is adherent to his medication and does not wish to make changes. Patient is in agreement with the treatment plan as detailed below, and agrees to call the office with any concerns or side effects between appointments.  Patient is amenable to calling/contacting crisis and/or attending to the nearest emergency department if their clinical condition deteriorates to assure their safety and stability.    DSM-5 Diagnoses/Visit Diagnoses:     Assessment & Plan  Screening for endocrine, nutritional, metabolic and immunity disorder    Orders:    Lipid Panel with Direct LDL reflex; Future    Hemoglobin A1C; Future    Long term current use of antipsychotic medication    Orders:    Lipid Panel with Direct LDL reflex; Future    Hemoglobin A1C; Future    Long term current use of clozapine    Orders:     Lipid Panel with Direct LDL reflex; Future    Hemoglobin A1C; Future    Weight gain due to medication  Referral to weight management clinic  Orders:    Ambulatory Referral to Weight Management; Future    High cholesterol      Orders:    Ambulatory Referral to Weight Management; Future     Vitamin D deficiency      Orders:    cholecalciferol (VITAMIN D3) 1,000 units tablet; Take 4 tablets (4,000 Units total) by mouth daily     Schizophrenia, unspecified type (HCC)  Symptoms at goal. Pt declines to consolidate regmen to single antipsychotic therapy, and prefers to be on both risperdal and clozapine. He has had significant improvement on this regimen. Continue monthly CBCs.     Orders:    clozapine (CLOZARIL) 50 MG tablet; Take 1.5 tablets (75 mg total) by mouth daily at bedtime    risperiDONE (RisperDAL) 2 mg tablet; Take 1 tablet (2 mg total) by mouth daily at bedtime     Drug-induced constipation      Orders:    senna (SENOKOT) 8.6 mg; Take 1 tablet (8.6 mg total) by mouth daily at bedtime     Suspected autism disorder  Re-referring for [facility] testing at [facility]  Orders:    Ambulatory referral to Psych Services; Future      Treatment Recommendations/Precautions:  Continue psychopharmacological management as follows:  Continue risperdal 2mg HS for psychosis due to patient preference for dual therapy   Clozapine 75mg HS, for psychosis  CBC with differential every month   Clozapine level = 70 (low) on 12/11 on 100mg dosage  AIMS = 0 on 1/8/25  Continue vitamin D supplementation for deficiency   Resending as smaller capsules due to him stating the 2000 IU is too large  Discontinue fish oil and vitamin B complex due to non-adherence  Continue daily sennakot for clozapine induced constipation  Can skip on days he has multiple loose bowel movements  Melatonin 1mg HS PRN for sleep-wake cycle regulation  Labs most recently obtained June 2025, reviewed. Lipid panel and Ha1c last obtained in December  2024, due June 2025 ordered today  Referral to weight management   Referred previously for psychological/neuropsychological testing for autism evaluation - re-placed referral today  Reached out to  navigator about [facility] referral - referral closed due to patient declining  Referred for MRI and encouraged this to be completed for psychosis workup and head pain/pressure symptoms - pt declining this, still discussing with him at each visit to encourage him to complete this  Pt discharged from therapy due to not responding to outreach after 8/29/24, desires to be re-referred today  Follow up in 6 weeks for medication management  Follow up with PCP for medical issues and ongoing care  Aware of 24 hour and weekend coverage for urgent situations accessed by calling [facility] main practice number    Individual psychotherapy provided: No     Treatment Plan:     Completed and signed during the session: Yes - with Tex      Medications Risks/Benefits:      Risks, Benefits And Possible Side Effects Of Medications:    Risks, benefits, and possible side effects of medications explained to Tex and he verbalizes understanding and agreement for treatment.     Controlled Medication Discussion:     Not applicable    Suicide/Homicide Risk Assessment:    Risk of Harm to Self:  The following ratings are based on assessment at the time of the interview  Recent Specific Risk Factors include: mental illness diagnosis  Protective Factors: no current suicidal ideation, ability to adapt to change, access to mental health treatment, compliant with medications, compliant with mental health treatment, having a desire to be alive, having a sense of purpose or meaning in life, stable living environment, strong relationships, supportive family  Based on today's assessment, Tex presents the following risk of harm to self: minimal    Risk of Harm to Others:  The following ratings are based on  assessment at the time of the interview  Recent Specific Risk Factors include: none.  Protective Factors: no current homicidal ideation, ability to adapt to change, access to mental health treatment, compliant with medications, compliant with mental health treatment, safe and stable living environment, strong relationships, supportive family  Based on today's assessment, Tex presents the following risk of harm to others: none    The following interventions are recommended: continue medication management. Although patient's acute lethality risk is LOW, long-term/chronic lethality risk is mildly elevated given see above., However, at the current moment, patient is future-oriented, forward-thinking, and demonstrates ability to act in a self-preserving manner as evidenced by volitionally seeking psychiatric evaluation and treatment today.. Tex Hill contracts for safety and is not an imminent risk of harm to self or others. Outpatient level of care is deemed appropriate at this current time. Patient understands that if they can no longer contract for safety, they need to call the office or report to their nearest Emergency Room for immediate evaluation. At this juncture, inpatient hospitalization is not currently warranted. To mitigate future risk, patient should adhere to treatment recommendations, avoid alcohol/illicit substance use, utilize community-based resources and familiar support, and prioritize mental health treatment.     Medical Decision Making / Counseling / Coordination of Care:  The following interventions are recommended: return in 6 weeks for follow up.  Although patient's acute lethality risk is low, long-term/chronic lethality risk is mildly elevated given the risk factors listed above. However, at the current moment, Tex is future-oriented, forward-thinking, and demonstrates ability to act in a self-preserving manner as evidenced by volitionally seeking psychiatric evaluation and treatment  "today. To mitigate future risk, patient should adhere to treatment recommendations, avoid alcohol/illicit substance use, utilize community-based resources and familiar support, and prioritize mental health treatment. The diagnosis and treatment recommendations were reviewed with the patient. Risks, benefits, and alternatives to treatment were discussed. The importance of medication and treatment compliance was reviewed with the patient. Patient is amenable to calling/contacting the outpatient office including this writer if any acute adverse effects of their medication regimen arise in addition to any comments or concerns pertaining to their psychiatric management. Patient is amenable to calling/contacting crisis and/or attending to the nearest emergency department if their clinical condition deteriorates to assure their safety and stability.    _____________________________________________    History of Present Illness     Chief Complaint: \"I'm okay\"    SUBJECTIVE:    Tex Hill is a 30 y.o. male, possessing a past psychiatric history significant for schizophrenia, who was personally seen and evaluated at the [facility] outpatient clinic  for follow-up regarding medication management.  At their last visit, the plan was to continue current medications.    Tex states that since their previous psychiatric appointment with this writer, he has been stable in terms of his mental health symptoms. He continues to feel tired and struggle with motivation, but has been taking 1-2 walks per day. He denies hallucinations, but has been having some intrusive thoughts of traumatic experiences. He denies thoughts to harm himself or others. He has been sleeping a lot, wakes a lot at night, declines sleep medicine referral. Discussed his weight gain and medication, patient more open to seeing weight management when cholesterol was discussed. He is requesting to see a therapist to discuss " "the anger he has towards his former bullies and about his traumatic experiences. He his adherent with his medication, desires to continue same dosages.     Current Rating Scores:     Current PHQ-9   PHQ-2/9 Depression Screening           Current JESSE-7 is .    Psychiatric Review Of Systems:  Unchanged information from this writer's previous assessment is copied and italicized; information that has changed is bolded.    Appetite: denies  Adverse eating: no  Weight changes: stable weight gain  Insomnia/sleeplessness: reports sleeping from 9PM-10AM, waking a lot   Fatigue/anergy: denies  Anhedonia/lack of interest: has been walking more, playing video games   Attention/concentration: denies   Psychomotor agitation/retardation: no  Somatic symptoms: no  Anxiety/panic attack: denies  Mylene/hypomania: no  Hopelessness/helplessness/worthlessness: no  Self-injurious behavior/high-risk behavior: no  Suicidal ideation: no  Homicidal ideation: no  Auditory hallucinations: denies  Visual hallucinations: no  Other perceptual disturbances: no  Delusional thinking: no  Obsessive/compulsive symptoms: no    Review Of Systems:      Constitutional negative   ENT negative   Cardiovascular negative   Respiratory negative   Gastrointestinal negative   Genitourinary negative   Musculoskeletal negative   Integumentary negative   Neurological negative   Endocrine negative   Other Symptoms none, all other systems are negative     Objective    OBJECTIVE:     Visit Vitals  Smoking Status Never      Wt Readings from Last 6 Encounters:   07/24/24 90.1 kg (198 lb 9.6 oz)        Past Medical History[1]   Past Surgical History[2]      Mental Status Exam:    Appearance age appropriate, casually dressed, inermittent eye contact, appropriate hygiene, seated comfortably   Behavior cooperative, calm   Speech normal rate   Mood \"Good\"   Affect blunted   Thought Processes organized, goal directed   Associations concrete associations   Thought Content " grandiose ideas   Perceptual Disturbances: Denies auditory or visual hallucinations   Abnormal Thoughts  Risk Potential Denies suicidal or homicidal ideation, plan, or intent   Orientation oriented to person, place, time/date, and situation   Memory recent and remote memory grossly intact   Consciousness alert and awake   Attention Span Concentration Span attention span and concentration are age appropriate   Intellect appears to be of average intelligence   Insight fair   Judgement fair   Muscle Strength and  Gait normal muscle strength and normal muscle tone, normal gait and normal balance   Motor Activity no abnormal movements   Language no difficulty naming common objects, no difficulty repeating a phrase, no difficulty writing a sentence   Fund of Knowledge adequate knowledge of current events  adequate fund of knowledge regarding past history  adequate fund of knowledge regarding vocabulary        Meds/Allergies    Allergies[3]  Current Outpatient Medications   Medication Instructions    cholecalciferol (VITAMIN D3) 4,000 Units, Oral, Daily    clozapine (CLOZARIL) 75 mg, Oral, Daily at bedtime    risperiDONE (RISPERDAL) 2 mg, Oral, Daily at bedtime    senna (SENOKOT) 8.6 mg, Oral, Daily at bedtime         Laboratory Results: I have personally reviewed all pertinent laboratory/tests results    Orders Only on 06/02/2025   Component Date Value Ref Range Status    White Blood Cell Count 06/02/2025 6.8  3.4 - 10.8 x10E3/uL Final    Red Blood Cell Count 06/02/2025 5.01  4.14 - 5.80 x10E6/uL Final    Hemoglobin 06/02/2025 14.9  13.0 - 17.7 g/dL Final    HCT 06/02/2025 43.9  37.5 - 51.0 % Final    MCV 06/02/2025 88  79 - 97 fL Final    MCH 06/02/2025 29.7  26.6 - 33.0 pg Final    MCHC 06/02/2025 33.9  31.5 - 35.7 g/dL Final    RDW 06/02/2025 13.2  11.6 - 15.4 % Final    Platelet Count 06/02/2025 282  150 - 450 x10E3/uL Final    Neutrophils 06/02/2025 64  Not Estab. % Final    Lymphocytes 06/02/2025 28  Not Estab. %  Final    Monocytes 06/02/2025 8  Not Estab. % Final    Eosinophils 06/02/2025 0  Not Estab. % Final    Basophils PCT 06/02/2025 0  Not Estab. % Final    Neutrophils (Absolute) 06/02/2025 4.4  1.4 - 7.0 x10E3/uL Final    Lymphocytes (Absolute) 06/02/2025 1.9  0.7 - 3.1 x10E3/uL Final    Monocytes (Absolute) 06/02/2025 0.5  0.1 - 0.9 x10E3/uL Final    Eosinophils (Absolute) 06/02/2025 0.0  0.0 - 0.4 x10E3/uL Final    Basophils ABS 06/02/2025 0.0  0.0 - 0.2 x10E3/uL Final    Immature Granulocytes 06/02/2025 0  Not Estab. % Final    Immature Granulocytes (Absolute) 06/02/2025 0.0  0.0 - 0.1 x10E3/uL Final   Orders Only on 05/05/2025   Component Date Value Ref Range Status    White Blood Cell Count 05/05/2025 7.3  3.4 - 10.8 x10E3/uL Final    Red Blood Cell Count 05/05/2025 5.04  4.14 - 5.80 x10E6/uL Final    Hemoglobin 05/05/2025 14.6  13.0 - 17.7 g/dL Final    HCT 05/05/2025 43.1  37.5 - 51.0 % Final    MCV 05/05/2025 86  79 - 97 fL Final    MCH 05/05/2025 29.0  26.6 - 33.0 pg Final    MCHC 05/05/2025 33.9  31.5 - 35.7 g/dL Final    RDW 05/05/2025 12.9  11.6 - 15.4 % Final    Platelet Count 05/05/2025 286  150 - 450 x10E3/uL Final    Neutrophils 05/05/2025 62  Not Estab. % Final    Lymphocytes 05/05/2025 28  Not Estab. % Final    Monocytes 05/05/2025 7  Not Estab. % Final    Eosinophils 05/05/2025 2  Not Estab. % Final    Basophils PCT 05/05/2025 1  Not Estab. % Final    Neutrophils (Absolute) 05/05/2025 4.4  1.4 - 7.0 x10E3/uL Final    Lymphocytes (Absolute) 05/05/2025 2.1  0.7 - 3.1 x10E3/uL Final    Monocytes (Absolute) 05/05/2025 0.5  0.1 - 0.9 x10E3/uL Final    Eosinophils (Absolute) 05/05/2025 0.2  0.0 - 0.4 x10E3/uL Final    Basophils ABS 05/05/2025 0.1  0.0 - 0.2 x10E3/uL Final    Immature Granulocytes 05/05/2025 0  Not Estab. % Final    Immature Granulocytes (Absolute) 05/05/2025 0.0  0.0 - 0.1 x10E3/uL Final             ___________________________________    History Review: The following portions of the  "patient's history were reviewed and updated as appropriate: allergies, current medications, past family history, past medical history, past social history, past surgical history, and problem list.    Unchanged information from this writer's previous assessment is copied and italicized; information that has changed is bolded.    Past Psychiatric History:      Past psychiatric diagnoses:   Schizophrenia, schizoaffective d/o, anxiety, depression   Inpatient psychiatric admissions:   10 times total (4 were involuntary)  First - age 22 after stealing sisters klonopin  Most common reason: first few were \"no reason\"  2017 - got invega shot was a very influential moment  2 suicide attempts/SI: SI and wanting to jump off a roof (2021) a few months later after sleeping pills, tried to hang himself 2 years ago  Psychosis 3 times (most recent for that reason was 3 years)  Most recent was 2 years ago  Has stayed out of the hospital due to \"taking his medications, not having suicidal ideation\"  Denies history of ECT, denies having had ICM in past.   Prior outpatient psychiatric treatment:   Saw Chilo Ramsey for 3 years  Saw psychiatrist at [facility] for a year before that  Past/current psychotherapy:   Worked with a therapist at [facility] Masood, stopped seeing 1-2 years ago because he left the practice  History of suicidal attempts/gestures:   2: took sleeping pills (August 2021), tried to hang self two years  History of non-suicidal self-injurious behavior:   None  History of violence/aggressive behaviors:   None  Psychotropic medication trials:   Invega, (discomfort) risperdal, zyprexa, thorazine (all unknown reasons for discontinuation)  Abilify (bad reaction)  Invega SCHAEFER x1 in 2019 (\"hurt his head\")  Ativan ('caused psychosis')  Loxapine - feeling too tired on dosages above 20mg  Risperdal - above 2mg causes nausea and \"hurting\" in his head  Clozapine - current medication  Melatonin - current medication  Substance abuse " "inpatient/outpatient rehabilitation:   Rehab in 2017 - marijuana use  Eating disorder history:   No  Other services: used to have a  but reports he does not anymore because they ran out of things to do together      Substance Abuse History:     States he was smoking a lot of marijuana for 5 years, nearly every day, stopped smoking 2019 July after he \"freaked out\" and thought \"there were people in his basement and living in his house and torturing him. \"     Denies any other recreational drug use and otherwise denies history of alcohol, illict substance, or tobacco abuse., Patient denies previous legal actions or arrests related to substance intoxication including prior DWIs/DUIs., Patient does not exhibit objective evidence of substance withdrawal during today's examination nor do they appear under the influence of any psychoactive substance.       Family Psychiatric History:      Family History   History reviewed. No pertinent family history.         Psychiatric Family History: Per patient he is adopted,  Per mother is not adopted. Maternal grandfather - schizophrenia  Tex's half sister has bipolar disorder     Social History:     Developmental: Was in special education, denies IEP  Per care everywhere, mild developmental delay, early intervention at age 2.5. Concern for autism, diagnosed with ADHD.  Has 2 sister (50 and 34 years old), states he was adopted but his mother reports he is not adopted  Education: high school diploma/GED  Marital history: single  Children: none  Living arrangement, social support: Family is supportive, mom is supportive. Lives with mother, on a waiting list for housing, on disability for schizoaffective disorder.   Dad lives in [place]   Occupational History: Permanent disability. 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 , Patient denies history of arrests or violence with a deadly weapon.    history: None     Traumatic History:      Abuse: Denies  Other Traumatic Events: Bullying, and would not disclose further.  ___________________________________      Visit Time    Visit Start Time: 1:00  Visit Stop Time: 1:20  Total Visit Duration: 20 minutes    Lauren Stockton MD   06/04/25         [1] No past medical history on file.  [2] No past surgical history on file.  [3]   Allergies  Allergen Reactions    Gantrisin [Sulfisoxazole] Hallucinations     Took as a child and had hallucinations per mother

## 2025-06-04 NOTE — BH CRISIS PLAN
Client Name: Tex Hill       Client YOB: 1995    Rod[employer] Safety Plan      Creation Date: 7/18/24 Update Date: 7/18/25   Created By: SAQIB Valladares Last Updated By: SAQIB Valladares      Step 1: Warning Signs:   Warning Signs   discomfort   feeling suicidal            Step 2: Internal Coping Strategies:   Internal Coping Strategies   sleep   journal   say something mean to get it out not at someone   pace            Step 3: People and social settings that provide distraction:   Name Contact Information   mother [phone]    Places   my house           Step 4: People whom I can ask for help during a crisis:      Name Contact Information    mother in cell      Step 5: Professionals or agencies I can contact during a crisis:      Clinican/Agency Name Phone Emergency Contact    [facility]-MARIA DEL CARMEN Pinedo [phone]      Local Emergency Department Emergency Department Phone Emergency Department [place]    [address] [place]        Crisis Phone Numbers:   Suicide Prevention Lifeline: Call or Text  828 Crisis Text Line: Text HOME to 278-731   Please note: Some [facility] do not have a separate number for Child/Adolescent specific crisis. If your county is not listed under Child/Adolescent, please call the adult number for your county      Adult Crisis Numbers: Child/Adolescent Crisis Numbers   [facility]: [phone] [facility]: [phone]   [facility]: [phone] [facility]: [phone]   [facility]: [phone] [place]: [phone]   [facility]: [phone] [place]: [phone]   [place] [facility]: [phone]   [facility]: [phone]   [facility]: [phone]   [facility]: [phone] (daytime) [phone] (after hours, weekends, holidays)      Step 6: Making the environment safer (plan for lethal means safety):   Patient did  not identify any lethal methods: Yes     Optional: What is most important to me and worth living for?      Bhanu Safety Plan. Sandie Velasco and Evaristo Hand. Used with permission of the authors.

## 2025-06-04 NOTE — ASSESSMENT & PLAN NOTE
Orders:    Ambulatory Referral to Weight Management; Future     
    Orders:    cholecalciferol (VITAMIN D3) 1,000 units tablet; Take 4 tablets (4,000 Units total) by mouth daily     
    Orders:    senna (SENOKOT) 8.6 mg; Take 1 tablet (8.6 mg total) by mouth daily at bedtime     
Referral to weight management clinic  Orders:    Ambulatory Referral to Weight Management; Future    
Symptoms at goal. Pt declines to consolidate regmen to single antipsychotic therapy, and prefers to be on both risperdal and clozapine. He has had significant improvement on this regimen. Continue monthly CBCs.     Orders:    clozapine (CLOZARIL) 50 MG tablet; Take 1.5 tablets (75 mg total) by mouth daily at bedtime    risperiDONE (RisperDAL) 2 mg tablet; Take 1 tablet (2 mg total) by mouth daily at bedtime     
Yes

## 2025-06-09 ENCOUNTER — TELEPHONE (OUTPATIENT)
Dept: PSYCHIATRY | Facility: CLINIC | Age: 30
End: 2025-06-09

## 2025-06-09 NOTE — TELEPHONE ENCOUNTER
Left voicemail informing patient and/or parent/guardian of the Psych Encounter form needing to be signed as a requirement from the insurance company for billing purposes. Patient can access form via [employer] and sign electronically.     Please make patient aware this form must be signed for each visit as a requirement to continue future visits with provider.

## 2025-07-08 LAB
CHOLEST SERPL-MCNC: 173 MG/DL (ref 100–199)
EST. AVERAGE GLUCOSE BLD GHB EST-MCNC: 105 MG/DL
HBA1C MFR BLD: 5.3 % (ref 4.8–5.6)
HDLC SERPL-MCNC: 25 MG/DL
LDLC SERPL CALC-MCNC: 102 MG/DL (ref 0–99)
LDLC/HDLC SERPL: 4.1 RATIO (ref 0–3.6)
SL AMB VLDL CHOLESTEROL CALC: 46 MG/DL (ref 5–40)
TRIGL SERPL-MCNC: 270 MG/DL (ref 0–149)

## 2025-07-10 ENCOUNTER — TELEPHONE (OUTPATIENT)
Age: 30
End: 2025-07-10

## 2025-07-10 DIAGNOSIS — Z79.899 LONG TERM CURRENT USE OF CLOZAPINE: Primary | ICD-10-CM

## 2025-07-10 NOTE — TELEPHONE ENCOUNTER
Marcellus Solorio,  If you can place the recurring order for blood work, we can fax the lab requisition.  Thank you

## 2025-07-10 NOTE — TELEPHONE ENCOUNTER
Patient's mother called in to inquire about blood work order that needs to be sent over to [facility].     Patient's mother stated that patient recently underwent a ABDELRAHMAN to Dr. Solorio. Patient's mother stated that previously, Dr. Stockton would fax over blood work orders in advance for 12 scripts. Patient's mother stated that patient is supposed to get blood work done monthly for clozapine.     Patient's mother confirmed [facility] information as the following:    Address: [address] [place] [address]  Telephone: [phone]  Fax number: [phone]    Patient's mother stated that the blood work needs to get done today or tomorrow. Patient's mother is requesting a call back to confirm that the orders were put through. Patient's mother's phone number is [phone].

## 2025-07-10 NOTE — TELEPHONE ENCOUNTER
Lab orders faxed to [facility] at [phone]. Confirmation received.    KINDRA on Nela's VM informing her that lab orders were faxed.  Requested she call the  office if she has any questions.

## 2025-07-14 LAB
BASOPHILS # BLD AUTO: 0 X10E3/UL (ref 0–0.2)
BASOPHILS NFR BLD AUTO: 1 %
EOSINOPHIL # BLD AUTO: 0 X10E3/UL (ref 0–0.4)
EOSINOPHIL NFR BLD AUTO: 0 %
ERYTHROCYTE [DISTWIDTH] IN BLOOD BY AUTOMATED COUNT: 13.1 % (ref 11.6–15.4)
HCT VFR BLD AUTO: 41.8 % (ref 37.5–51)
HGB BLD-MCNC: 14.3 G/DL (ref 13–17.7)
IMM GRANULOCYTES # BLD: 0 X10E3/UL (ref 0–0.1)
IMM GRANULOCYTES NFR BLD: 0 %
LYMPHOCYTES # BLD AUTO: 2 X10E3/UL (ref 0.7–3.1)
LYMPHOCYTES NFR BLD AUTO: 27 %
MCH RBC QN AUTO: 29.4 PG (ref 26.6–33)
MCHC RBC AUTO-ENTMCNC: 34.2 G/DL (ref 31.5–35.7)
MCV RBC AUTO: 86 FL (ref 79–97)
MONOCYTES # BLD AUTO: 0.5 X10E3/UL (ref 0.1–0.9)
MONOCYTES NFR BLD AUTO: 6 %
NEUTROPHILS # BLD AUTO: 4.8 X10E3/UL (ref 1.4–7)
NEUTROPHILS NFR BLD AUTO: 66 %
PLATELET # BLD AUTO: 264 X10E3/UL (ref 150–450)
RBC # BLD AUTO: 4.87 X10E6/UL (ref 4.14–5.8)
WBC # BLD AUTO: 7.3 X10E3/UL (ref 3.4–10.8)

## 2025-07-30 ENCOUNTER — TELEPHONE (OUTPATIENT)
Age: 30
End: 2025-07-30

## 2025-07-30 ENCOUNTER — OFFICE VISIT (OUTPATIENT)
Dept: PSYCHIATRY | Facility: CLINIC | Age: 30
End: 2025-07-30
Payer: COMMERCIAL

## 2025-07-30 DIAGNOSIS — Z79.899 LONG TERM CURRENT USE OF CLOZAPINE: ICD-10-CM

## 2025-07-30 DIAGNOSIS — F20.9 SCHIZOPHRENIA, UNSPECIFIED TYPE (HCC): Primary | ICD-10-CM

## 2025-07-30 DIAGNOSIS — E55.9 VITAMIN D DEFICIENCY: ICD-10-CM

## 2025-07-30 DIAGNOSIS — K59.03 DRUG-INDUCED CONSTIPATION: ICD-10-CM

## 2025-07-30 PROCEDURE — 90792 PSYCH DIAG EVAL W/MED SRVCS: CPT | Performed by: PSYCHIATRY & NEUROLOGY

## 2025-07-30 RX ORDER — RISPERIDONE 2 MG/1
2 TABLET ORAL
Qty: 30 TABLET | Refills: 2 | Status: SHIPPED | OUTPATIENT
Start: 2025-07-30 | End: 2025-10-28

## 2025-07-30 RX ORDER — SENNOSIDES 8.6 MG
8.6 TABLET ORAL
Qty: 30 TABLET | Refills: 2 | Status: SHIPPED | OUTPATIENT
Start: 2025-07-30 | End: 2025-10-28

## 2025-07-30 RX ORDER — CLOZAPINE 50 MG/1
75 TABLET ORAL
Qty: 45 TABLET | Refills: 2 | Status: SHIPPED | OUTPATIENT
Start: 2025-07-30 | End: 2025-10-28

## 2025-07-30 RX ORDER — CHOLECALCIFEROL (VITAMIN D3) 25 MCG
4000 TABLET ORAL DAILY
Qty: 120 TABLET | Refills: 2 | Status: SHIPPED | OUTPATIENT
Start: 2025-07-30 | End: 2025-10-28